# Patient Record
Sex: FEMALE | Race: WHITE | HISPANIC OR LATINO | Employment: OTHER | URBAN - METROPOLITAN AREA
[De-identification: names, ages, dates, MRNs, and addresses within clinical notes are randomized per-mention and may not be internally consistent; named-entity substitution may affect disease eponyms.]

---

## 2017-01-25 ENCOUNTER — TRANSCRIBE ORDERS (OUTPATIENT)
Dept: LAB | Facility: CLINIC | Age: 72
End: 2017-01-25

## 2017-01-25 ENCOUNTER — APPOINTMENT (OUTPATIENT)
Dept: LAB | Facility: CLINIC | Age: 72
End: 2017-01-25
Payer: COMMERCIAL

## 2017-01-25 DIAGNOSIS — I10 ESSENTIAL (PRIMARY) HYPERTENSION: ICD-10-CM

## 2017-01-25 DIAGNOSIS — F32.9 MAJOR DEPRESSIVE DISORDER, SINGLE EPISODE: ICD-10-CM

## 2017-01-25 LAB
ALBUMIN SERPL BCP-MCNC: 3.5 G/DL (ref 3.5–5)
ALP SERPL-CCNC: 72 U/L (ref 46–116)
ALT SERPL W P-5'-P-CCNC: 18 U/L (ref 12–78)
ANION GAP SERPL CALCULATED.3IONS-SCNC: 5 MMOL/L (ref 4–13)
AST SERPL W P-5'-P-CCNC: 24 U/L (ref 5–45)
BILIRUB SERPL-MCNC: 0.34 MG/DL (ref 0.2–1)
BUN SERPL-MCNC: 11 MG/DL (ref 5–25)
CALCIUM SERPL-MCNC: 8.6 MG/DL (ref 8.3–10.1)
CHLORIDE SERPL-SCNC: 106 MMOL/L (ref 100–108)
CHOLEST SERPL-MCNC: 148 MG/DL (ref 50–200)
CO2 SERPL-SCNC: 30 MMOL/L (ref 21–32)
CREAT SERPL-MCNC: 0.69 MG/DL (ref 0.6–1.3)
ERYTHROCYTE [DISTWIDTH] IN BLOOD BY AUTOMATED COUNT: 12.9 % (ref 11.6–15.1)
GFR SERPL CREATININE-BSD FRML MDRD: >60 ML/MIN/1.73SQ M
GLUCOSE SERPL-MCNC: 100 MG/DL (ref 65–140)
HCT VFR BLD AUTO: 41.5 % (ref 34.8–46.1)
HDLC SERPL-MCNC: 46 MG/DL (ref 40–60)
HGB BLD-MCNC: 13.6 G/DL (ref 11.5–15.4)
LDLC SERPL CALC-MCNC: 85 MG/DL (ref 0–100)
MCH RBC QN AUTO: 32.4 PG (ref 26.8–34.3)
MCHC RBC AUTO-ENTMCNC: 32.8 G/DL (ref 31.4–37.4)
MCV RBC AUTO: 99 FL (ref 82–98)
PLATELET # BLD AUTO: 209 THOUSANDS/UL (ref 149–390)
PMV BLD AUTO: 11.3 FL (ref 8.9–12.7)
POTASSIUM SERPL-SCNC: 4.3 MMOL/L (ref 3.5–5.3)
PROT SERPL-MCNC: 7.3 G/DL (ref 6.4–8.2)
RBC # BLD AUTO: 4.2 MILLION/UL (ref 3.81–5.12)
SODIUM SERPL-SCNC: 141 MMOL/L (ref 136–145)
TRIGL SERPL-MCNC: 87 MG/DL
TSH SERPL DL<=0.05 MIU/L-ACNC: 1.66 UIU/ML (ref 0.36–3.74)
WBC # BLD AUTO: 5.62 THOUSAND/UL (ref 4.31–10.16)

## 2017-01-25 PROCEDURE — 84443 ASSAY THYROID STIM HORMONE: CPT

## 2017-01-25 PROCEDURE — 80061 LIPID PANEL: CPT

## 2017-01-25 PROCEDURE — 80053 COMPREHEN METABOLIC PANEL: CPT

## 2017-01-25 PROCEDURE — 36415 COLL VENOUS BLD VENIPUNCTURE: CPT

## 2017-01-25 PROCEDURE — 85027 COMPLETE CBC AUTOMATED: CPT

## 2017-05-24 ENCOUNTER — ALLSCRIPTS OFFICE VISIT (OUTPATIENT)
Dept: OTHER | Facility: OTHER | Age: 72
End: 2017-05-24

## 2017-07-24 DIAGNOSIS — I10 ESSENTIAL (PRIMARY) HYPERTENSION: ICD-10-CM

## 2017-08-18 ENCOUNTER — GENERIC CONVERSION - ENCOUNTER (OUTPATIENT)
Dept: OTHER | Facility: OTHER | Age: 72
End: 2017-08-18

## 2017-11-17 ENCOUNTER — ALLSCRIPTS OFFICE VISIT (OUTPATIENT)
Dept: OTHER | Facility: OTHER | Age: 72
End: 2017-11-17

## 2017-11-17 DIAGNOSIS — I10 ESSENTIAL (PRIMARY) HYPERTENSION: ICD-10-CM

## 2017-11-19 NOTE — PROGRESS NOTES
Assessment    1  Benign essential hypertension (401 1) (I10)   2  Chronic obstructive pulmonary disease (496) (J44 9)   3  Osteoporosis (733 00) (M81 0)   4  Cigarette nicotine dependence without complication (200 3) (T97 054)   5  Urinary incontinence (788 30) (R32)    Plan  Benign essential hypertension    · (1) CBC/PLT/DIFF; Status:Active; Requested for:17May2018;    · (1) CBC/PLT/DIFF; Status:Active; Requested for:17Nov2017;    · (1) COMPREHENSIVE METABOLIC PANEL; Status:Active; Requested for:17May2018;    · (1) COMPREHENSIVE METABOLIC PANEL; Status:Active; Requested for:17Nov2017;    · (1) LIPID PANEL, FASTING; Status:Active; Requested for:17May2018;    · (1) LIPID PANEL, FASTING; Status:Active; Requested for:17Nov2017;    · (1) TSH; Status:Active; Requested for:17May2018;    · (1) TSH; Status:Active; Requested for:17Nov2017;   Flu vaccine need    · Fluzone High-Dose 0 5 ML Intramuscular Suspension Prefilled Syringe  Need for pneumococcal vaccination    · Prevnar 13 Intramuscular Suspension  Screening for genitourinary condition    · The plan of care for urinary incontinence is detailed in the plan and/or discussion section of today'snote ; Status:Complete;   Done: 76SHH5638    Discussion/Summary  Discussion Summary:   COPD- she takes Symbicort inhaler daily   She continues to take the albuterol inhaler as needed  feels better, is off medicationblood pressure stable on present medication, continue the same  incontinenceâ stable, not on any medication  Was told to do daily exercises and bladder training  continue alendronate, calcium and vitamin D useâ I counseled her to quit smoking  Counseling Documentation With Imm: The patient was counseled regarding instructions for management,-- risk factor reductions,-- risks and benefits of treatment options,-- importance of compliance with treatment     Medication SE Review and Pt Understands Tx: Possible side effects of new medications were reviewed with the patient/guardian today  The treatment plan was reviewed with the patient/guardian  The patient/guardian understands and agrees with the treatment plan      Chief Complaint  Chief Complaint Chronic Condition [facility]: Patient is here today for follow up of chronic conditions described in HPI  History of Present Illness  Incontinence, Urinary (Follow-Up): The patient is being seen for follow-up of urinary incontinence  The patient reports no change in the condition  She has had no significant interval events  The patient is currently asymptomatic  The patient is not currently on medication for this problem  COPD (Follow-Up): The patient states she has been stable with her COPD control since the last visit  She has no comorbid illnesses  She has no significant interval events  Symptoms: The patient is currently asymptomatic  Medications: the patient is not adherent with her medication regimen  -- She denies medication side effects  Osteoporosis (Follow-Up): The patient's osteoporosis has been stable since the last visit  She has no complications  She has no significant interval events  Symptoms:  Medications: the patient is adherent with her medication regimen  -- She denies medication side effects  Hypertension (Follow-Up): The patient presents for follow-up of essential hypertension  The patient states she has been stable with her blood pressure control since the last visit  She has no comorbid illnesses  She has no significant interval events  Symptoms: The patient is currently asymptomatic  Medications: the patient is adherent with her medication regimen  -- She denies medication side effects  Review of Systems  Complete-Female:  Constitutional: No fever, no chills, feels well, no tiredness, no recent weight gain or weight loss  Eyes: No complaints of eye pain, no red eyes, no eyesight problems, no discharge, no dry eyes, no itching of eyes    ENT: no complaints of earache, no loss of hearing, no nose bleeds, no nasal discharge, no sore throat, no hoarseness  Cardiovascular: No complaints of slow heart rate, no fast heart rate, no chest pain, no palpitations, no leg claudication, no lower extremity edema  Respiratory: No complaints of shortness of breath, no wheezing, no cough, no SOB on exertion, no orthopnea, no PND  Gastrointestinal: No complaints of abdominal pain, no constipation, no nausea or vomiting, no diarrhea, no bloody stools  Genitourinary: No complaints of dysuria, no incontinence, no pelvic pain, no dysmenorrhea, no vaginal discharge or bleeding  Musculoskeletal: No complaints of arthralgias, no myalgias, no joint swelling or stiffness, no limb pain or swelling  Integumentary: No complaints of skin rash or lesions, no itching, no skin wounds, no breast pain or lump  Neurological: No complaints of headache, no confusion, no convulsions, no numbness, no dizziness or fainting, no tingling, no limb weakness, no difficulty walking  Psychiatric: Not suicidal, no sleep disturbance, no anxiety or depression, no change in personality, no emotional problems  Endocrine: No complaints of proptosis, no hot flashes, no muscle weakness, no deepening of the voice, no feelings of weakness  Hematologic/Lymphatic: No complaints of swollen glands, no swollen glands in the neck, does not bleed easily, does not bruise easily  Active Problems  1  Abnormal mammogram (793 80) (R92 8)   2  Benign essential hypertension (401 1) (I10)   3  Chronic obstructive pulmonary disease (496) (J44 9)   4  Cigarette nicotine dependence without complication (649 9) (T02 198)   5  Depression (311) (F32 9)   6  Emphysema lung (492 8) (J43 9)   7  Flu vaccine need (V04 81) (Z23)   8  Osteoporosis (733 00) (M81 0)   9  Screening for diabetes mellitus (DM) (V77 1) (Z13 1)   10  Screening for genitourinary condition (V81 6) (Z13 89)   11  Tobacco use (305 1) (Z72 0)   12   Urinary incontinence (788 30) (R32)    Past Medical History  1  History of Ankle Sprain (845 00)   2  History of Ankle Strain (845 00)   3  History of Cough (786 2) (R05)   4  History of Encounter for screening for malignant neoplasm of colon (V76 51) (Z12 11)   5  History of Encounter for screening mammogram for breast cancer (V76 12) (Z12 31)   6  History of Fibromyalgia (729 1) (M79 7)   7  History of acute bronchitis (V12 69) (Z87 09)   8  History of chronic bronchitis (V12 69) (Z87 09)   9  History of hypertension (V12 59) (Z86 79)   10  History of shortness of breath (V13 89) (Z87 898)   11  History of Joint pain (719 40) (M25 50)   12  History of Myalgia And Myositis (729 1)   13  History of Need for influenza vaccination (V04 81) (Z23)   14  History of Osteoporosis screening (V82 81) (Z13 820)   15  History of Pain, joint, shoulder (719 41) (M25 519)   16  History of Sciatica of right side (724 3) (M54 31)   17  History of Screening for lipoid disorders (V77 91) (Z13 220)   18  History of Screening for thyroid disorder (V77 0) (Z13 29)  Active Problems And Past Medical History Reviewed: The active problems and past medical history were reviewed and updated today  Surgical History  1  History of Complete Colonoscopy  Surgical History Reviewed: The surgical history was reviewed and updated today  Family History  Mother    1  Family history of Denial Of Any Significant Medical History  Father    2  Family history of Denial Of Any Significant Medical History  Family History Reviewed: The family history was reviewed and updated today  Social History     · Alcohol Use (History)   · Cigar smoker motivated to quit (305 1) (F17 290)   · Denied: History of Drug Use   · Marital History - Currently    · Occupation:   · Tobacco use (305 1) (Z72 0)  Social History Reviewed: The social history was reviewed and updated today  The social history was reviewed and is unchanged  Current Meds   1   Alendronate Sodium 70 MG Oral Tablet; TAKE 1 TABLET BY MOUTH ONCE WEEKLY; Therapy: 85PRH9694 to (Last Rx:17Oct2017)  Requested for: 62Jwg2843 Ordered   2  Amlodipine Besy-Benazepril HCl - 5-20 MG Oral Capsule; take 1 capsule daily; Therapy: 81ONS9958 to (Renew:60Dlb6482)  Requested for: 55ZAH7923; Last Rx:93Hvi0497 Ordered   3  Proventil  (90 Base) MCG/ACT Inhalation Aerosol Solution; INHAL 2 QD PRN; Therapy: (BAFOELPY:57TLK2307) to Recorded   4  Symbicort 160-4 5 MCG/ACT Inhalation Aerosol; INHALE 2 PUFFS TWICE DAILY  RINSE MOUTH AFTER USE; Therapy: 33Uog3334 to (Last YB:66KBU9415)  Requested for: 02KCP2404 Ordered  Medication List Reviewed: The medication list was reviewed and updated today  Allergies  1  No Known Drug Allergies    Vitals  Vital Signs    Recorded: 17Nov2017 01:08PM   Heart Rate 98   Systolic 472   Diastolic 72   Height 4 ft 11 in   Weight 130 lb 6 oz   BMI Calculated 26 33   BSA Calculated 1 54   O2 Saturation 96       Physical Exam   Constitutional  General appearance: No acute distress, well appearing and well nourished  Head and Face  Head and face: Normal    Palpation of the face and sinuses: No sinus tenderness  Eyes  Conjunctiva and lids: No swelling, erythema or discharge  Pupils and irises: Equal, round, reactive to light  Ears, Nose, Mouth, and Throat  External inspection of ears and nose: Normal    Otoscopic examination: Tympanic membranes translucent with normal light reflex  Canals patent without erythema  Oropharynx: Normal with no erythema, edema, exudate or lesions  Neck  Neck: Supple, symmetric, trachea midline, no masses  Pulmonary  Respiratory effort: No increased work of breathing or signs of respiratory distress  Auscultation of lungs: Abnormal   Auscultation of the lungs revealed expiratory wheezing  Cardiovascular  Palpation of heart: Normal PMI, no thrills  Auscultation of heart: Normal rate and rhythm, normal S1 and S2, no murmurs     Examination of extremities for edema and/or varicosities: Normal    Abdomen  Abdomen: Non-tender, no masses  Lymphatic  Palpation of lymph nodes in neck: No lymphadenopathy  Musculoskeletal  Gait and station: Normal    Skin  Skin and subcutaneous tissue: Normal without rashes or lesions  Neurologic  Cranial nerves: Cranial nerves II-XII intact  Psychiatric  Judgment and insight: Normal    Mood and affect: Normal        Health Management  Health Maintenance   COLONOSCOPY; every 3 years; Last 24GNH8987; Next Due: 32BUF3375; Overdue  Influenza (Split); every 1 year; Last 56XVY1487; Next Due: 79ICN8576; Overdue  Medicare Annual Wellness Visit; every 1 year; Next Due: 12Xgy2843; Active  Pneumo; every 5 years; Last 27RFG8906; Next Due: [id number]; Overdue    Future Appointments    Date/Time Provider Specialty Site   05/25/2018 10:45 AM CORRINA Maxwell   Internal Medicine [facility]       Signatures   Electronically signed by : CORRINA Bowen ; Nov 17 2017  6:21PM EST                       (Author)

## 2018-01-13 VITALS
HEIGHT: 59 IN | HEART RATE: 98 BPM | SYSTOLIC BLOOD PRESSURE: 130 MMHG | OXYGEN SATURATION: 96 % | DIASTOLIC BLOOD PRESSURE: 72 MMHG | WEIGHT: 130.38 LBS | BODY MASS INDEX: 26.28 KG/M2

## 2018-01-14 VITALS
SYSTOLIC BLOOD PRESSURE: 142 MMHG | OXYGEN SATURATION: 97 % | DIASTOLIC BLOOD PRESSURE: 70 MMHG | WEIGHT: 128.5 LBS | HEIGHT: 59 IN | HEART RATE: 88 BPM | BODY MASS INDEX: 25.91 KG/M2

## 2018-05-17 DIAGNOSIS — I10 ESSENTIAL (PRIMARY) HYPERTENSION: ICD-10-CM

## 2018-05-18 ENCOUNTER — APPOINTMENT (OUTPATIENT)
Dept: LAB | Facility: CLINIC | Age: 73
End: 2018-05-18
Payer: COMMERCIAL

## 2018-05-18 DIAGNOSIS — I10 ESSENTIAL (PRIMARY) HYPERTENSION: ICD-10-CM

## 2018-05-18 LAB
ALBUMIN SERPL BCP-MCNC: 3.6 G/DL (ref 3.5–5)
ALP SERPL-CCNC: 91 U/L (ref 46–116)
ALT SERPL W P-5'-P-CCNC: 18 U/L (ref 12–78)
ANION GAP SERPL CALCULATED.3IONS-SCNC: 5 MMOL/L (ref 4–13)
AST SERPL W P-5'-P-CCNC: 26 U/L (ref 5–45)
BASOPHILS # BLD AUTO: 0.04 THOUSANDS/ΜL (ref 0–0.1)
BASOPHILS NFR BLD AUTO: 1 % (ref 0–1)
BILIRUB SERPL-MCNC: 0.5 MG/DL (ref 0.2–1)
BUN SERPL-MCNC: 6 MG/DL (ref 5–25)
CALCIUM SERPL-MCNC: 8.6 MG/DL (ref 8.3–10.1)
CHLORIDE SERPL-SCNC: 107 MMOL/L (ref 100–108)
CHOLEST SERPL-MCNC: 141 MG/DL (ref 50–200)
CO2 SERPL-SCNC: 27 MMOL/L (ref 21–32)
CREAT SERPL-MCNC: 0.74 MG/DL (ref 0.6–1.3)
EOSINOPHIL # BLD AUTO: 0.06 THOUSAND/ΜL (ref 0–0.61)
EOSINOPHIL NFR BLD AUTO: 1 % (ref 0–6)
ERYTHROCYTE [DISTWIDTH] IN BLOOD BY AUTOMATED COUNT: 13.1 % (ref 11.6–15.1)
GFR SERPL CREATININE-BSD FRML MDRD: 81 ML/MIN/1.73SQ M
GLUCOSE P FAST SERPL-MCNC: 100 MG/DL (ref 65–99)
HCT VFR BLD AUTO: 44.3 % (ref 34.8–46.1)
HDLC SERPL-MCNC: 42 MG/DL (ref 40–60)
HGB BLD-MCNC: 13.8 G/DL (ref 11.5–15.4)
LDLC SERPL CALC-MCNC: 85 MG/DL (ref 0–100)
LYMPHOCYTES # BLD AUTO: 1.54 THOUSANDS/ΜL (ref 0.6–4.47)
LYMPHOCYTES NFR BLD AUTO: 27 % (ref 14–44)
MCH RBC QN AUTO: 31.9 PG (ref 26.8–34.3)
MCHC RBC AUTO-ENTMCNC: 31.2 G/DL (ref 31.4–37.4)
MCV RBC AUTO: 103 FL (ref 82–98)
MONOCYTES # BLD AUTO: 0.28 THOUSAND/ΜL (ref 0.17–1.22)
MONOCYTES NFR BLD AUTO: 5 % (ref 4–12)
NEUTROPHILS # BLD AUTO: 3.82 THOUSANDS/ΜL (ref 1.85–7.62)
NEUTS SEG NFR BLD AUTO: 67 % (ref 43–75)
NONHDLC SERPL-MCNC: 99 MG/DL
NRBC BLD AUTO-RTO: 0 /100 WBCS
PLATELET # BLD AUTO: 201 THOUSANDS/UL (ref 149–390)
PMV BLD AUTO: 11.6 FL (ref 8.9–12.7)
POTASSIUM SERPL-SCNC: 4 MMOL/L (ref 3.5–5.3)
PROT SERPL-MCNC: 7.5 G/DL (ref 6.4–8.2)
RBC # BLD AUTO: 4.32 MILLION/UL (ref 3.81–5.12)
SODIUM SERPL-SCNC: 139 MMOL/L (ref 136–145)
TRIGL SERPL-MCNC: 69 MG/DL
TSH SERPL DL<=0.05 MIU/L-ACNC: 1.58 UIU/ML (ref 0.36–3.74)
WBC # BLD AUTO: 5.74 THOUSAND/UL (ref 4.31–10.16)

## 2018-05-18 PROCEDURE — 85025 COMPLETE CBC W/AUTO DIFF WBC: CPT

## 2018-05-18 PROCEDURE — 36415 COLL VENOUS BLD VENIPUNCTURE: CPT

## 2018-05-18 PROCEDURE — 84443 ASSAY THYROID STIM HORMONE: CPT

## 2018-05-18 PROCEDURE — 80053 COMPREHEN METABOLIC PANEL: CPT

## 2018-05-18 PROCEDURE — 80061 LIPID PANEL: CPT

## 2018-05-23 RX ORDER — ALBUTEROL SULFATE 90 UG/1
AEROSOL, METERED RESPIRATORY (INHALATION) EVERY 4 HOURS PRN
COMMUNITY
End: 2019-12-11 | Stop reason: SDUPTHER

## 2018-05-23 RX ORDER — ALENDRONATE SODIUM 70 MG/1
1 TABLET ORAL WEEKLY
COMMUNITY
Start: 2016-01-06 | End: 2018-11-18 | Stop reason: SDUPTHER

## 2018-05-23 RX ORDER — BUDESONIDE AND FORMOTEROL FUMARATE DIHYDRATE 160; 4.5 UG/1; UG/1
2 AEROSOL RESPIRATORY (INHALATION) 2 TIMES DAILY
COMMUNITY
Start: 2016-12-20 | End: 2019-01-29 | Stop reason: SDUPTHER

## 2018-05-23 RX ORDER — AMLODIPINE BESYLATE AND BENAZEPRIL HYDROCHLORIDE 5; 20 MG/1; MG/1
1 CAPSULE ORAL DAILY
COMMUNITY
Start: 2014-06-19 | End: 2018-06-09 | Stop reason: SDUPTHER

## 2018-05-25 ENCOUNTER — OFFICE VISIT (OUTPATIENT)
Dept: INTERNAL MEDICINE CLINIC | Facility: CLINIC | Age: 73
End: 2018-05-25
Payer: COMMERCIAL

## 2018-05-25 VITALS
BODY MASS INDEX: 26.21 KG/M2 | HEIGHT: 59 IN | DIASTOLIC BLOOD PRESSURE: 72 MMHG | WEIGHT: 130 LBS | HEART RATE: 99 BPM | OXYGEN SATURATION: 96 % | SYSTOLIC BLOOD PRESSURE: 136 MMHG

## 2018-05-25 DIAGNOSIS — F17.210 CIGARETTE NICOTINE DEPENDENCE WITHOUT COMPLICATION: ICD-10-CM

## 2018-05-25 DIAGNOSIS — N39.3 STRESS INCONTINENCE OF URINE: ICD-10-CM

## 2018-05-25 DIAGNOSIS — Z00.00 MEDICARE ANNUAL WELLNESS VISIT, SUBSEQUENT: Primary | ICD-10-CM

## 2018-05-25 DIAGNOSIS — M81.0 AGE-RELATED OSTEOPOROSIS WITHOUT CURRENT PATHOLOGICAL FRACTURE: ICD-10-CM

## 2018-05-25 DIAGNOSIS — I10 BENIGN ESSENTIAL HYPERTENSION: ICD-10-CM

## 2018-05-25 DIAGNOSIS — J44.9 CHRONIC OBSTRUCTIVE PULMONARY DISEASE, UNSPECIFIED COPD TYPE (HCC): ICD-10-CM

## 2018-05-25 PROCEDURE — 3075F SYST BP GE 130 - 139MM HG: CPT | Performed by: INTERNAL MEDICINE

## 2018-05-25 PROCEDURE — 3008F BODY MASS INDEX DOCD: CPT | Performed by: INTERNAL MEDICINE

## 2018-05-25 PROCEDURE — 3078F DIAST BP <80 MM HG: CPT | Performed by: INTERNAL MEDICINE

## 2018-05-25 PROCEDURE — G0439 PPPS, SUBSEQ VISIT: HCPCS | Performed by: INTERNAL MEDICINE

## 2018-05-25 PROCEDURE — 99214 OFFICE O/P EST MOD 30 MIN: CPT | Performed by: INTERNAL MEDICINE

## 2018-05-25 PROCEDURE — 4004F PT TOBACCO SCREEN RCVD TLK: CPT | Performed by: INTERNAL MEDICINE

## 2018-05-25 PROCEDURE — 3725F SCREEN DEPRESSION PERFORMED: CPT | Performed by: INTERNAL MEDICINE

## 2018-05-25 PROCEDURE — 1101F PT FALLS ASSESS-DOCD LE1/YR: CPT | Performed by: INTERNAL MEDICINE

## 2018-05-25 PROCEDURE — 1160F RVW MEDS BY RX/DR IN RCRD: CPT | Performed by: INTERNAL MEDICINE

## 2018-05-25 NOTE — PATIENT INSTRUCTIONS
Wellness Visit for Adults   WHAT YOU NEED TO KNOW:   What is a wellness visit? A wellness visit is when you see your healthcare provider to get screened for health problems  You can also get advice on how to stay healthy  Write down your questions so you remember to ask them  Ask your healthcare provider how often you should have a wellness visit  What happens at a wellness visit? Your healthcare provider will ask about your health, and your family history of health problems  This includes high blood pressure, heart disease, and cancer  He or she will ask if you have symptoms that concern you, if you smoke, and about your mood  You may also be asked about your intake of medicines, supplements, food, and alcohol  Any of the following may be done:  · Your weight  will be checked  Your height may also be checked so your body mass index (BMI) can be calculated  Your BMI shows if you are at a healthy weight  · Your blood pressure  and heart rate will be checked  Your temperature may also be checked  · Blood and urine tests  may be done  Blood tests may be done to check your cholesterol levels  Abnormal cholesterol levels increase your risk for heart disease and stroke  You may also need a blood or urine test to check for diabetes if you are at increased risk  Urine tests may be done to look for signs of an infection or kidney disease  · A physical exam  includes checking your heartbeat and lungs with a stethoscope  Your healthcare provider may also check your skin to look for sun damage  · Screening tests  may be recommended  A screening test is done to check for diseases that may not cause symptoms  The screening tests you may need depend on your age, gender, family history, and lifestyle habits  For example, colorectal screening may be recommended if you are 48years old or older  What screening tests do I need if I am a woman? · A Pap smear  is used to screen for cervical cancer   Pap smears are usually done every 3 to 5 years depending on your age  You may need them more often if you have had abnormal Pap smear test results in the past  Ask your healthcare provider how often you should have a Pap smear  · A mammogram  is an x-ray of your breasts to screen for breast cancer  Experts recommend mammograms every 2 years starting at age 48 years  You may need a mammogram at age 52 years or younger if you have an increased risk for breast cancer  Talk to your healthcare provider about when you should start having mammograms and how often you need them  What vaccines might I need? · Get an influenza vaccine  every year  The influenza vaccine protects you from the flu  Several types of viruses cause the flu  The viruses change over time, so new vaccines are made each year  · Get a tetanus-diphtheria (Td) booster vaccine  every 10 years  This vaccine protects you against tetanus and diphtheria  Tetanus is a severe infection that may cause painful muscle spasms and lockjaw  Diphtheria is a severe bacterial infection that causes a thick covering in the back of your mouth and throat  · Get a human papillomavirus (HPV) vaccine  if you are female and aged 23 to 32 or male 23 to 24 and never received it  This vaccine protects you from HPV infection  HPV is the most common infection spread by sexual contact  HPV may also cause vaginal, penile, and anal cancers  · Get a pneumococcal vaccine  if you are aged 72 years or older  The pneumococcal vaccine is an injection given to protect you from pneumococcal disease  Pneumococcal disease is an infection caused by pneumococcal bacteria  The infection may cause pneumonia, meningitis, or an ear infection  · Get a shingles vaccine  if you are aged 61 or older, even if you have had shingles before  The shingles vaccine is an injection to protect you from the varicella-zoster virus  This is the same virus that causes chickenpox   Shingles is a painful rash that develops in people who had chickenpox or have been exposed to the virus  How can I eat healthy? My Plate is a model for planning healthy meals  It shows the types and amounts of foods that should go on your plate  Fruits and vegetables make up about half of your plate, and grains and protein make up the other half  A serving of dairy is included on the side of your plate  The amount of calories and serving sizes you need depends on your age, gender, weight, and height  Examples of healthy foods are listed below:  · Eat a variety of vegetables  such as dark green, red, and orange vegetables  You can also include canned vegetables low in sodium (salt) and frozen vegetables without added butter or sauces  · Eat a variety of fresh fruits , canned fruit in 100% juice, frozen fruit, and dried fruit  · Include whole grains  At least half of the grains you eat should be whole grains  Examples include whole-wheat bread, wheat pasta, brown rice, and whole-grain cereals such as oatmeal     · Eat a variety of protein foods such as seafood (fish and shellfish), lean meat, and poultry without skin (turkey and chicken)  Examples of lean meats include pork leg, shoulder, or tenderloin, and beef round, sirloin, tenderloin, and extra lean ground beef  Other protein foods include eggs and egg substitutes, beans, peas, soy products, nuts, and seeds  · Choose low-fat dairy products such as skim or 1% milk or low-fat yogurt, cheese, and cottage cheese  · Limit unhealthy fats  such as butter, hard margarine, and shortening  How much exercise do I need? Exercise at least 30 minutes per day on most days of the week  Some examples of exercise include walking, biking, dancing, and swimming  You can also fit in more physical activity by taking the stairs instead of the elevator or parking farther away from stores  Include muscle strengthening activities 2 days each week  Regular exercise provides many health benefits  It helps you manage your weight, and decreases your risk for type 2 diabetes, heart disease, stroke, and high blood pressure  Exercise can also help improve your mood  Ask your healthcare provider about the best exercise plan for you  What are some general health and safety guidelines I should follow? · Do not smoke  Nicotine and other chemicals in cigarettes and cigars can cause lung damage  Ask your healthcare provider for information if you currently smoke and need help to quit  E-cigarettes or smokeless tobacco still contain nicotine  Talk to your healthcare provider before you use these products  · Limit alcohol  A drink of alcohol is 12 ounces of beer, 5 ounces of wine, or 1½ ounces of liquor  · Lose weight, if needed  Being overweight increases your risk of certain health conditions  These include heart disease, high blood pressure, type 2 diabetes, and certain types of cancer  · Protect your skin  Do not sunbathe or use tanning beds  Use sunscreen with a SPF 15 or higher  Apply sunscreen at least 15 minutes before you go outside  Reapply sunscreen every 2 hours  Wear protective clothing, hats, and sunglasses when you are outside  · Drive safely  Always wear your seatbelt  Make sure everyone in your car wears a seatbelt  A seatbelt can save your life if you are in an accident  Do not use your cell phone when you are driving  This could distract you and cause an accident  Pull over if you need to make a call or send a text message  · Practice safe sex  Use latex condoms if are sexually active and have more than one partner  Your healthcare provider may recommend screening tests for sexually transmitted infections (STIs)  · Wear helmets, lifejackets, and protective gear  Always wear a helmet when you ride a bike or motorcycle, go skiing, or play sports that could cause a head injury  Wear protective equipment when you play sports   Wear a lifejacket when you are on a boat or doing water sports  CARE AGREEMENT:   You have the right to help plan your care  Learn about your health condition and how it may be treated  Discuss treatment options with your caregivers to decide what care you want to receive  You always have the right to refuse treatment  The above information is an  only  It is not intended as medical advice for individual conditions or treatments  Talk to your doctor, nurse or pharmacist before following any medical regimen to see if it is safe and effective for you  © 2017 [employer] Information is for End User's use only and may not be sold, redistributed or otherwise used for commercial purposes  All illustrations and images included in CareNotes® are the copyrighted property of A [employer]  or [employer] Morales

## 2018-05-25 NOTE — PROGRESS NOTES
HPI:  Taz Carrasco is a 68 y o  female here for her Subsequent Wellness Visit  Patient Active Problem List   Diagnosis    Benign essential hypertension    Chronic obstructive pulmonary disease ([facility] )    Cigarette nicotine dependence without complication    Osteoporosis    Urinary incontinence     Past Medical History:   Diagnosis Date    Abnormal mammogram 1/7/2016    Chronic bronchitis ([facility] )     Last Assessed:  3/23/16    Fibromyalgia     Hypertension     Recurrent major depressive disorder, in partial remission ([facility] [address] ) 12/20/2016     Past Surgical History:   Procedure Laterality Date    COLONOSCOPY       History reviewed  No pertinent family history  History   Smoking Status    Current Every Day Smoker    Packs/day: 1 00    Years: 50 00    Types: Cigarettes   Smokeless Tobacco    Never Used     Comment: Motivated to quit     History   Alcohol Use    Yes      History   Drug Use No     /72   Pulse 99   Ht 4' 10 5" (1 486 m)   Wt 59 kg (130 lb)   SpO2 96%   BMI 26 71 kg/m²       Current Outpatient Prescriptions   Medication Sig Dispense Refill    albuterol (PROVENTIL HFA) 90 mcg/act inhaler Inhale      alendronate (FOSAMAX) 70 mg tablet Take 1 tablet by mouth once a week      amLODIPine-benazepril (LOTREL 5-20) 5-20 MG per capsule Take 1 capsule by mouth daily      budesonide-formoterol (SYMBICORT) 160-4 5 mcg/act inhaler Inhale 2 puffs 2 (two) times a day       No current facility-administered medications for this visit        No Known Allergies  Immunization History   Administered Date(s) Administered    Influenza 11/18/2013    Influenza Split High Dose Preservative Free IM 11/23/2015, 12/20/2016, 11/17/2017    Influenza TIV (IM) 10/06/2010, 11/09/2011, 11/29/2012    Pneumococcal Conjugate 13-Valent 11/17/2017    Pneumococcal Polysaccharide PPV23 11/04/2006       Patient Care Team:  Fei Sorensen MD as PCP - General      Medicare Screening Tests and Risk Assessments:  AWV Clinical     ISAR:   Previous hospitalizations?:  No       Once in a Lifetime Medicare Screening:   EKG performed?:  No    AAA screening performed? (if performed, please add date to Health Maintenance):  No       Medicare Screening Tests and Risk Assessment:   AAA Risk Assessment    Osteoporosis Risk Assessment     Female:   Yes   Age over 48:  Yes    HIV Risk Assessment    None indicated:  Yes        Drug and Alcohol Use:   Tobacco use    Cigarettes:  current smoker    Tobacco use duration    Packs per day:  1    Tobacco Cessation Readiness    Alcohol use    Alcohol use:  rare use    Alcohol Treatment Readiness   Illicit Drug Use    Drug use:  never        Diet & Exercise:   Diet   What is your diet?:  Regular   How many servings a day of the following:   Fruits and Vegetables:  1-2 Meat:  1-2   Whole Grains:  1 Simple Carbs:  1   Dairy:  1 Soda:  0   Coffee:  4 Tea:  4   Exercise    Do you currently exercise?:  yes   Times per week:  3     Type of exercise:  walking       Cognitive Impairment Screening:   Cognitive Impairment Screening    Do you have difficulty learning or retaining new information?:  No Do you have difficulty handling new tasks?:  No   Do you have difficulty with reasoning?:  No Do you have difficulty with spatial ability and orientation?:  No   Do you have difficulty with language?:  No Do you have difficulty with behavior?:  No       Functional Ability/Level of Safety:   Hearing    Hearing difficulties:  No Bilateral:  normal   Left:  normal Right:  normal   Hearing aid:  No    Hearing Impairment Assessment    Hearing status:  No impairment   Current Activities    Status:  unlimited ADL's, limited social activities, limited driving   Help needed with the folllowing:    Using the phone:  No Transportation:  No   Shopping:  No Preparing Meals:  No   Doing Housework:  No Doing Laundry:  No   Managing Medications:  No Managing Money:  No   ADL    Fall Risk   Have you fallen in the last 12 months?: No    Injury History       Home Safety:   Home Safety Risk Factors       Advanced Directives:   Advanced Directives    Living Will:  No Durable POA for healthcare:  No   Advanced directive:  No    Patient's End of Life Decisions        Urinary Incontinence:   Do you have urinary incontinence?:  No        Glaucoma:            Provider Screening     Preventative Screening/Counseling:   Cardiovascular Screening/Counseling:   (Labs Q5 years, EKG optional one-time)   General:  Risks and Benefits Discussed           Diabetes Screening/Counseling:   (2 tests/year if Pre-Diabetes or 1 test/year if no Diabetes)   General:  Risks and Benefits Discussed           Colorectal Cancer Screening/Counseling:   (FOBT Q1 yr; Flex Sig Q4 yrs or Q10 yrs after Screening Colonoscopy; Screening Colonoscpy Q2 yrs High Risk or Q10 yrs Low Risk; Barium Enema Q2 yrs High Risk or Q4 yrs Low Risk)   General:  Screening Current           Prostate Cancer Screening/Counseling:   (Annual)          Breast Cancer Screening/Counseling:   (Baseline Age 28 - 43; Annual Age 36+)   General:  Screening Current          Cervical Cancer Screening/Counseling:   (Annual for High Risk or Childbearing Age with Abnormal Pap in Last 3 yrs; Every 2 all others)         Osteoporosis Screening/Counseling:   (Every 2 Yrs if at risk or more if medically necessary)   General:  Screening Current           AAA Screening/Counseling:   (Once per Lifetime with risk factors)          Glaucoma Screening/Counseling:   (Annual)         HIV Screening/Counseling:   (Voluntary; Once annually for high risk OR 3 times for Pregnancy at diagnosis of IUP; 3rd trimester; and at Labor         Hepatitis C Screening:             Immunizations:        Other Preventative Couseling (Non-Medicare Wellness Visit Required):       Referrals (Non-Medicare Wellness Visit Required):       Medical Equipment/Suppliers:           No exam data present  Reviewed Updated [facility] Prior Wellness Visits: Last Medicare wellness visit information was reviewed, patient interviewed , no change since last AWVyes  Last Medicare wellness visit information was reviewed, patient interviewed and updates made to the record today yes    Assessment and Plan:  1  Medicare annual wellness visit, subsequent     2  Benign essential hypertension  CBC and differential    Comprehensive metabolic panel    Lipid panel    TSH, 3rd generation   3  Chronic obstructive pulmonary disease, unspecified COPD type ([facility] [address] )     4  Age-related osteoporosis without current pathological fracture  DXA bone density spine hip and pelvis   5  Stress incontinence of urine     6   Cigarette nicotine dependence without complication         Health Maintenance Due   Topic Date Due    Hepatitis C Screening  1945    [facility] PLAN OF CARE  1945    DTaP,Tdap,and Td Vaccines (1 - Tdap) 02/02/1966    GLAUCOMA SCREENING 67+ YR  02/02/2012    COLONOSCOPY  01/14/2017

## 2018-05-25 NOTE — PROGRESS NOTES
INTERNAL MEDICINE OFFICE VISIT  [facility] [address] [place] [address]  Tel: [phone]      NAME: Earnestine Hatfield  AGE: 68 y o  SEX: female  : 1945   MRN: [id number]    DATE: 2018  TIME: 11:17 AM      Assessment and Plan:  1  Medicare annual wellness visit, subsequent      2  Benign essential hypertension  Blood pressure stable, continue same medication  - CBC and differential; Future  - Comprehensive metabolic panel; Future  - Lipid panel; Future  - TSH, 3rd generation; Future    3  Chronic obstructive pulmonary disease, unspecified COPD type ([address] )  She takes her inhalers as needed    4  Age-related osteoporosis without current pathological fracture  Continue Fosamax  Will repeat a DEXA scan as it is about 2 and years since the last DEXA scan  - DXA bone density spine hip and pelvis; Future    5  Stress incontinence of urine  She does not want take any medication    6  Cigarette nicotine dependence without complication  She was counseled to quit smoking      - Counseling Documentation: patient was counseled regarding: diagnostic results, instructions for management, risk factor reductions, prognosis, patient and family education, impressions, risks and benefits of treatment options and importance of compliance with treatment  - Medication Side Effects: Adverse side effects of medications were reviewed with the patient/guardian today  Return for follow up visit in 6 months or earlier, if needed  Chief Complaint:  Chief Complaint   Patient presents with    Medicare Wellness Visit         History of Present Illness:   Hypertension-blood pressure stable on amlodipine and benazepril, she remains asymptomatic  COPD-she takes her inhalers as needed  Was told to take Symbicort regularly  Osteoporosis-she had the last DEXA scan about 2-1/2 years ago when she was started on the Fosamax    Stress incontinence-does not want take any medication  Current smoker-she does not want to quit even though she was told multiple times  Active Problem List:  Patient Active Problem List   Diagnosis    Benign essential hypertension    Chronic obstructive pulmonary disease ([facility] )    Cigarette nicotine dependence without complication    Osteoporosis    Urinary incontinence         Past Medical History:  Past Medical History:   Diagnosis Date    Abnormal mammogram 1/7/2016    Chronic bronchitis ([facility] )     Last Assessed:  3/23/16    Fibromyalgia     Hypertension     Recurrent major depressive disorder, in partial remission ([facility] ) 12/20/2016         Past Surgical History:  Past Surgical History:   Procedure Laterality Date    COLONOSCOPY           Family History:  History reviewed  No pertinent family history        Social History:  Social History     Social History    Marital status: /Civil Union     Spouse name: N/A    Number of children: N/A    Years of education: N/A     Occupational History    Maintenance      Social History Main Topics    Smoking status: Current Every Day Smoker     Packs/day: 1 00     Years: 50 00     Types: Cigarettes    Smokeless tobacco: Never Used      Comment: Motivated to quit    Alcohol use Yes    Drug use: No    Sexual activity: No     Other Topics Concern    None     Social History Narrative    None         Allergies:  No Known Allergies      Medications:    Current Outpatient Prescriptions:     albuterol (PROVENTIL HFA) 90 mcg/act inhaler, Inhale, Disp: , Rfl:     alendronate (FOSAMAX) 70 mg tablet, Take 1 tablet by mouth once a week, Disp: , Rfl:     amLODIPine-benazepril (LOTREL 5-20) 5-20 MG per capsule, Take 1 capsule by mouth daily, Disp: , Rfl:     budesonide-formoterol (SYMBICORT) 160-4 5 mcg/act inhaler, Inhale 2 puffs 2 (two) times a day, Disp: , Rfl:       The following portions of the patient's history were reviewed and updated as appropriate: past medical history, past surgical history, family history, social history, allergies, current medications and active problem list       Review of Systems:  Constitutional: Denies fever, chills, weight gain, weight loss, fatigue  Eyes: Denies eye redness, eye discharge, double vision, change in visual acuity  ENT: Denies hearing loss, tinnitus, sneezing, nasal congestion, nasal discharge, sore throat   Respiratory: Denies cough, expectoration, hemoptysis, shortness of breath, wheezing  Cardiovascular: Denies chest pain, palpitations, lower extremity swelling, orthopnea, PND  Gastrointestinal: Denies abdominal pain, heartburn, nausea, vomiting, hematemesis, diarrhea, bloody stools  Genito-Urinary: Denies dysuria, frequency, difficulty in micturition, nocturia, incontinence  Musculoskeletal: Denies back pain, joint pain, muscle pain  Neurologic: Denies confusion, lightheadedness, syncope, headache, focal weakness, sensory changes, seizures  Endocrine: Denies polyuria, polydipsia, temperature intolerance  Allergy and Immunology: Denies hives, insect bite sensitivity  Hematological and Lymphatic: Denies bleeding problems, swollen glands   Psychological: Denies depression, suicidal ideation, anxiety, panic, mood swings  Dermatological: Denies pruritus, rash, skin lesion changes      Vitals:  Vitals:    05/25/18 1039   BP: 136/72   Pulse: 99   SpO2: 96%       Body mass index is 26 71 kg/m²  Weight (last 2 days)     Date/Time   Weight    05/25/18 1039  59 (130)                Physical Examination:  General: Patient is not in acute distress  Awake, alert, responding to commands  No weight gain or loss  Head: Normocephalic  Atraumatic  Eyes: Conjunctiva and lids with no swelling, erythema or discharge  Both pupils normal sized, round and reactive to light  Sclera nonicteric  ENT: External examination of nose and ear normal  Otoscopic examination shows translucent tympanic membranes with patent canals without erythema   Oropharynx moist with no erythema, edema, exudate or lesions  Neck: Supple  JVP not raised  Trachea midline  No masses  No thyromegaly  Lungs: No signs of increased work of breathing or respiratory distress  Bilateral bronchovascular breath sounds with no crackles or rhonchi  Chest wall: No tenderness  Cardiovascular: Normal PMI  No thrills  Regular rate and rhythm  S1 and S2 normal  No murmur, rub or gallop  Gastrointestinal: Abdomen soft, nontender  No guarding or rigidity  Liver and spleen not palpable  Bowel sounds present  Neurologic: Cranial nerves II-XII intact   Cortical functions normal  Motor system - Reflexes 2+ and symmetrical  Sensations normal  Musculoskeletal: Gait normal  No joint tenderness  Integumentary: Skin normal with no rash or lesions  Lymphatic: No palpable lymph nodes in neck, axilla or groin  Extremities: No clubbing, cyanosis, edema or varicosities  Psychological: Judgement and insight normal  Mood and affect normal      Laboratory Results:  CBC with diff:   Lab Results   Component Value Date    WBC 5 74 05/18/2018    WBC 6 50 11/23/2015    RBC 4 32 05/18/2018    RBC 4 37 11/23/2015    HGB 13 8 05/18/2018    HGB 14 0 11/23/2015    HCT 44 3 05/18/2018    HCT 42 9 11/23/2015     (H) 05/18/2018    MCV 98 11/23/2015    MCH 31 9 05/18/2018    MCH 32 0 11/23/2015    RDW 13 1 05/18/2018    RDW 12 9 11/23/2015     05/18/2018     11/23/2015       CMP:  Lab Results   Component Value Date    CREATININE 0 74 05/18/2018    CREATININE 0 85 11/23/2015    BUN 6 05/18/2018    BUN 9 11/23/2015     05/18/2018     11/23/2015    K 4 0 05/18/2018    K 3 9 11/23/2015     05/18/2018     11/23/2015    CO2 27 05/18/2018    CO2 29 11/23/2015    GLUCOSE 100 01/25/2017    GLUCOSE 93 11/23/2015    PROT 7 5 05/18/2018    PROT 7 1 11/23/2015    ALKPHOS 91 05/18/2018    ALKPHOS 110 11/23/2015    ALT 18 05/18/2018    ALT 15 11/23/2015    AST 26 05/18/2018    AST 24 11/23/2015       Lab Results   Component Value Date    HGBA1C 5 9 08/18/2014       No results found for: TROPONINI, CKMB, CKTOTAL    Lipid Profile:   Lab Results   Component Value Date    CHOL 141 05/18/2018    CHOL 148 01/25/2017     Lab Results   Component Value Date    HDL 42 05/18/2018    HDL 46 01/25/2017     Lab Results   Component Value Date    LDLCALC 85 05/18/2018    [facility] 85 01/25/2017     Lab Results   Component Value Date    TRIG 69 05/18/2018    TRIG 87 01/25/2017         Health Maintenance:  Health Maintenance   Topic Date Due    Hepatitis C Screening  1945    [facility] PLAN OF CARE  1945    DTaP,Tdap,and Td Vaccines (1 - Tdap) 02/02/1966    GLAUCOMA SCREENING 67+ YR  02/02/2012    COLONOSCOPY  01/14/2017    INFLUENZA VACCINE  09/01/2018    Fall Risk  05/25/2019    Urinary Incontinence Screening  05/25/2019    Annual Wellnes Visit (AWV)  05/25/2019    PNEUMOCOCCAL POLYSACCHARIDE VACCINE AGE 65 AND OVER  Completed     Immunization History   Administered Date(s) Administered    Influenza 11/18/2013    Influenza Split High Dose Preservative Free IM 11/23/2015, 12/20/2016, 11/17/2017    Influenza TIV (IM) 10/06/2010, 11/09/2011, 11/29/2012    Pneumococcal Conjugate 13-Valent 11/17/2017    Pneumococcal Polysaccharide PPV23 11/04/2006         Kev Rausch MD  5/25/2018,11:17 AM

## 2018-06-09 DIAGNOSIS — I10 ESSENTIAL HYPERTENSION: Primary | ICD-10-CM

## 2018-06-11 RX ORDER — AMLODIPINE BESYLATE AND BENAZEPRIL HYDROCHLORIDE 5; 20 MG/1; MG/1
CAPSULE ORAL
Qty: 90 CAPSULE | Refills: 1 | Status: SHIPPED | OUTPATIENT
Start: 2018-06-11 | End: 2019-04-26 | Stop reason: SDUPTHER

## 2018-11-18 DIAGNOSIS — M81.0 AGE-RELATED OSTEOPOROSIS WITHOUT CURRENT PATHOLOGICAL FRACTURE: Primary | ICD-10-CM

## 2018-11-19 RX ORDER — ALENDRONATE SODIUM 70 MG/1
TABLET ORAL
Qty: 12 TABLET | Refills: 3 | Status: SHIPPED | OUTPATIENT
Start: 2018-11-19 | End: 2019-10-25 | Stop reason: SDUPTHER

## 2018-11-30 ENCOUNTER — LAB (OUTPATIENT)
Dept: LAB | Facility: CLINIC | Age: 73
End: 2018-11-30
Payer: COMMERCIAL

## 2018-11-30 DIAGNOSIS — I10 BENIGN ESSENTIAL HYPERTENSION: ICD-10-CM

## 2018-11-30 LAB
ALBUMIN SERPL BCP-MCNC: 3.5 G/DL (ref 3.5–5)
ALP SERPL-CCNC: 82 U/L (ref 46–116)
ALT SERPL W P-5'-P-CCNC: 13 U/L (ref 12–78)
ANION GAP SERPL CALCULATED.3IONS-SCNC: 4 MMOL/L (ref 4–13)
AST SERPL W P-5'-P-CCNC: 20 U/L (ref 5–45)
BASOPHILS # BLD AUTO: 0.06 THOUSANDS/ΜL (ref 0–0.1)
BASOPHILS NFR BLD AUTO: 1 % (ref 0–1)
BILIRUB SERPL-MCNC: 0.33 MG/DL (ref 0.2–1)
BUN SERPL-MCNC: 10 MG/DL (ref 5–25)
CALCIUM SERPL-MCNC: 8.2 MG/DL (ref 8.3–10.1)
CHLORIDE SERPL-SCNC: 107 MMOL/L (ref 100–108)
CHOLEST SERPL-MCNC: 151 MG/DL (ref 50–200)
CO2 SERPL-SCNC: 29 MMOL/L (ref 21–32)
CREAT SERPL-MCNC: 0.6 MG/DL (ref 0.6–1.3)
EOSINOPHIL # BLD AUTO: 0.08 THOUSAND/ΜL (ref 0–0.61)
EOSINOPHIL NFR BLD AUTO: 1 % (ref 0–6)
ERYTHROCYTE [DISTWIDTH] IN BLOOD BY AUTOMATED COUNT: 12.5 % (ref 11.6–15.1)
GFR SERPL CREATININE-BSD FRML MDRD: 91 ML/MIN/1.73SQ M
GLUCOSE P FAST SERPL-MCNC: 84 MG/DL (ref 65–99)
HCT VFR BLD AUTO: 43.8 % (ref 34.8–46.1)
HDLC SERPL-MCNC: 39 MG/DL (ref 40–60)
HGB BLD-MCNC: 13.6 G/DL (ref 11.5–15.4)
IMM GRANULOCYTES # BLD AUTO: 0.01 THOUSAND/UL (ref 0–0.2)
IMM GRANULOCYTES NFR BLD AUTO: 0 % (ref 0–2)
LDLC SERPL CALC-MCNC: 90 MG/DL (ref 0–100)
LYMPHOCYTES # BLD AUTO: 2.07 THOUSANDS/ΜL (ref 0.6–4.47)
LYMPHOCYTES NFR BLD AUTO: 32 % (ref 14–44)
MCH RBC QN AUTO: 31.8 PG (ref 26.8–34.3)
MCHC RBC AUTO-ENTMCNC: 31.1 G/DL (ref 31.4–37.4)
MCV RBC AUTO: 102 FL (ref 82–98)
MONOCYTES # BLD AUTO: 0.39 THOUSAND/ΜL (ref 0.17–1.22)
MONOCYTES NFR BLD AUTO: 6 % (ref 4–12)
NEUTROPHILS # BLD AUTO: 3.77 THOUSANDS/ΜL (ref 1.85–7.62)
NEUTS SEG NFR BLD AUTO: 60 % (ref 43–75)
NONHDLC SERPL-MCNC: 112 MG/DL
NRBC BLD AUTO-RTO: 0 /100 WBCS
PLATELET # BLD AUTO: 191 THOUSANDS/UL (ref 149–390)
PMV BLD AUTO: 11.9 FL (ref 8.9–12.7)
POTASSIUM SERPL-SCNC: 4 MMOL/L (ref 3.5–5.3)
PROT SERPL-MCNC: 7.2 G/DL (ref 6.4–8.2)
RBC # BLD AUTO: 4.28 MILLION/UL (ref 3.81–5.12)
SODIUM SERPL-SCNC: 140 MMOL/L (ref 136–145)
TRIGL SERPL-MCNC: 109 MG/DL
TSH SERPL DL<=0.05 MIU/L-ACNC: 2.92 UIU/ML (ref 0.36–3.74)
WBC # BLD AUTO: 6.38 THOUSAND/UL (ref 4.31–10.16)

## 2018-11-30 PROCEDURE — 85025 COMPLETE CBC W/AUTO DIFF WBC: CPT

## 2018-11-30 PROCEDURE — 80053 COMPREHEN METABOLIC PANEL: CPT

## 2018-11-30 PROCEDURE — 36415 COLL VENOUS BLD VENIPUNCTURE: CPT

## 2018-11-30 PROCEDURE — 80061 LIPID PANEL: CPT

## 2018-11-30 PROCEDURE — 84443 ASSAY THYROID STIM HORMONE: CPT

## 2018-12-03 ENCOUNTER — OFFICE VISIT (OUTPATIENT)
Dept: INTERNAL MEDICINE CLINIC | Facility: CLINIC | Age: 73
End: 2018-12-03
Payer: COMMERCIAL

## 2018-12-03 VITALS
HEIGHT: 59 IN | BODY MASS INDEX: 25.52 KG/M2 | SYSTOLIC BLOOD PRESSURE: 122 MMHG | WEIGHT: 126.6 LBS | OXYGEN SATURATION: 95 % | DIASTOLIC BLOOD PRESSURE: 70 MMHG | HEART RATE: 80 BPM

## 2018-12-03 DIAGNOSIS — Z23 NEED FOR VACCINATION: ICD-10-CM

## 2018-12-03 DIAGNOSIS — F17.210 CIGARETTE NICOTINE DEPENDENCE WITHOUT COMPLICATION: ICD-10-CM

## 2018-12-03 DIAGNOSIS — M25.561 CHRONIC PAIN OF BOTH KNEES: ICD-10-CM

## 2018-12-03 DIAGNOSIS — I10 BENIGN ESSENTIAL HYPERTENSION: Primary | ICD-10-CM

## 2018-12-03 DIAGNOSIS — G89.29 CHRONIC PAIN OF BOTH KNEES: ICD-10-CM

## 2018-12-03 DIAGNOSIS — M81.0 AGE-RELATED OSTEOPOROSIS WITHOUT CURRENT PATHOLOGICAL FRACTURE: ICD-10-CM

## 2018-12-03 DIAGNOSIS — J44.9 CHRONIC OBSTRUCTIVE PULMONARY DISEASE, UNSPECIFIED COPD TYPE (HCC): ICD-10-CM

## 2018-12-03 DIAGNOSIS — M25.562 CHRONIC PAIN OF BOTH KNEES: ICD-10-CM

## 2018-12-03 PROCEDURE — 3008F BODY MASS INDEX DOCD: CPT | Performed by: INTERNAL MEDICINE

## 2018-12-03 PROCEDURE — 4040F PNEUMOC VAC/ADMIN/RCVD: CPT

## 2018-12-03 PROCEDURE — G0008 ADMIN INFLUENZA VIRUS VAC: HCPCS

## 2018-12-03 PROCEDURE — 3078F DIAST BP <80 MM HG: CPT | Performed by: INTERNAL MEDICINE

## 2018-12-03 PROCEDURE — 99214 OFFICE O/P EST MOD 30 MIN: CPT | Performed by: INTERNAL MEDICINE

## 2018-12-03 PROCEDURE — 90662 IIV NO PRSV INCREASED AG IM: CPT

## 2018-12-03 PROCEDURE — 1160F RVW MEDS BY RX/DR IN RCRD: CPT

## 2018-12-03 PROCEDURE — 3074F SYST BP LT 130 MM HG: CPT | Performed by: INTERNAL MEDICINE

## 2018-12-03 PROCEDURE — 1160F RVW MEDS BY RX/DR IN RCRD: CPT | Performed by: INTERNAL MEDICINE

## 2018-12-03 NOTE — PROGRESS NOTES
INTERNAL MEDICINE OFFICE VISIT  [facility]  Mina Loza, [place], [address] [facility]  Tel: [phone]      NAME: Mickie Jones  AGE: 68 y o  SEX: female  : 1945   MRN: [id number]    DATE: 12/3/2018  TIME: 11:00 AM      Assessment and Plan:  1  Benign essential hypertension  Continue present medications  - CBC and differential; Future  - Comprehensive metabolic panel; Future  - Lipid panel; Future  - TSH, 3rd generation; Future    2  Chronic obstructive pulmonary disease, unspecified COPD type ([address] )  Continue inhalers    3  Age-related osteoporosis without current pathological fracture  Continue Fosamax    4  Cigarette nicotine dependence without complication  She was counseled to quit smoking    5  Need for vaccination    - PREFERRED: influenza vaccine, 0414-1904, high-dose, PF 0 5 mL, for patients 65 yr+ (FLUZONE HIGH-DOSE)    6  Chronic pain of both knees  She was told to take Tylenol as needed and use a heating pad on the knees  - Counseling Documentation: patient was counseled regarding: diagnostic results, instructions for management, risk factor reductions, prognosis, patient and family education, impressions, risks and benefits of treatment options and importance of compliance with treatment  - Medication Side Effects: Adverse side effects of medications were reviewed with the patient/guardian today  Return for follow up visit in 6 months or earlier, if needed  Chief Complaint:  Chief Complaint   Patient presents with    Well Check     6 month         History of Present Illness:   Hypertension-blood pressure stable on present medication  COPD-she takes her inhalers but does not seem to take the Symbicort twice a day  She does have coughing and wheezing off and on  Osteoporosis-takes Fosamax regularly and is tolerating it well  Knee pain-she has been complaining of some pain in both her knees while going up the steps    Current smoker-she continues to smoke  Active Problem List:  Patient Active Problem List   Diagnosis    Benign essential hypertension    Chronic obstructive pulmonary disease ([facility] )    Cigarette nicotine dependence without complication    Osteoporosis    Urinary incontinence    Chronic pain of both knees         Past Medical History:  Past Medical History:   Diagnosis Date    Abnormal mammogram 1/7/2016    Chronic bronchitis ([facility] )     Last Assessed:  3/23/16    Fibromyalgia     Hypertension     Recurrent major depressive disorder, in partial remission ([facility] ) 12/20/2016         Past Surgical History:  Past Surgical History:   Procedure Laterality Date    COLONOSCOPY           Family History:  History reviewed  No pertinent family history  Social History:  Social History     Social History    Marital status: /Civil Union     Spouse name: N/A    Number of children: N/A    Years of education: N/A     Occupational History    Maintenance      Social History Main Topics    Smoking status: Current Every Day Smoker     Packs/day: 1 00     Years: 50 00     Types: Cigarettes    Smokeless tobacco: Never Used      Comment: Motivated to quit    Alcohol use Yes    Drug use: No    Sexual activity: No     Other Topics Concern    None     Social History Narrative    None         Allergies:  No Known Allergies      Medications:    Current Outpatient Prescriptions:     albuterol (PROVENTIL HFA) 90 mcg/act inhaler, Inhale, Disp: , Rfl:     alendronate (FOSAMAX) 70 mg tablet, TAKE 1 TABLET BY MOUTH ONCE WEEKLY, Disp: 12 tablet, Rfl: 3    amLODIPine-benazepril (LOTREL 5-20) 5-20 MG per capsule, TAKE 1 CAPSULE DAILY  , Disp: 90 capsule, Rfl: 1    budesonide-formoterol (SYMBICORT) 160-4 5 mcg/act inhaler, Inhale 2 puffs 2 (two) times a day, Disp: , Rfl:       The following portions of the patient's history were reviewed and updated as appropriate: past medical history, past surgical history, family history, social history, allergies, current medications and active problem list       Review of Systems:  Constitutional: Denies fever, chills, weight gain, weight loss, fatigue  Eyes: Denies eye redness, eye discharge, double vision, change in visual acuity  ENT: Denies hearing loss, tinnitus, sneezing, nasal congestion, nasal discharge, sore throat   Respiratory: Denies cough, expectoration, hemoptysis, shortness of breath, wheezing  Cardiovascular: Denies chest pain, palpitations, lower extremity swelling, orthopnea, PND  Gastrointestinal: Denies abdominal pain, heartburn, nausea, vomiting, hematemesis, diarrhea, bloody stools  Genito-Urinary: Denies dysuria, frequency, difficulty in micturition, nocturia, incontinence  Musculoskeletal: Has knee pain  Neurologic: Denies confusion, lightheadedness, syncope, headache, focal weakness, sensory changes, seizures  Endocrine: Denies polyuria, polydipsia, temperature intolerance  Allergy and Immunology: Denies hives, insect bite sensitivity  Hematological and Lymphatic: Denies bleeding problems, swollen glands   Psychological: Denies depression, suicidal ideation, anxiety, panic, mood swings  Dermatological: Denies pruritus, rash, skin lesion changes      Vitals:  Vitals:    12/03/18 1045   BP: 122/70   Pulse: 80   SpO2: 95%       Body mass index is 26 01 kg/m²  Weight (last 2 days)     Date/Time   Weight    12/03/18 1045  57 4 (126 6)                Physical Examination:  General: Patient is not in acute distress  Awake, alert, responding to commands  No weight gain or loss  Head: Normocephalic  Atraumatic  Eyes: Conjunctiva and lids with no swelling, erythema or discharge  Both pupils normal sized, round and reactive to light  Sclera nonicteric  ENT: External examination of nose and ear normal  Otoscopic examination shows translucent tympanic membranes with patent canals without erythema  Oropharynx moist with no erythema, edema, exudate or lesions  Neck: Supple  JVP not raised  Trachea midline  No masses  No thyromegaly  Lungs: No signs of increased work of breathing or respiratory distress  Bilateral bronchovascular breath sounds with no crackles or rhonchi  Chest wall: No tenderness  Cardiovascular: Normal PMI  No thrills  Regular rate and rhythm  S1 and S2 normal  No murmur, rub or gallop  Gastrointestinal: Abdomen soft, nontender  No guarding or rigidity  Liver and spleen not palpable  Bowel sounds present  Neurologic: Cranial nerves II-XII intact   Cortical functions normal  Motor system - Reflexes 2+ and symmetrical  Sensations normal  Musculoskeletal: Gait normal  Knee joint tenderness  Integumentary: Skin normal with no rash or lesions  Lymphatic: No palpable lymph nodes in neck, axilla or groin  Extremities: No clubbing, cyanosis, edema or varicosities  Psychological: Judgement and insight normal  Mood and affect normal      Laboratory Results:  CBC with diff:   Lab Results   Component Value Date    WBC 6 38 11/30/2018    WBC 6 50 11/23/2015    RBC 4 28 11/30/2018    RBC 4 37 11/23/2015    HGB 13 6 11/30/2018    HGB 14 0 11/23/2015    HCT 43 8 11/30/2018    HCT 42 9 11/23/2015     (H) 11/30/2018    MCV 98 11/23/2015    MCH 31 8 11/30/2018    MCH 32 0 11/23/2015    RDW 12 5 11/30/2018    RDW 12 9 11/23/2015     11/30/2018     11/23/2015       CMP:  Lab Results   Component Value Date    CREATININE 0 60 11/30/2018    CREATININE 0 85 11/23/2015    BUN 10 11/30/2018    BUN 9 11/23/2015     11/23/2015    K 4 0 11/30/2018    K 3 9 11/23/2015     11/30/2018     11/23/2015    CO2 29 11/30/2018    CO2 29 11/23/2015    GLUCOSE 93 11/23/2015    PROT 7 1 11/23/2015    ALKPHOS 82 11/30/2018    ALKPHOS 110 11/23/2015    ALT 13 11/30/2018    ALT 15 11/23/2015    AST 20 11/30/2018    AST 24 11/23/2015       Lab Results   Component Value Date    HGBA1C 5 9 08/18/2014       No results found for: TROPONINI, CKMB, CKTOTAL    Lipid Profile:   Lab Results Component Value Date    CHOL 157 11/23/2015    CHOL 151 08/18/2014     Lab Results   Component Value Date    HDL 39 (L) 11/30/2018    HDL 42 05/18/2018     Lab Results   Component Value Date    LDLCALC 90 11/30/2018    LDLCALC 85 05/18/2018     Lab Results   Component Value Date    TRIG 109 11/30/2018    TRIG 69 05/18/2018         Health Maintenance:  Health Maintenance   Topic Date Due    Hepatitis C Screening  1945    [facility] PLAN OF CARE  1945    DTaP,Tdap,and Td Vaccines (1 - Tdap) 02/02/1966    Lung Cancer Screening  08/26/2015    CRC Screening: Colonoscopy  01/14/2017    MAMMOGRAM  02/15/2017    INFLUENZA VACCINE  07/01/2018    Pneumococcal PPSV23/PCV13 65+ Years / Low and Medium Risk (2 of 2 - PPSV23) 11/17/2018    Fall Risk  05/25/2019    Depression Screening PHQ  05/25/2019    Urinary Incontinence Screening  05/25/2019    Medicare Annual Wellness Visit (AWV)  05/25/2019     Immunization History   Administered Date(s) Administered    Influenza 11/18/2013    Influenza Split High Dose Preservative Free IM 11/23/2015, 12/20/2016, 11/17/2017    Influenza TIV (IM) 10/06/2010, 11/09/2011, 11/29/2012    Pneumococcal Conjugate 13-Valent 11/17/2017    Pneumococcal Polysaccharide PPV23 11/04/2006         Quin Reardon MD  12/3/2018,11:00 AM

## 2018-12-04 ENCOUNTER — TELEPHONE (OUTPATIENT)
Dept: INTERNAL MEDICINE CLINIC | Facility: CLINIC | Age: 73
End: 2018-12-04

## 2018-12-04 NOTE — TELEPHONE ENCOUNTER
----- Message from Charisma Bhakta MD sent at 12/4/2018  5:27 PM EST -----  Labs ok, please call and inform patient

## 2019-01-29 DIAGNOSIS — J44.9 COPD MIXED TYPE (HCC): Primary | ICD-10-CM

## 2019-01-29 RX ORDER — BUDESONIDE AND FORMOTEROL FUMARATE DIHYDRATE 160; 4.5 UG/1; UG/1
AEROSOL RESPIRATORY (INHALATION)
Qty: 10.2 INHALER | Refills: 5 | Status: SHIPPED | OUTPATIENT
Start: 2019-01-29 | End: 2019-10-18 | Stop reason: SDUPTHER

## 2019-04-26 DIAGNOSIS — I10 ESSENTIAL HYPERTENSION: ICD-10-CM

## 2019-04-26 RX ORDER — AMLODIPINE BESYLATE AND BENAZEPRIL HYDROCHLORIDE 5; 20 MG/1; MG/1
CAPSULE ORAL
Qty: 90 CAPSULE | Refills: 1 | Status: SHIPPED | OUTPATIENT
Start: 2019-04-26 | End: 2019-10-28 | Stop reason: SDUPTHER

## 2019-05-31 ENCOUNTER — APPOINTMENT (OUTPATIENT)
Dept: LAB | Facility: CLINIC | Age: 74
End: 2019-05-31
Payer: COMMERCIAL

## 2019-05-31 DIAGNOSIS — I10 BENIGN ESSENTIAL HYPERTENSION: ICD-10-CM

## 2019-05-31 LAB
ALBUMIN SERPL BCP-MCNC: 3.4 G/DL (ref 3.5–5)
ALP SERPL-CCNC: 80 U/L (ref 46–116)
ALT SERPL W P-5'-P-CCNC: 15 U/L (ref 12–78)
ANION GAP SERPL CALCULATED.3IONS-SCNC: 5 MMOL/L (ref 4–13)
AST SERPL W P-5'-P-CCNC: 20 U/L (ref 5–45)
BASOPHILS # BLD AUTO: 0.06 THOUSANDS/ΜL (ref 0–0.1)
BASOPHILS NFR BLD AUTO: 1 % (ref 0–1)
BILIRUB SERPL-MCNC: 0.34 MG/DL (ref 0.2–1)
BUN SERPL-MCNC: 9 MG/DL (ref 5–25)
CALCIUM SERPL-MCNC: 8 MG/DL (ref 8.3–10.1)
CHLORIDE SERPL-SCNC: 105 MMOL/L (ref 100–108)
CHOLEST SERPL-MCNC: 127 MG/DL (ref 50–200)
CO2 SERPL-SCNC: 27 MMOL/L (ref 21–32)
CREAT SERPL-MCNC: 0.87 MG/DL (ref 0.6–1.3)
EOSINOPHIL # BLD AUTO: 0.06 THOUSAND/ΜL (ref 0–0.61)
EOSINOPHIL NFR BLD AUTO: 1 % (ref 0–6)
ERYTHROCYTE [DISTWIDTH] IN BLOOD BY AUTOMATED COUNT: 12.7 % (ref 11.6–15.1)
GFR SERPL CREATININE-BSD FRML MDRD: 66 ML/MIN/1.73SQ M
GLUCOSE P FAST SERPL-MCNC: 85 MG/DL (ref 65–99)
HCT VFR BLD AUTO: 41.9 % (ref 34.8–46.1)
HDLC SERPL-MCNC: 39 MG/DL (ref 40–60)
HGB BLD-MCNC: 13.5 G/DL (ref 11.5–15.4)
IMM GRANULOCYTES # BLD AUTO: 0.01 THOUSAND/UL (ref 0–0.2)
IMM GRANULOCYTES NFR BLD AUTO: 0 % (ref 0–2)
LDLC SERPL CALC-MCNC: 72 MG/DL (ref 0–100)
LYMPHOCYTES # BLD AUTO: 2.17 THOUSANDS/ΜL (ref 0.6–4.47)
LYMPHOCYTES NFR BLD AUTO: 36 % (ref 14–44)
MCH RBC QN AUTO: 33 PG (ref 26.8–34.3)
MCHC RBC AUTO-ENTMCNC: 32.2 G/DL (ref 31.4–37.4)
MCV RBC AUTO: 102 FL (ref 82–98)
MONOCYTES # BLD AUTO: 0.41 THOUSAND/ΜL (ref 0.17–1.22)
MONOCYTES NFR BLD AUTO: 7 % (ref 4–12)
NEUTROPHILS # BLD AUTO: 3.34 THOUSANDS/ΜL (ref 1.85–7.62)
NEUTS SEG NFR BLD AUTO: 55 % (ref 43–75)
NONHDLC SERPL-MCNC: 88 MG/DL
NRBC BLD AUTO-RTO: 0 /100 WBCS
PLATELET # BLD AUTO: 173 THOUSANDS/UL (ref 149–390)
PMV BLD AUTO: 11.9 FL (ref 8.9–12.7)
POTASSIUM SERPL-SCNC: 3.8 MMOL/L (ref 3.5–5.3)
PROT SERPL-MCNC: 7 G/DL (ref 6.4–8.2)
RBC # BLD AUTO: 4.09 MILLION/UL (ref 3.81–5.12)
SODIUM SERPL-SCNC: 137 MMOL/L (ref 136–145)
TRIGL SERPL-MCNC: 78 MG/DL
TSH SERPL DL<=0.05 MIU/L-ACNC: 2.42 UIU/ML (ref 0.36–3.74)
WBC # BLD AUTO: 6.05 THOUSAND/UL (ref 4.31–10.16)

## 2019-05-31 PROCEDURE — 80053 COMPREHEN METABOLIC PANEL: CPT

## 2019-05-31 PROCEDURE — 85025 COMPLETE CBC W/AUTO DIFF WBC: CPT

## 2019-05-31 PROCEDURE — 36415 COLL VENOUS BLD VENIPUNCTURE: CPT

## 2019-05-31 PROCEDURE — 80061 LIPID PANEL: CPT

## 2019-05-31 PROCEDURE — 84443 ASSAY THYROID STIM HORMONE: CPT

## 2019-06-03 ENCOUNTER — OFFICE VISIT (OUTPATIENT)
Dept: INTERNAL MEDICINE CLINIC | Facility: CLINIC | Age: 74
End: 2019-06-03
Payer: COMMERCIAL

## 2019-06-03 VITALS
OXYGEN SATURATION: 89 % | SYSTOLIC BLOOD PRESSURE: 124 MMHG | BODY MASS INDEX: 24.96 KG/M2 | WEIGHT: 123.8 LBS | HEART RATE: 81 BPM | HEIGHT: 59 IN | DIASTOLIC BLOOD PRESSURE: 62 MMHG

## 2019-06-03 DIAGNOSIS — F17.210 CIGARETTE NICOTINE DEPENDENCE WITHOUT COMPLICATION: ICD-10-CM

## 2019-06-03 DIAGNOSIS — M81.0 AGE-RELATED OSTEOPOROSIS WITHOUT CURRENT PATHOLOGICAL FRACTURE: ICD-10-CM

## 2019-06-03 DIAGNOSIS — I10 BENIGN ESSENTIAL HYPERTENSION: Primary | ICD-10-CM

## 2019-06-03 DIAGNOSIS — Z12.39 SCREENING FOR MALIGNANT NEOPLASM OF BREAST: ICD-10-CM

## 2019-06-03 DIAGNOSIS — J44.9 CHRONIC OBSTRUCTIVE PULMONARY DISEASE, UNSPECIFIED COPD TYPE (HCC): ICD-10-CM

## 2019-06-03 DIAGNOSIS — E66.3 OVERWEIGHT: ICD-10-CM

## 2019-06-03 DIAGNOSIS — N39.46 MIXED STRESS AND URGE URINARY INCONTINENCE: ICD-10-CM

## 2019-06-03 PROBLEM — M25.561 CHRONIC PAIN OF BOTH KNEES: Status: RESOLVED | Noted: 2018-12-03 | Resolved: 2019-06-03

## 2019-06-03 PROBLEM — G89.29 CHRONIC PAIN OF BOTH KNEES: Status: RESOLVED | Noted: 2018-12-03 | Resolved: 2019-06-03

## 2019-06-03 PROBLEM — M25.562 CHRONIC PAIN OF BOTH KNEES: Status: RESOLVED | Noted: 2018-12-03 | Resolved: 2019-06-03

## 2019-06-03 PROCEDURE — 1160F RVW MEDS BY RX/DR IN RCRD: CPT | Performed by: INTERNAL MEDICINE

## 2019-06-03 PROCEDURE — 3078F DIAST BP <80 MM HG: CPT | Performed by: INTERNAL MEDICINE

## 2019-06-03 PROCEDURE — 99214 OFFICE O/P EST MOD 30 MIN: CPT | Performed by: INTERNAL MEDICINE

## 2019-06-03 PROCEDURE — 3008F BODY MASS INDEX DOCD: CPT | Performed by: INTERNAL MEDICINE

## 2019-06-03 PROCEDURE — 4004F PT TOBACCO SCREEN RCVD TLK: CPT | Performed by: INTERNAL MEDICINE

## 2019-06-03 PROCEDURE — 3725F SCREEN DEPRESSION PERFORMED: CPT | Performed by: INTERNAL MEDICINE

## 2019-06-03 PROCEDURE — 3074F SYST BP LT 130 MM HG: CPT | Performed by: INTERNAL MEDICINE

## 2019-06-03 PROCEDURE — 1101F PT FALLS ASSESS-DOCD LE1/YR: CPT | Performed by: INTERNAL MEDICINE

## 2019-09-11 ENCOUNTER — TELEPHONE (OUTPATIENT)
Dept: INTERNAL MEDICINE CLINIC | Facility: CLINIC | Age: 74
End: 2019-09-11

## 2019-09-11 ENCOUNTER — HOSPITAL ENCOUNTER (OUTPATIENT)
Dept: MAMMOGRAPHY | Facility: CLINIC | Age: 74
Discharge: HOME | End: 2019-09-11
Payer: COMMERCIAL

## 2019-09-11 VITALS — BODY MASS INDEX: 25.82 KG/M2 | WEIGHT: 123 LBS | HEIGHT: 58 IN

## 2019-09-11 DIAGNOSIS — M81.0 AGE-RELATED OSTEOPOROSIS WITHOUT CURRENT PATHOLOGICAL FRACTURE: ICD-10-CM

## 2019-09-11 DIAGNOSIS — Z12.39 SCREENING FOR MALIGNANT NEOPLASM OF BREAST: ICD-10-CM

## 2019-09-11 PROCEDURE — 77063 BREAST TOMOSYNTHESIS BI: CPT

## 2019-09-11 PROCEDURE — 77067 SCR MAMMO BI INCL CAD: CPT

## 2019-09-11 PROCEDURE — 77080 DXA BONE DENSITY AXIAL: CPT

## 2019-09-11 NOTE — TELEPHONE ENCOUNTER
----- Message from Ariane Haji DO sent at 9/11/2019 12:01 PM EDT -----  Call patient and let her know her bone density has improved in her lumbar spine  Should continue osteoporosis medication as currently prescribed

## 2019-09-11 NOTE — TELEPHONE ENCOUNTER
----- Message from Beth Reyna DO sent at 9/11/2019 12:57 PM EDT -----  Let patient know mammogram was normal

## 2019-10-03 ENCOUNTER — HOSPITAL ENCOUNTER (EMERGENCY)
Facility: HOSPITAL | Age: 74
Discharge: HOME | End: 2019-10-03
Attending: EMERGENCY MEDICINE | Admitting: EMERGENCY MEDICINE
Payer: COMMERCIAL

## 2019-10-03 VITALS
BODY MASS INDEX: 25.71 KG/M2 | RESPIRATION RATE: 18 BRPM | WEIGHT: 123.02 LBS | SYSTOLIC BLOOD PRESSURE: 130 MMHG | TEMPERATURE: 98.1 F | OXYGEN SATURATION: 98 % | HEART RATE: 98 BPM | DIASTOLIC BLOOD PRESSURE: 71 MMHG

## 2019-10-03 DIAGNOSIS — M79.642 HAND PAIN, LEFT: Primary | ICD-10-CM

## 2019-10-03 DIAGNOSIS — M79.641 HAND PAIN, RIGHT: ICD-10-CM

## 2019-10-03 PROCEDURE — 99283 EMERGENCY DEPT VISIT LOW MDM: CPT

## 2019-10-03 PROCEDURE — 99283 EMERGENCY DEPT VISIT LOW MDM: CPT | Performed by: EMERGENCY MEDICINE

## 2019-10-03 RX ORDER — ACETAMINOPHEN 325 MG/1
650 TABLET ORAL EVERY 6 HOURS PRN
Qty: 30 TABLET | Refills: 0 | Status: SHIPPED | OUTPATIENT
Start: 2019-10-03 | End: 2019-11-15 | Stop reason: ALTCHOICE

## 2019-10-03 RX ORDER — ACETAMINOPHEN 325 MG/1
650 TABLET ORAL ONCE
Status: COMPLETED | OUTPATIENT
Start: 2019-10-03 | End: 2019-10-03

## 2019-10-03 RX ADMIN — ACETAMINOPHEN 650 MG: 325 TABLET ORAL at 10:04

## 2019-10-03 NOTE — ED PROVIDER NOTES
History  Chief Complaint   Patient presents with    Hand Problem     c/o bilateral hand numbness for several weeks  Theresa Gonzales denies CP  Concerned that it might be a side effect of fosamax so kareem rogers it     HPI  77 yo F presents with bilateral hand pain for the last month  She does also get numbness in the tips of her fingers  States pain is worse by the end of the day and better in the morning  Has been prescribed fosamax but is concerned that her symptoms could be a side effect of this so she has not been taking it  Denies confusion, weakness, speech changes  No trauma  Prior to Admission Medications   Prescriptions Last Dose Informant Patient Reported? Taking? SYMBICORT 160-4 5 MCG/ACT inhaler  Self No No   Sig: INHALE 2 PUFFS TWICE DAILY  RINSE MOUTH AFTER USE    albuterol (PROVENTIL HFA) 90 mcg/act inhaler  Self Yes No   Sig: Inhale   alendronate (FOSAMAX) 70 mg tablet  Self No No   Sig: TAKE 1 TABLET BY MOUTH ONCE WEEKLY   amLODIPine-benazepril (LOTREL 5-20) 5-20 MG per capsule  Self No No   Sig: TAKE 1 CAPSULE DAILY  Facility-Administered Medications: None       Past Medical History:   Diagnosis Date    Abnormal mammogram 1/7/2016    Chronic bronchitis (HCC)     Last Assessed:  3/23/16    Chronic pain of both knees 12/3/2018    Fibromyalgia     Hypertension     [facility] 12/20/2016       Past Surgical History:   Procedure Laterality Date    COLONOSCOPY         Family History   Problem Relation Age of Onset    No Known Problems Sister     No Known Problems Sister     No Known Problems Sister      I have reviewed and agree with the history as documented  Social History     Tobacco Use    Smoking status: Current Every Day Smoker     Packs/day: 1 00     Years: 50 00     Pack years: 50 00     Types: Cigarettes    Smokeless tobacco: Never Used    Tobacco comment: Motivated to quit   Substance Use Topics    Alcohol use: Yes    Drug use:  No Review of Systems   Constitutional: Negative for chills and fever  HENT: Negative for dental problem and ear pain  Eyes: Negative for pain and redness  Respiratory: Negative for cough and shortness of breath  Cardiovascular: Negative for chest pain and palpitations  Gastrointestinal: Negative for abdominal pain and nausea  Endocrine: Negative for polydipsia and polyphagia  Genitourinary: Negative for dysuria and frequency  Musculoskeletal: Positive for arthralgias  Negative for joint swelling  Skin: Negative for color change and rash  Neurological: Positive for numbness  Negative for dizziness and headaches  Psychiatric/Behavioral: Negative for behavioral problems and confusion  All other systems reviewed and are negative  Physical Exam  Physical Exam   Constitutional: She is oriented to person, place, and time  She appears well-developed and well-nourished  No distress  HENT:   Head: Atraumatic  Right Ear: External ear normal    Left Ear: External ear normal    Nose: Nose normal    Eyes: Pupils are equal, round, and reactive to light  Conjunctivae and EOM are normal    Neck: Normal range of motion  Neck supple  No JVD present  Cardiovascular: Normal rate, regular rhythm and normal heart sounds  No murmur heard  Pulmonary/Chest: Effort normal and breath sounds normal  No respiratory distress  She has no wheezes  Abdominal: Soft  Bowel sounds are normal  She exhibits no distension  There is no tenderness  Musculoskeletal: Normal range of motion  She exhibits no edema  Mild swelling of fingers at joints, normal strength and sensation both upper extremities, radial pulse 2+ bilaterally   Neurological: She is alert and oriented to person, place, and time  No cranial nerve deficit  Skin: Skin is warm and dry  Capillary refill takes less than 2 seconds  She is not diaphoretic  Psychiatric: She has a normal mood and affect   Her behavior is normal    Nursing note and vitals reviewed  Vital Signs  ED Triage Vitals [10/03/19 0922]   Temperature Pulse Respirations Blood Pressure SpO2   98 1 °F (36 7 °C) 100 18 139/63 97 %      Temp Source Heart Rate Source Patient Position - Orthostatic VS BP Location FiO2 (%)   Oral Monitor Sitting Right arm --      Pain Score       No Pain           Vitals:    10/03/19 0922 10/03/19 1006   BP: 139/63 130/71   Pulse: 100 98   Patient Position - Orthostatic VS: Sitting Sitting         Visual Acuity  Visual Acuity      Most Recent Value   L Pupil Size (mm)  2   R Pupil Size (mm)  2          ED Medications  Medications   acetaminophen (TYLENOL) tablet 650 mg (650 mg Oral Given 10/3/19 1004)       Diagnostic Studies  Results Reviewed     None                 No orders to display              Procedures  Procedures       ED Course                               MDM  Patient presents with hand stiffness, clinically consistent with arthritis, no fevers, no wrist  swelling, doubt  Septic arthritis  No trauma to suggest fracture  Also possible peripheral neuropathy however no sensory changes, negative carpal tunnel physical exam testing  Will have her follow up with her doctor for recheck take Tylenol for pain  Disposition  Final diagnoses:   Hand pain, left   Hand pain, right     Time reflects when diagnosis was documented in both MDM as applicable and the Disposition within this note     Time User Action Codes Description Comment    10/3/2019  9:47 AM Alanna Duverney Add [T86 [id number]] Hand pain, left     10/3/2019  9:48 AM Alanna Duverney Add [S59 [id number]] Hand pain, right       ED Disposition     ED Disposition Condition Date/Time Comment    Discharge Stable Thu Oct 3, 2019  9:47 AM Arti Castellanos discharge to home/self care              Follow-up Information     Follow up With Specialties Details Why Ana María Jolly MD Internal Medicine Schedule an appointment as soon as possible for a visit   [address] [place] [id number]  [phone]            Discharge Medication List as of 10/3/2019  9:50 AM      START taking these medications    Details   acetaminophen (TYLENOL) 325 mg tablet Take 2 tablets (650 mg total) by mouth every 6 (six) hours as needed for mild pain, Starting Thu 10/3/2019, Print         CONTINUE these medications which have NOT CHANGED    Details   albuterol (PROVENTIL HFA) 90 mcg/act inhaler Inhale, Historical Med      alendronate (FOSAMAX) 70 mg tablet TAKE 1 TABLET BY MOUTH ONCE WEEKLY, Normal      amLODIPine-benazepril (LOTREL 5-20) 5-20 MG per capsule TAKE 1 CAPSULE DAILY , Normal      SYMBICORT 160-4 5 MCG/ACT inhaler INHALE 2 PUFFS TWICE DAILY  RINSE MOUTH AFTER USE , Normal           No discharge procedures on file      ED Provider  Electronically Signed by           Bruna Rueda MD  10/03/19 6806

## 2019-10-07 ENCOUNTER — VBI (OUTPATIENT)
Dept: INTERNAL MEDICINE CLINIC | Facility: CLINIC | Age: 74
End: 2019-10-07

## 2019-10-07 NOTE — TELEPHONE ENCOUNTER
Mahi     ED Visit Information     Ed visit date: 10/3/19  Diagnosis Description:Hand pain, left; Hand pain, right   In Network? Yes [place]  Discharge status: Home  Discharged with meds ? Yes  Number of ED visits to date: 1  ED Severity:3     Outreach Information    Outreach successful: No 1  Date letter mailed:yes  Date Finalized:10/9/19    Care Coordination    Follow up appointment with pcp: no 0  Transportation issues ?  NA    [facility] [employer] Outreach    Outreach type:  3 Day Outreach  Emergent necessity warranted by diagnosis:  No  [facility] PCP:  Yes  Practice Contacted Patient ?:  No  Pt had ED follow up with pcp/staff ?:  No    10/07/2019 11:56 AM Phone (Aimee Kim) Evita German (Self) [phone] (R) Remove  No Answer/Busy - att x1    By Bladimir Jurado    letter

## 2019-10-07 NOTE — LETTER
[facility] [address] [id number]    Date: 10/09/19  [place] [address]    Dear Jose Ramon Dale:                                                                                                                              Thank you for choosing [facility] emergency department for care  As your primary care provider we want to make sure that your ongoing medical care is being addressed  If you require follow up care as a result of your emergency department visit, there are a few things we would like you to know  As part of our continuing commitment to caring for our patient, we have added more same day appointments and have extended our office hours to meet your medical needs  After hours, on-call physicians are available via our main office line  We encourage you to contact our office prior to seeking treatment to discuss your symptoms with our medical staff  Together, we can determine the correct course of action  A majority of non-emergent conditions such as: common cold, flu-like symptoms, fevers, strains/sprains, dislocations, minor burns, cuts and animal bites can be treated at [address] [place] [address] facilities  Diagnostic testing is available at some sites  Of course, if you are experiencing a life threatening medical emergency call 911 or proceed directly to the nearest emergency room      Your nearest [address] [place] [address] facility is conveniently located at:    [address] [place] [address]  [address] [phone]    SKIP THE WAIT  Conveniently offered at most [facility] locations  [place] your spot online at www [email or web address] or on the [facility]    Sincerely,    Bernarda Jimenez  Dept: [phone]

## 2019-10-18 ENCOUNTER — OFFICE VISIT (OUTPATIENT)
Dept: INTERNAL MEDICINE CLINIC | Facility: CLINIC | Age: 74
End: 2019-10-18
Payer: COMMERCIAL

## 2019-10-18 VITALS
HEIGHT: 58 IN | OXYGEN SATURATION: 90 % | HEART RATE: 123 BPM | BODY MASS INDEX: 25.31 KG/M2 | DIASTOLIC BLOOD PRESSURE: 78 MMHG | WEIGHT: 120.6 LBS | SYSTOLIC BLOOD PRESSURE: 128 MMHG

## 2019-10-18 DIAGNOSIS — M81.0 AGE-RELATED OSTEOPOROSIS WITHOUT CURRENT PATHOLOGICAL FRACTURE: ICD-10-CM

## 2019-10-18 DIAGNOSIS — R20.0 NUMBNESS OF FINGERS OF BOTH HANDS: Primary | ICD-10-CM

## 2019-10-18 DIAGNOSIS — J44.9 COPD MIXED TYPE (HCC): ICD-10-CM

## 2019-10-18 PROCEDURE — 3008F BODY MASS INDEX DOCD: CPT | Performed by: INTERNAL MEDICINE

## 2019-10-18 PROCEDURE — 99213 OFFICE O/P EST LOW 20 MIN: CPT | Performed by: INTERNAL MEDICINE

## 2019-10-18 RX ORDER — BUDESONIDE AND FORMOTEROL FUMARATE DIHYDRATE 160; 4.5 UG/1; UG/1
AEROSOL RESPIRATORY (INHALATION)
Qty: 10.2 INHALER | Refills: 5 | Status: SHIPPED | OUTPATIENT
Start: 2019-10-18 | End: 2019-11-20 | Stop reason: SDUPTHER

## 2019-10-18 NOTE — PROGRESS NOTES
INTERNAL MEDICINE FOLLOW-UP OFFICE VISIT  [facility] of BEHAVIORAL MEDICINE AT [facility], [place]    NAME: Kong Mello  AGE: 76 y o  SEX: female  : 1945   MRN: [id number]    DATE: 10/18/2019  TIME: 9:04 AM    Assessment and Plan     Diagnoses and all orders for this visit:    Numbness of fingers of both hands  -     EMG 2 Limb Upper Extremity; Future  -     Ambulatory referral to Neurology; Future    Patient was told to get an EMG done and follow up with Neurology  She does not want me to start any medication at this point  Age-related osteoporosis without current pathological fracture  Patient has stopped taking the Fosamax as she thought that it is causing the symptoms of numbness in her hands  She is however taking the medication for over 3 years and I told her to restart it as the recent DEXA scan shows that she still has osteoporosis  - Counseling Documentation: patient was counseled regarding: diagnostic results, instructions for management, risk factor reductions, prognosis, patient and family education, impressions, risks and benefits of treatment options and importance of compliance with treatment  - Medication Side Effects: Adverse side effects of medications were reviewed with the patient/guardian today  Return to office in: as needed    Chief Complaint     Chief Complaint   Patient presents with    Follow-up     Patient's both hand are numb       History of Present Illness     HPI    Patient complains of numbness of both her hands for the last 2 months  She was recently seen in the ER and was told to take Tylenol for the pain in her hands  She is very upset        The following portions of the patient's history were reviewed and updated as appropriate: allergies, current medications, past family history, past medical history, past social history, past surgical history and problem list     Review of Systems     Review of Systems   Constitutional: Negative for chills, diaphoresis, fatigue and fever    HENT: Negative for congestion, ear discharge, ear pain, hearing loss, postnasal drip, rhinorrhea, sinus pressure, sinus pain, sneezing, sore throat and voice change  Eyes: Negative for pain, discharge, redness and visual disturbance  Respiratory: Negative for cough, chest tightness, shortness of breath and wheezing  Cardiovascular: Negative for chest pain, palpitations and leg swelling  Gastrointestinal: Negative for abdominal distention, abdominal pain, blood in stool, constipation, diarrhea, nausea and vomiting  Endocrine: Negative for cold intolerance, heat intolerance, polydipsia, polyphagia and polyuria  Genitourinary: Negative for dysuria, flank pain, frequency, hematuria and urgency  Musculoskeletal: Negative for arthralgias, back pain, gait problem, joint swelling, myalgias, neck pain and neck stiffness  Skin: Negative for rash  Neurological: Positive for numbness  Negative for dizziness, tremors, syncope, facial asymmetry, speech difficulty, weakness, light-headedness and headaches  Complains of numbness in both the hands  She says she has been dropping things and sometimes the hands hurt as well   Hematological: Does not bruise/bleed easily  Psychiatric/Behavioral: Negative for behavioral problems, confusion and sleep disturbance  The patient is not nervous/anxious  Active Problem List     Patient Active Problem List   Diagnosis    Benign essential hypertension    Chronic obstructive pulmonary disease (HCC)    Cigarette nicotine dependence without complication    Osteoporosis    Urinary incontinence    Overweight    Numbness of fingers of both hands       Objective     /78 (BP Location: Left arm, Patient Position: Sitting, Cuff Size: Standard)   Pulse (!) 123   Ht 4' 10" (1 473 m)   Wt 54 7 kg (120 lb 9 6 oz)   SpO2 90%   BMI 25 21 kg/m²     Physical Exam   Constitutional: She is oriented to person, place, and time   She appears well-developed and well-nourished  No distress  HENT:   Head: Normocephalic and atraumatic  Right Ear: External ear normal    Left Ear: External ear normal    Nose: Nose normal    Mouth/Throat: Oropharynx is clear and moist    Eyes: Conjunctivae and EOM are normal  Right eye exhibits no discharge  Left eye exhibits no discharge  No scleral icterus  Neck: Normal range of motion  Neck supple  No JVD present  No tracheal deviation present  No thyromegaly present  Cardiovascular: Normal rate, regular rhythm, normal heart sounds and intact distal pulses  Exam reveals no gallop and no friction rub  No murmur heard  Pulmonary/Chest: Effort normal and breath sounds normal  No respiratory distress  She has no wheezes  She has no rales  She exhibits no tenderness  Abdominal: Soft  Bowel sounds are normal  She exhibits no distension  There is no tenderness  There is no rebound and no guarding  Musculoskeletal: Normal range of motion  She exhibits no edema or tenderness  Lymphadenopathy:     She has no cervical adenopathy  Neurological: She is alert and oriented to person, place, and time  No cranial nerve deficit  She exhibits normal muscle tone  Coordination normal    Skin: Skin is warm and dry  No rash noted  She is not diaphoretic  No erythema  Psychiatric: She has a normal mood and affect  Judgment normal          Current Medications       Current Outpatient Medications:     acetaminophen (TYLENOL) 325 mg tablet, Take 2 tablets (650 mg total) by mouth every 6 (six) hours as needed for mild pain, Disp: 30 tablet, Rfl: 0    albuterol (PROVENTIL HFA) 90 mcg/act inhaler, Inhale, Disp: , Rfl:     alendronate (FOSAMAX) 70 mg tablet, TAKE 1 TABLET BY MOUTH ONCE WEEKLY, Disp: 12 tablet, Rfl: 3    amLODIPine-benazepril (LOTREL 5-20) 5-20 MG per capsule, TAKE 1 CAPSULE DAILY  , Disp: 90 capsule, Rfl: 1    SYMBICORT 160-4 5 MCG/ACT inhaler, INHALE 2 PUFFS TWICE DAILY   RINSE MOUTH AFTER USE , Disp: 10 2 Inhaler, Rfl: 5    Health Maintenance     Health Maintenance   Topic Date Due    Hepatitis C Screening  1945    [facility] PLAN OF CARE  1945    DTaP,Tdap,and Td Vaccines (1 - Tdap) 02/02/1966    CRC Screening: Colonoscopy  01/14/2017    Pneumococcal Vaccine: 65+ Years (2 of 2 - PPSV23) 11/17/2018    Medicare Annual Wellness Visit (AWV)  05/25/2019    INFLUENZA VACCINE  07/01/2019    Fall Risk  06/03/2020    Depression Screening PHQ  06/03/2020    Urinary Incontinence Screening  06/03/2020    BMI: Followup Plan  06/03/2020    MAMMOGRAM  09/11/2020    BMI: Adult  10/03/2020    DXA SCAN  09/11/2021    Pneumococcal Vaccine: Pediatrics (0 to 5 Years) and At-Risk Patients (6 to 59 Years)  Aged Out    HEPATITIS B VACCINES  Aged [employer] History   Administered Date(s) Administered    INFLUENZA 11/18/2013    Influenza Split High Dose Preservative Free IM 11/23/2015, 12/20/2016, 11/17/2017    Influenza TIV (IM) 10/06/2010, 11/09/2011, 11/29/2012    Influenza, high dose seasonal 0 5 mL 12/03/2018    Pneumococcal Conjugate 13-Valent 11/17/2017    Pneumococcal Polysaccharide PPV23 11/04/2006         MD Pita Sigala E Katelyn Khalil of BEHAVIORAL MEDICINE AT [facility], [place]

## 2019-10-24 ENCOUNTER — TELEPHONE (OUTPATIENT)
Dept: NEUROLOGY | Facility: CLINIC | Age: 74
End: 2019-10-24

## 2019-10-25 DIAGNOSIS — M81.0 AGE-RELATED OSTEOPOROSIS WITHOUT CURRENT PATHOLOGICAL FRACTURE: ICD-10-CM

## 2019-10-25 RX ORDER — ALENDRONATE SODIUM 70 MG/1
TABLET ORAL
Qty: 12 TABLET | Refills: 3 | Status: SHIPPED | OUTPATIENT
Start: 2019-10-25

## 2019-10-28 DIAGNOSIS — I10 ESSENTIAL HYPERTENSION: ICD-10-CM

## 2019-10-28 RX ORDER — AMLODIPINE BESYLATE AND BENAZEPRIL HYDROCHLORIDE 5; 20 MG/1; MG/1
CAPSULE ORAL
Qty: 90 CAPSULE | Refills: 1 | Status: SHIPPED | OUTPATIENT
Start: 2019-10-28 | End: 2019-11-11

## 2019-10-30 ENCOUNTER — TELEPHONE (OUTPATIENT)
Dept: INTERNAL MEDICINE CLINIC | Facility: CLINIC | Age: 74
End: 2019-10-30

## 2019-10-30 ENCOUNTER — PROCEDURE VISIT (OUTPATIENT)
Dept: NEUROLOGY | Facility: CLINIC | Age: 74
End: 2019-10-30
Payer: COMMERCIAL

## 2019-10-30 DIAGNOSIS — R20.0 NUMBNESS OF FINGERS OF BOTH HANDS: ICD-10-CM

## 2019-10-30 PROCEDURE — 95911 NRV CNDJ TEST 9-10 STUDIES: CPT | Performed by: PHYSICAL MEDICINE & REHABILITATION

## 2019-10-30 PROCEDURE — 95886 MUSC TEST DONE W/N TEST COMP: CPT | Performed by: PHYSICAL MEDICINE & REHABILITATION

## 2019-10-30 NOTE — TELEPHONE ENCOUNTER
----- Message from Papi Chua MD sent at 10/30/2019  6:08 PM EDT -----  Right carpal tunnel syndrome and left ulnar nerve disease seen on the EMG  Please see the neurologist as advised    Order is in

## 2019-10-30 NOTE — PROGRESS NOTES
EMG 2 Limb Upper Extremity     Date/Time 10/30/2019 11:17 AM     Performed by  Chuy Franklin MD     Authorized by Randell Torres MD      Universal Protocol Consent: Verbal consent obtained  Risks and benefits: risks, benefits and alternatives were discussed  Consent given by: patient  Patient understanding: patient states understanding of the procedure being performed  Patient consent: the patient's understanding of the procedure matches consent given  Patient identity confirmed: verbally with patient               EMG REPORT   80-year-old female presents with numbness and tingling of both  hands for the last 2 months  She denies any neck pain  Symptoms  wake her up from sleep  Patient is being evaluated for a focal neuropathy  Exam: 5/5 BL UE,reflexes 2+    The left  and right median and ulnar motor conduction velocities and compound muscle action potentials were normal with normal distal latencies across the wrists  The left median, bilateral radial and  right ulnar sensory conduction velocity and sensory action potentials were also normal with normal distal latencies across the wrists  The right median sensory peak latency was prolonged at 4 1 milliseconds with a normal sensory action potential amplitude  The left ulnar sensory peak latency was prolonged at 3 7 milliseconds with normal sensory action potential amplitude  The right median palmar evoked response was prolonged by 0 8 milliseconds as compared to the right ulnar palmar evoked response at the same distance  The left median and ulnar palmar evoked response was normal      The left and right median and ulnar F wave latencies were within normal limits  Concentric needle EMG  was performed on various proximal and distal muscles of the bilateral upper extremities including deltoid, biceps, triceps, pronator teres, apb, FDI and low cervical paraspinals  There was no evidence of active denervation in any of the muscles tested    Early recruited motor units appear normal   Recruitment patterns were full or full for effort  INTERPRETATION: There is electrophysiologic evidence of a:    1  Mild median nerve compression neuropathy at the wrist on the right with demyelinative changes, consistent with a diagnosis of carpal tunnel syndrome  2   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

## 2019-11-11 ENCOUNTER — TELEPHONE (OUTPATIENT)
Dept: INTERNAL MEDICINE CLINIC | Facility: CLINIC | Age: 74
End: 2019-11-11

## 2019-11-11 ENCOUNTER — TELEPHONE (OUTPATIENT)
Dept: NEUROLOGY | Facility: CLINIC | Age: 74
End: 2019-11-11

## 2019-11-11 DIAGNOSIS — I10 ESSENTIAL HYPERTENSION: Primary | ICD-10-CM

## 2019-11-11 RX ORDER — BENAZEPRIL HYDROCHLORIDE 20 MG/1
20 TABLET ORAL DAILY
Qty: 90 TABLET | Refills: 3 | Status: SHIPPED | OUTPATIENT
Start: 2019-11-11 | End: 2020-01-17 | Stop reason: HOSPADM

## 2019-11-11 RX ORDER — AMLODIPINE BESYLATE 5 MG/1
5 TABLET ORAL DAILY
Qty: 90 TABLET | Refills: 3 | Status: SHIPPED | OUTPATIENT
Start: 2019-11-11 | End: 2020-01-17 | Stop reason: HOSPADM

## 2019-11-11 NOTE — TELEPHONE ENCOUNTER
Called patient to see if she would be available to see Dr Cade Jones tomorrow November 12 at 1230  I do have the time on hold for the patient  No answer  Left voicemail

## 2019-11-11 NOTE — TELEPHONE ENCOUNTER
[facility] called regarding pt's prescription for amlodipine-benazepril 5-20mg  Medication is on back order, needs alternative       Send to [facility] [address]

## 2019-11-15 ENCOUNTER — CONSULT (OUTPATIENT)
Dept: NEUROLOGY | Facility: CLINIC | Age: 74
End: 2019-11-15
Payer: COMMERCIAL

## 2019-11-15 VITALS
HEIGHT: 58 IN | WEIGHT: 119.8 LBS | DIASTOLIC BLOOD PRESSURE: 68 MMHG | HEART RATE: 76 BPM | BODY MASS INDEX: 25.15 KG/M2 | SYSTOLIC BLOOD PRESSURE: 110 MMHG

## 2019-11-15 DIAGNOSIS — R20.0 NUMBNESS OF FINGERS OF BOTH HANDS: ICD-10-CM

## 2019-11-15 DIAGNOSIS — R29.2 HYPERREFLEXIA: Primary | ICD-10-CM

## 2019-11-15 PROCEDURE — 99204 OFFICE O/P NEW MOD 45 MIN: CPT | Performed by: PSYCHIATRY & NEUROLOGY

## 2019-11-15 NOTE — PROGRESS NOTES
Johnna Mitchell is a 76 y o  female  Chief Complaint   Patient presents with    Numbness     hands       Assessment:  1  Hyperreflexia    2  Numbness of fingers of both hands          Discussion:  Patient's recent EMG results were reviewed, she has mild carpal tunnel syndrome on the wrist on the right and a mild ulnar sensory neuropathy at the wrist on the left, patient also has hyper reflexia and hence would recommend an MRI scan of the cervical spine to evaluate for hyper reflexia, also would recommend blood work to rule out neuropathy, patient to call me after the above test to discuss the results, she was given a prescription for bilateral wrist splints and also was advised to go for occupational therapy for the hands, if she does not get relief with these measures then would consider medications like Neurontin or seeing a hand surgeon for carpal tunnel syndrome and ulnar neuropathy but since it is mild we will hold off on that for now and try conservative measures, she was advised to quit smoking and cessation counseling was done, she was advised to keep her blood pressure cholesterol and sugar under control, to go to the hospital if has any worsening symptoms and call me otherwise to see me back in 2 months and follow up with her other physicians  Subjective:    HPI   Patient is here for evaluation of numbness and tingling in both her hands for the last 2-3 months, it is more or less constant and sometimes she has to shake her hand, she occasionally would have some neck discomfort, she has been dropping things from her hands and sometimes also has difficulty in feeding temperature sensations, no history of trauma, no symptoms in the lower extremity, she does have some urinary urgency, no bowel and bladder incontinence, no vision or speech difficulty, no headache or dizziness, no other neurological complaints      Vitals:    11/15/19 0828   BP: 110/68   BP Location: Left arm   Patient Position: Sitting   Cuff Size: Adult   Pulse: 76   Weight: 54 3 kg (119 lb 12 8 oz)   Height: 4' 10" (1 473 m)       Current Medications    Current Outpatient Medications:     albuterol (PROVENTIL HFA) 90 mcg/act inhaler, Inhale every 4 (four) hours as needed , Disp: , Rfl:     alendronate (FOSAMAX) 70 mg tablet, TAKE 1 TABLET BY MOUTH ONCE WEEKLY, Disp: 12 tablet, Rfl: 3    amLODIPine (NORVASC) 5 mg tablet, Take 1 tablet (5 mg total) by mouth daily, Disp: 90 tablet, Rfl: 3    benazepril (LOTENSIN) 20 mg tablet, Take 1 tablet (20 mg total) by mouth daily, Disp: 90 tablet, Rfl: 3    SYMBICORT 160-4 5 MCG/ACT inhaler, INHALE 2 PUFFS TWICE DAILY  RINSE MOUTH AFTER USE , Disp: 10 2 Inhaler, Rfl: 5      Allergies  Patient has no known allergies  Past Medical History  Past Medical History:   Diagnosis Date    Abnormal mammogram 1/7/2016    Chronic bronchitis (HCC)     Last Assessed:  3/23/16    Chronic pain of both knees 12/3/2018    Fibromyalgia     History of aneurysm 2004    Hypertension     Recurrent major depressive disorder, in partial remission ([facility] [address] ) 12/20/2016         Past Surgical History:  Past Surgical History:   Procedure Laterality Date    CEREBRAL ANEURYSM REPAIR  2004    COLONOSCOPY           Family History:  Family History   Problem Relation Age of Onset    No Known Problems Sister     No Known Problems Sister     No Known Problems Sister     Heart disease Mother     Heart attack Father        Social History:   reports that she has been smoking cigarettes  She has a 50 00 pack-year smoking history  She has never used smokeless tobacco  She reports that she drank alcohol  She reports that she does not use drugs  I have reviewed the past medical history, surgical history, social and family history, current medications, allergies vitals, review of systems, and updated this information as appropriate today  Objective:    Physical Exam    Neurological Exam    GENERAL:  Cooperative in no acute distress  Well-developed and well-nourished    HEAD and NECK   Head is atraumatic normocephalic with no lesions or masses  Neck is supple with full range of motion    CARDIOVASCULAR  Carotid Arteries-no carotid bruits  NEUROLOGIC:  Mental Status-the patient is awake alert and oriented without aphasia or apraxia  Cranial Nerves: Visual fields are full to confrontation  Discs are flat  Limited exam as unable to dilate the pupils, patient did not bring her reading glasses and hence could not evaluate the visual acuity, she has cataract, Extraocular movements are full without nystagmus  Pupils are 2-1/2 mm and reactive  Face is symmetrical to light touch  Movements of facial expression move symmetrically  Hearing is normal to finger rub bilaterally  Soft palate lifts symmetrically  Shoulder shrug is symmetrical  Tongue is midline without atrophy  Motor: No drift is noted on arm extension  Strength is full in the upper and lower extremities with normal bulk and tone  Sensory: Intact to temperature and vibratory sensation in the upper and lower extremities bilaterally  Cortical function is intact  Coordination: Finger to nose testing is performed accurately  Romberg is negative  Gait reveals a normal base with symmetrical arm swing  Tandem walk is normal   Reflexes:   2+ in the upper extremity, 3+ in bilateral lower extremities,    Toes are downgoing  Paraspinal muscle tenderness in the cervical area  Negative Tinel sign  ROS:  Review of Systems   Constitutional: Positive for fatigue  Negative for appetite change, chills and fever  HENT: Negative  Negative for hearing loss, tinnitus, trouble swallowing and voice change  Eyes: Negative  Negative for photophobia, pain and visual disturbance  Respiratory: Positive for wheezing  Negative for shortness of breath  Cardiovascular: Negative  Negative for chest pain and palpitations  Gastrointestinal: Negative  Negative for nausea and vomiting     Endocrine: Negative  Negative for cold intolerance and heat intolerance  Genitourinary: Positive for frequency  Negative for dysuria and urgency  Musculoskeletal: Positive for back pain and gait problem  Negative for arthralgias, myalgias, neck pain and neck stiffness  Skin: Negative  Negative for rash  Allergic/Immunologic: Negative  Neurological: Positive for numbness (both hands)  Negative for dizziness, tremors, seizures, syncope, facial asymmetry, speech difficulty, weakness, light-headedness and headaches  Hematological: Negative  Does not bruise/bleed easily  Psychiatric/Behavioral: Negative  Negative for confusion, decreased concentration, hallucinations and sleep disturbance

## 2019-11-20 DIAGNOSIS — J44.9 COPD MIXED TYPE (HCC): ICD-10-CM

## 2019-11-20 RX ORDER — BUDESONIDE AND FORMOTEROL FUMARATE DIHYDRATE 160; 4.5 UG/1; UG/1
2 AEROSOL RESPIRATORY (INHALATION) 2 TIMES DAILY
Qty: 30.6 G | Refills: 3 | Status: SHIPPED | OUTPATIENT
Start: 2019-11-20

## 2019-11-20 NOTE — TELEPHONE ENCOUNTER
Please advise, patient needs a 90 day supply of Symbicort, medication pended for 90 day supply     (30 6g)

## 2019-11-25 ENCOUNTER — TELEPHONE (OUTPATIENT)
Dept: NEUROLOGY | Facility: CLINIC | Age: 74
End: 2019-11-25

## 2019-12-03 ENCOUNTER — APPOINTMENT (OUTPATIENT)
Dept: LAB | Facility: CLINIC | Age: 74
End: 2019-12-03
Payer: COMMERCIAL

## 2019-12-03 DIAGNOSIS — I10 BENIGN ESSENTIAL HYPERTENSION: ICD-10-CM

## 2019-12-03 DIAGNOSIS — R20.0 NUMBNESS OF FINGERS OF BOTH HANDS: ICD-10-CM

## 2019-12-03 LAB
ALBUMIN SERPL BCP-MCNC: 3.8 G/DL (ref 3.5–5)
ALP SERPL-CCNC: 117 U/L (ref 46–116)
ALT SERPL W P-5'-P-CCNC: 33 U/L (ref 12–78)
ANION GAP SERPL CALCULATED.3IONS-SCNC: 5 MMOL/L (ref 4–13)
AST SERPL W P-5'-P-CCNC: 58 U/L (ref 5–45)
BASOPHILS # BLD AUTO: 0.07 THOUSANDS/ΜL (ref 0–0.1)
BASOPHILS NFR BLD AUTO: 1 % (ref 0–1)
BILIRUB SERPL-MCNC: 0.44 MG/DL (ref 0.2–1)
BUN SERPL-MCNC: 13 MG/DL (ref 5–25)
CALCIUM SERPL-MCNC: 8.9 MG/DL (ref 8.3–10.1)
CHLORIDE SERPL-SCNC: 108 MMOL/L (ref 100–108)
CHOLEST SERPL-MCNC: 158 MG/DL (ref 50–200)
CO2 SERPL-SCNC: 28 MMOL/L (ref 21–32)
CREAT SERPL-MCNC: 0.78 MG/DL (ref 0.6–1.3)
EOSINOPHIL # BLD AUTO: 0.04 THOUSAND/ΜL (ref 0–0.61)
EOSINOPHIL NFR BLD AUTO: 1 % (ref 0–6)
ERYTHROCYTE [DISTWIDTH] IN BLOOD BY AUTOMATED COUNT: 12.5 % (ref 11.6–15.1)
EST. AVERAGE GLUCOSE BLD GHB EST-MCNC: 120 MG/DL
FOLATE SERPL-MCNC: 8.8 NG/ML (ref 3.1–17.5)
GFR SERPL CREATININE-BSD FRML MDRD: 75 ML/MIN/1.73SQ M
GLUCOSE P FAST SERPL-MCNC: 88 MG/DL (ref 65–99)
HBA1C MFR BLD: 5.8 % (ref 4.2–6.3)
HCT VFR BLD AUTO: 43.6 % (ref 34.8–46.1)
HDLC SERPL-MCNC: 43 MG/DL
HGB BLD-MCNC: 13.5 G/DL (ref 11.5–15.4)
IMM GRANULOCYTES # BLD AUTO: 0.01 THOUSAND/UL (ref 0–0.2)
IMM GRANULOCYTES NFR BLD AUTO: 0 % (ref 0–2)
LDLC SERPL CALC-MCNC: 102 MG/DL (ref 0–100)
LYMPHOCYTES # BLD AUTO: 1.49 THOUSANDS/ΜL (ref 0.6–4.47)
LYMPHOCYTES NFR BLD AUTO: 28 % (ref 14–44)
MCH RBC QN AUTO: 31.8 PG (ref 26.8–34.3)
MCHC RBC AUTO-ENTMCNC: 31 G/DL (ref 31.4–37.4)
MCV RBC AUTO: 103 FL (ref 82–98)
MONOCYTES # BLD AUTO: 0.34 THOUSAND/ΜL (ref 0.17–1.22)
MONOCYTES NFR BLD AUTO: 6 % (ref 4–12)
NEUTROPHILS # BLD AUTO: 3.41 THOUSANDS/ΜL (ref 1.85–7.62)
NEUTS SEG NFR BLD AUTO: 64 % (ref 43–75)
NONHDLC SERPL-MCNC: 115 MG/DL
NRBC BLD AUTO-RTO: 0 /100 WBCS
PLATELET # BLD AUTO: 180 THOUSANDS/UL (ref 149–390)
PMV BLD AUTO: 11.7 FL (ref 8.9–12.7)
POTASSIUM SERPL-SCNC: 4.4 MMOL/L (ref 3.5–5.3)
PROT SERPL-MCNC: 7.4 G/DL (ref 6.4–8.2)
RBC # BLD AUTO: 4.25 MILLION/UL (ref 3.81–5.12)
SODIUM SERPL-SCNC: 141 MMOL/L (ref 136–145)
TRIGL SERPL-MCNC: 66 MG/DL
TSH SERPL DL<=0.05 MIU/L-ACNC: 2.05 UIU/ML (ref 0.36–3.74)
VIT B12 SERPL-MCNC: 325 PG/ML (ref 100–900)
WBC # BLD AUTO: 5.36 THOUSAND/UL (ref 4.31–10.16)

## 2019-12-03 PROCEDURE — 84165 PROTEIN E-PHORESIS SERUM: CPT

## 2019-12-03 PROCEDURE — 84166 PROTEIN E-PHORESIS/URINE/CSF: CPT | Performed by: PATHOLOGY

## 2019-12-03 PROCEDURE — 80053 COMPREHEN METABOLIC PANEL: CPT

## 2019-12-03 PROCEDURE — 84166 PROTEIN E-PHORESIS/URINE/CSF: CPT | Performed by: PSYCHIATRY & NEUROLOGY

## 2019-12-03 PROCEDURE — 80061 LIPID PANEL: CPT

## 2019-12-03 PROCEDURE — 86618 LYME DISEASE ANTIBODY: CPT

## 2019-12-03 PROCEDURE — 84443 ASSAY THYROID STIM HORMONE: CPT

## 2019-12-03 PROCEDURE — 83036 HEMOGLOBIN GLYCOSYLATED A1C: CPT

## 2019-12-03 PROCEDURE — 36415 COLL VENOUS BLD VENIPUNCTURE: CPT

## 2019-12-03 PROCEDURE — 84165 PROTEIN E-PHORESIS SERUM: CPT | Performed by: PATHOLOGY

## 2019-12-03 PROCEDURE — 85025 COMPLETE CBC W/AUTO DIFF WBC: CPT

## 2019-12-03 PROCEDURE — 82607 VITAMIN B-12: CPT

## 2019-12-03 PROCEDURE — 82746 ASSAY OF FOLIC ACID SERUM: CPT

## 2019-12-04 ENCOUNTER — TELEPHONE (OUTPATIENT)
Dept: NEUROLOGY | Facility: CLINIC | Age: 74
End: 2019-12-04

## 2019-12-04 LAB — B BURGDOR IGG+IGM SER-ACNC: <0.91 ISR (ref 0–0.9)

## 2019-12-04 NOTE — TELEPHONE ENCOUNTER
----- Message from James Wisdom MD sent at 12/4/2019  3:17 PM EST -----  Please call the patient regarding her abnormal result    Please have the patient follow up with family physician regarding her slightly increased LFTs

## 2019-12-05 LAB
ALBUMIN SERPL ELPH-MCNC: 3.79 G/DL (ref 3.5–5)
ALBUMIN SERPL ELPH-MCNC: 55.7 % (ref 52–65)
ALBUMIN UR ELPH-MCNC: 100 %
ALPHA1 GLOB MFR UR ELPH: 0 %
ALPHA1 GLOB SERPL ELPH-MCNC: 0.35 G/DL (ref 0.1–0.4)
ALPHA1 GLOB SERPL ELPH-MCNC: 5.1 % (ref 2.5–5)
ALPHA2 GLOB MFR UR ELPH: 0 %
ALPHA2 GLOB SERPL ELPH-MCNC: 0.87 G/DL (ref 0.4–1.2)
ALPHA2 GLOB SERPL ELPH-MCNC: 12.8 % (ref 7–13)
B-GLOBULIN MFR UR ELPH: 0 %
BETA GLOB ABNORMAL SERPL ELPH-MCNC: 0.49 G/DL (ref 0.4–0.8)
BETA1 GLOB SERPL ELPH-MCNC: 7.2 % (ref 5–13)
BETA2 GLOB SERPL ELPH-MCNC: 7.4 % (ref 2–8)
BETA2+GAMMA GLOB SERPL ELPH-MCNC: 0.5 G/DL (ref 0.2–0.5)
GAMMA GLOB ABNORMAL SERPL ELPH-MCNC: 0.8 G/DL (ref 0.5–1.6)
GAMMA GLOB MFR UR ELPH: 0 %
GAMMA GLOB SERPL ELPH-MCNC: 11.8 % (ref 12–22)
IGG/ALB SER: 1.26 {RATIO} (ref 1.1–1.8)
PROT PATTERN SERPL ELPH-IMP: ABNORMAL
PROT PATTERN UR ELPH-IMP: NORMAL
PROT SERPL-MCNC: 6.8 G/DL (ref 6.4–8.2)
PROT UR-MCNC: 14 MG/DL

## 2019-12-11 ENCOUNTER — OFFICE VISIT (OUTPATIENT)
Dept: INTERNAL MEDICINE CLINIC | Facility: CLINIC | Age: 74
End: 2019-12-11
Payer: COMMERCIAL

## 2019-12-11 VITALS
HEIGHT: 58 IN | BODY MASS INDEX: 25.02 KG/M2 | HEART RATE: 70 BPM | OXYGEN SATURATION: 94 % | DIASTOLIC BLOOD PRESSURE: 58 MMHG | SYSTOLIC BLOOD PRESSURE: 124 MMHG | WEIGHT: 119.2 LBS

## 2019-12-11 DIAGNOSIS — F17.210 CIGARETTE NICOTINE DEPENDENCE WITHOUT COMPLICATION: ICD-10-CM

## 2019-12-11 DIAGNOSIS — Z23 NEED FOR VACCINATION: ICD-10-CM

## 2019-12-11 DIAGNOSIS — J44.9 CHRONIC OBSTRUCTIVE PULMONARY DISEASE, UNSPECIFIED COPD TYPE (HCC): ICD-10-CM

## 2019-12-11 DIAGNOSIS — Z12.11 SCREEN FOR COLON CANCER: ICD-10-CM

## 2019-12-11 DIAGNOSIS — R20.0 NUMBNESS OF FINGERS OF BOTH HANDS: ICD-10-CM

## 2019-12-11 DIAGNOSIS — Z23 NEED FOR INFLUENZA VACCINATION: ICD-10-CM

## 2019-12-11 DIAGNOSIS — Z11.59 NEED FOR HEPATITIS C SCREENING TEST: ICD-10-CM

## 2019-12-11 DIAGNOSIS — I10 BENIGN ESSENTIAL HYPERTENSION: Primary | ICD-10-CM

## 2019-12-11 DIAGNOSIS — N39.46 MIXED STRESS AND URGE URINARY INCONTINENCE: ICD-10-CM

## 2019-12-11 DIAGNOSIS — M81.0 AGE-RELATED OSTEOPOROSIS WITHOUT CURRENT PATHOLOGICAL FRACTURE: ICD-10-CM

## 2019-12-11 PROCEDURE — 3078F DIAST BP <80 MM HG: CPT | Performed by: INTERNAL MEDICINE

## 2019-12-11 PROCEDURE — 3074F SYST BP LT 130 MM HG: CPT | Performed by: INTERNAL MEDICINE

## 2019-12-11 PROCEDURE — 1160F RVW MEDS BY RX/DR IN RCRD: CPT | Performed by: INTERNAL MEDICINE

## 2019-12-11 PROCEDURE — 99214 OFFICE O/P EST MOD 30 MIN: CPT | Performed by: INTERNAL MEDICINE

## 2019-12-11 PROCEDURE — 90662 IIV NO PRSV INCREASED AG IM: CPT | Performed by: INTERNAL MEDICINE

## 2019-12-11 PROCEDURE — G0009 ADMIN PNEUMOCOCCAL VACCINE: HCPCS | Performed by: INTERNAL MEDICINE

## 2019-12-11 PROCEDURE — 90732 PPSV23 VACC 2 YRS+ SUBQ/IM: CPT | Performed by: INTERNAL MEDICINE

## 2019-12-11 PROCEDURE — 3008F BODY MASS INDEX DOCD: CPT | Performed by: INTERNAL MEDICINE

## 2019-12-11 PROCEDURE — 4004F PT TOBACCO SCREEN RCVD TLK: CPT | Performed by: INTERNAL MEDICINE

## 2019-12-11 PROCEDURE — G0008 ADMIN INFLUENZA VIRUS VAC: HCPCS | Performed by: INTERNAL MEDICINE

## 2019-12-11 PROCEDURE — 4040F PNEUMOC VAC/ADMIN/RCVD: CPT | Performed by: INTERNAL MEDICINE

## 2019-12-11 RX ORDER — ALBUTEROL SULFATE 90 UG/1
2 AEROSOL, METERED RESPIRATORY (INHALATION) EVERY 4 HOURS PRN
Qty: 1 INHALER | Refills: 1 | Status: SHIPPED | OUTPATIENT
Start: 2019-12-11

## 2019-12-11 NOTE — PROGRESS NOTES
INTERNAL MEDICINE OFFICE VISIT  [facility]  [address], [place], [address] [place] [address]  Tel: [phone]      NAME: Mil Freeman  AGE: 76 y o  SEX: female  : 1945   MRN: [id number]    DATE: 2019  TIME: 12:09 PM      Assessment and Plan:  1  Benign essential hypertension    Continue present medication  - CBC and differential; Future  - Comprehensive metabolic panel; Future  - Lipid panel; Future  - TSH, 3rd generation; Future    2  Chronic obstructive pulmonary disease, unspecified COPD type ([facility] [address] )   continue inhalers as advised  - albuterol (PROVENTIL HFA) 90 mcg/act inhaler; Inhale 2 puffs every 4 (four) hours as needed for shortness of breath  Dispense: 1 Inhaler; Refill: 1    3  Numbness of fingers of both hands   follow up with Neurology  Is going to have an MRI of the cervical spine soon    4  Mixed stress and urge urinary incontinence   she was told to do Kegel exercises    5  Age-related osteoporosis without current pathological fracture   continue Fosamax    6  Cigarette nicotine dependence without complication    Tobacco Cessation Counseling: Tobacco cessation counseling was provided  The patient is sincerely urged to quit consumption of tobacco  She is not ready to quit tobacco        7  Screen for colon cancer    - Cologuard; Future    8  Need for influenza vaccination    - influenza vaccine, 3019-3251, high-dose, PF 0 5 mL (FLUZONE HIGH-DOSE)    9  Need for hepatitis C screening test    - Hepatitis C antibody; Future    10  Need for vaccination    - PNEUMOCOCCAL POLYSACCHARIDE VACCINE 23-VALENT =>1YO SQ IM      - Counseling Documentation: patient was counseled regarding: diagnostic results, instructions for management, risk factor reductions, prognosis, patient and family education, impressions, risks and benefits of treatment options and importance of compliance with treatment  - Medication Side Effects:  Adverse side effects of medications were reviewed with the patient/guardian today  Return for follow up visit in  6 months or earlier, if needed  Chief Complaint:  Chief Complaint   Patient presents with    Well Check     6 month         History of Present Illness:    hypertension- blood pressure stable   COPD -well controlled on the inhalers but still has shortness of breath off and on   Numbness of fingers -has been going to the neurologist and is being worked up for it  Incontinence -stable   Osteoporosis-takes Fosamax and tolerating it well  Current smoker-continues to smoke        Active Problem List:  Patient Active Problem List   Diagnosis    Benign essential hypertension    Chronic obstructive pulmonary disease ([facility] [address] )    Cigarette nicotine dependence without complication    Osteoporosis    Urinary incontinence    Overweight    Numbness of fingers of both hands    Hyperreflexia         Past Medical History:  Past Medical History:   Diagnosis Date    Abnormal mammogram 1/7/2016    Chronic bronchitis (HCC)     Last Assessed:  3/23/16    Chronic pain of both knees 12/3/2018    Fibromyalgia     History of aneurysm 2004    Hypertension     Recurrent major depressive disorder, in partial remission ([facility] [address] ) 12/20/2016         Past Surgical History:  Past Surgical History:   Procedure Laterality Date    CEREBRAL ANEURYSM REPAIR  2004    COLONOSCOPY           Family History:  Family History   Problem Relation Age of Onset    No Known Problems Sister     No Known Problems Sister     No Known Problems Sister     Heart disease Mother     Heart attack Father          Social History:  Social History     Socioeconomic History    Marital status: /Civil Union     Spouse name: None    Number of children: None    Years of education: None    Highest education level: None   Occupational History    Occupation: Maintenance   Social Needs    Financial resource strain: None    Food insecurity:     Worry: None     Inability: None  Transportation needs:     Medical: None     Non-medical: None   Tobacco Use    Smoking status: Current Every Day Smoker     Packs/day: 1 00     Years: 50 00     Pack years: 50 00     Types: Cigarettes    Smokeless tobacco: Never Used    Tobacco comment: Motivated to quit   Substance and Sexual Activity    Alcohol use: Not Currently    Drug use: No    Sexual activity: Never     Partners: Male   Lifestyle    Physical activity:     Days per week: 0 days     Minutes per session: 0 min    Stress:  To some extent   Relationships    Social connections:     Talks on phone: None     Gets together: None     Attends Confucianist service: None     Active member of club or organization: None     Attends meetings of clubs or organizations: None     Relationship status: None    Intimate partner violence:     Fear of current or ex partner: None     Emotionally abused: None     Physically abused: None     Forced sexual activity: None   Other Topics Concern    None   Social History Narrative    None         Allergies:  No Known Allergies      Medications:    Current Outpatient Medications:     albuterol (PROVENTIL HFA) 90 mcg/act inhaler, Inhale 2 puffs every 4 (four) hours as needed for shortness of breath, Disp: 1 Inhaler, Rfl: 1    alendronate (FOSAMAX) 70 mg tablet, TAKE 1 TABLET BY MOUTH ONCE WEEKLY, Disp: 12 tablet, Rfl: 3    amLODIPine (NORVASC) 5 mg tablet, Take 1 tablet (5 mg total) by mouth daily, Disp: 90 tablet, Rfl: 3    benazepril (LOTENSIN) 20 mg tablet, Take 1 tablet (20 mg total) by mouth daily, Disp: 90 tablet, Rfl: 3    budesonide-formoterol (SYMBICORT) 160-4 5 mcg/act inhaler, Inhale 2 puffs 2 (two) times a day Rinse mouth after use, Disp: 30 6 g, Rfl: 3      The following portions of the patient's history were reviewed and updated as appropriate: past medical history, past surgical history, family history, social history, allergies, current medications and active problem list       Review of Systems:  Constitutional: Denies fever, chills, weight gain, weight loss, fatigue  Eyes: Denies eye redness, eye discharge, double vision, change in visual acuity  ENT: Denies hearing loss, tinnitus, sneezing, nasal congestion, nasal discharge, sore throat   Respiratory: Denies cough, expectoration, hemoptysis, shortness of breath, wheezing  Cardiovascular: Denies chest pain, palpitations, lower extremity swelling, orthopnea, PND  Gastrointestinal: Denies abdominal pain, heartburn, nausea, vomiting, hematemesis, diarrhea, bloody stools  Genito-Urinary: Denies dysuria, frequency, difficulty in micturition, nocturia, incontinence  Musculoskeletal: Denies back pain, joint pain, muscle pain  Neurologic: Denies confusion, lightheadedness, syncope, headache, focal weakness, sensory changes in handsEndocrine: Denies polyuria, polydipsia, temperature intolerance  Allergy and Immunology: Denies hives, insect bite sensitivity  Hematological and Lymphatic: Denies bleeding problems, swollen glands   Psychological: Denies depression, suicidal ideation, anxiety, panic, mood swings  Dermatological: Denies pruritus, rash, skin lesion changes      Vitals:  Vitals:    12/11/19 1107   BP: 124/58   Pulse: 70   SpO2: 94%       Body mass index is 24 91 kg/m²  Weight (last 2 days)     Date/Time   Weight    12/11/19 1107   54 1 (119 2)                Physical Examination:  General: Patient is not in acute distress  Awake, alert, responding to commands  No weight gain or loss  Head: Normocephalic  Atraumatic  Eyes: Conjunctiva and lids with no swelling, erythema or discharge  Both pupils normal sized, round and reactive to light  Sclera nonicteric  ENT: External examination of nose and ear normal  Otoscopic examination shows translucent tympanic membranes with patent canals without erythema  Oropharynx moist with no erythema, edema, exudate or lesions  Neck: Supple  JVP not raised  Trachea midline  No masses   No thyromegaly  Lungs: No signs of increased work of breathing or respiratory distress  Bilateral bronchovascular breath sounds with no crackles or rhonchi  Chest wall: No tenderness  Cardiovascular: Normal PMI  No thrills  Regular rate and rhythm  S1 and S2 normal  No murmur, rub or gallop  Gastrointestinal: Abdomen soft, nontender  No guarding or rigidity  Liver and spleen not palpable  Bowel sounds present  Neurologic: Cranial nerves II-XII intact   Cortical functions normal  Motor system - Reflexes 2+ and symmetrical  Sensations normal  Musculoskeletal: Gait normal  No joint tenderness  Integumentary: Skin normal with no rash or lesions  Lymphatic: No palpable lymph nodes in neck, axilla or groin  Extremities: No clubbing, cyanosis, edema or varicosities  Psychological: Judgement and insight normal  Mood and affect normal      Laboratory Results:  CBC with diff:   Lab Results   Component Value Date    WBC 5 36 12/03/2019    WBC 6 50 11/23/2015    RBC 4 25 12/03/2019    RBC 4 37 11/23/2015    HGB 13 5 12/03/2019    HGB 14 0 11/23/2015    HCT 43 6 12/03/2019    HCT 42 9 11/23/2015     (H) 12/03/2019    MCV 98 11/23/2015    MCH 31 8 12/03/2019    MCH 32 0 11/23/2015    RDW 12 5 12/03/2019    RDW 12 9 11/23/2015     12/03/2019     11/23/2015       CMP:  Lab Results   Component Value Date    CREATININE 0 78 12/03/2019    CREATININE 0 85 11/23/2015    BUN 13 12/03/2019    BUN 9 11/23/2015     11/23/2015    K 4 4 12/03/2019    K 3 9 11/23/2015     12/03/2019     11/23/2015    CO2 28 12/03/2019    CO2 29 11/23/2015    GLUCOSE 93 11/23/2015    PROT 7 1 11/23/2015    ALKPHOS 117 (H) 12/03/2019    ALKPHOS 110 11/23/2015    ALT 33 12/03/2019    ALT 15 11/23/2015    AST 58 (H) 12/03/2019    AST 24 11/23/2015       Lab Results   Component Value Date    HGBA1C 5 8 12/03/2019    HGBA1C 5 9 08/18/2014       No results found for: TROPONINI, CKMB, CKTOTAL    Lipid Profile:   Lab Results   Component Value Date    CHOL 157 11/23/2015    CHOL 151 08/18/2014     Lab Results   Component Value Date    HDL 43 12/03/2019    HDL 39 (L) 05/31/2019     Lab Results   Component Value Date    LDLCALC 102 (H) 12/03/2019    LDLCALC 72 05/31/2019     Lab Results   Component Value Date    TRIG 66 12/03/2019    TRIG 78 05/31/2019       Imaging Results:  EMG 2 Limb Upper Extremity  Magdiel Akbar MD     10/30/2019 11:24 AM        EMG 2 Limb Upper Extremity     Date/Time 10/30/2019 11:17 AM     Performed by  Magdiel Akbar MD     Authorized by Sekou Becerra MD      [place] Protocol Consent: Verbal consent obtained    Risks and benefits: risks, benefits and alternatives were discussed  Consent given by: patient  Patient understanding: patient states understanding of the procedure being   performed  Patient consent: the patient's understanding of the procedure matches   consent given  Patient identity confirmed: verbally with patient            Health Maintenance:  Health Maintenance   Topic Date Due    Hepatitis C Screening  1945    [facility] PLAN OF CARE  1945    DTaP,Tdap,and Td Vaccines (1 - Tdap) 02/02/1956    CRC Screening: Colonoscopy  01/14/2017    Pneumococcal Vaccine: 65+ Years (2 of 2 - PPSV23) 11/17/2018    Medicare Annual Wellness Visit (AWV)  05/25/2019    Influenza Vaccine  07/01/2019    Fall Risk  06/03/2020    Depression Screening PHQ  06/03/2020    Urinary Incontinence Screening  06/03/2020    MAMMOGRAM  09/11/2020    BMI: Adult  11/15/2020    DXA SCAN  09/11/2021    Pneumococcal Vaccine: Pediatrics (0 to 5 Years) and At-Risk Patients (6 to 59 Years)  Aged Out    HIB Vaccine  Aged Out    Hepatitis B Vaccine  Aged Out    IPV Vaccine  Aged Out    Hepatitis A Vaccine  Aged Out    Meningococcal ACWY Vaccine  Aged Out    HPV Vaccine  Aged [employer] History   Administered Date(s) Administered    INFLUENZA 11/18/2013    Influenza Split High Dose Preservative Free IM 11/23/2015, 12/20/2016, 11/17/2017  Influenza TIV (IM) 10/06/2010, 11/09/2011, 11/29/2012    Influenza, high dose seasonal 0 5 mL 12/03/2018, 12/11/2019    Pneumococcal Conjugate 13-Valent 11/17/2017    Pneumococcal Polysaccharide PPV23 11/04/2006, 12/11/2019         Eleni Gallardo MD  12/11/2019,12:09 PM

## 2019-12-12 ENCOUNTER — TELEPHONE (OUTPATIENT)
Dept: NEUROLOGY | Facility: CLINIC | Age: 74
End: 2019-12-12

## 2019-12-12 ENCOUNTER — HOSPITAL ENCOUNTER (OUTPATIENT)
Dept: MRI IMAGING | Facility: CLINIC | Age: 74
Discharge: HOME | End: 2019-12-12
Payer: COMMERCIAL

## 2019-12-12 DIAGNOSIS — G95.20 CERVICAL CORD COMPRESSION WITH MYELOPATHY (HCC): Primary | ICD-10-CM

## 2019-12-12 DIAGNOSIS — R29.2 HYPERREFLEXIA: ICD-10-CM

## 2019-12-12 DIAGNOSIS — R93.89 ABNORMAL MRI: ICD-10-CM

## 2019-12-12 DIAGNOSIS — R20.0 NUMBNESS OF FINGERS OF BOTH HANDS: ICD-10-CM

## 2019-12-12 PROCEDURE — 72141 MRI NECK SPINE W/O DYE: CPT

## 2019-12-12 NOTE — TELEPHONE ENCOUNTER
Discussed with radiologist Dr Soila Sibley, feels patient has some cervical cord compression and possible mets, discussed with patient the results, consulted Neurosurgery and Oncology ASAP, patient to go to the ER if has any worsening symptoms she is not having any weakness at this time

## 2019-12-18 ENCOUNTER — TELEPHONE (OUTPATIENT)
Dept: HEMATOLOGY ONCOLOGY | Facility: CLINIC | Age: 74
End: 2019-12-18

## 2019-12-18 NOTE — TELEPHONE ENCOUNTER
New Patient Encounter    New Patient Intake Form   Patient Details:  Jihan Bhakta  1945  [id number]    Background Information:  [address] starts by opening a telephone encounter and gathering the following information   Who is calling to schedule? If not self, relationship to patient? self   Referring Provider Dr Rebecca Perez   What is the diagnosis? Abnormal MRI, possible Mets   When was the diagnosis? 12/2019   Is patient aware of diagnosis? Yes   Reason for visit? NP DX   Have you had any testing done? If so: when, where? Yes   Are records in [employer]? yes   Was the patient told to bring a disk? no   Scheduling Information:   Preferred Bowler:  [place]     Requesting Specific Provider? no   Are there any dates/time the patient cannot be seen? Not Friday the 20th      Miscellaneous: n/a    After completing the above information, please route to Financial Counselor and the appropriate Nurse Navigator for review

## 2019-12-27 ENCOUNTER — TELEPHONE (OUTPATIENT)
Dept: NEUROSURGERY | Facility: CLINIC | Age: 74
End: 2019-12-27

## 2019-12-27 NOTE — TELEPHONE ENCOUNTER
Called pt and moved appt originally scheduled on 01/06/20 to 12/31/19 due to provider's opening clinic on 12/31

## 2019-12-31 ENCOUNTER — CONSULT (OUTPATIENT)
Dept: NEUROSURGERY | Facility: CLINIC | Age: 74
End: 2019-12-31
Payer: COMMERCIAL

## 2019-12-31 VITALS
WEIGHT: 124 LBS | HEIGHT: 58 IN | RESPIRATION RATE: 16 BRPM | HEART RATE: 80 BPM | SYSTOLIC BLOOD PRESSURE: 137 MMHG | BODY MASS INDEX: 26.03 KG/M2 | DIASTOLIC BLOOD PRESSURE: 68 MMHG | TEMPERATURE: 96.8 F

## 2019-12-31 DIAGNOSIS — G95.20 CERVICAL CORD COMPRESSION WITH MYELOPATHY (HCC): ICD-10-CM

## 2019-12-31 DIAGNOSIS — M48.02 CERVICAL SPINAL STENOSIS: Primary | ICD-10-CM

## 2019-12-31 DIAGNOSIS — R93.89 ABNORMAL MRI: ICD-10-CM

## 2019-12-31 PROCEDURE — 99204 OFFICE O/P NEW MOD 45 MIN: CPT | Performed by: NEUROLOGICAL SURGERY

## 2019-12-31 NOTE — PROGRESS NOTES
Assessment/Plan:    No problem-specific Assessment & Plan notes found for this encounter  Problem List Items Addressed This Visit     None      Visit Diagnoses     Cervical spinal stenosis    -  Primary    Relevant Orders    CT cervical spine without contrast    XR spine cervical complete 6+ vw flex/ext/obl    Cervical cord compression with myelopathy (HCC)        Relevant Orders    CT cervical spine without contrast    XR spine cervical complete 6+ vw flex/ext/obl    Abnormal MRI        Relevant Orders    CT cervical spine without contrast    XR spine cervical complete 6+ vw flex/ext/obl            Subjective:      Patient ID: Nancy Johnson is a 76 y o  female  HPI    The following portions of the patient's history were reviewed and updated as appropriate:   She  has a past medical history of Abnormal mammogram (1/7/2016), [facility] ), Chronic pain of both knees (12/3/2018), Fibromyalgia, History of aneurysm (2004), Hypertension, and Recurrent major depressive disorder, in partial remission ([facility] [address] ) (12/20/2016)  She   Patient Active Problem List    Diagnosis Date Noted    Hyperreflexia 11/15/2019    Numbness of fingers of both hands 10/18/2019    Overweight 06/03/2019    Cigarette nicotine dependence without complication 22/74/7216    Osteoporosis 01/06/2016    Urinary incontinence 11/23/2015    Benign essential hypertension 01/09/2014    Chronic obstructive pulmonary disease ([facility] [address] ) 01/09/2014     She  has a past surgical history that includes Colonoscopy and Cerebral aneurysm repair (2004)  Her family history includes Heart attack in her father; Heart disease in her mother; No Known Problems in her sister, sister, and sister  She  reports that she has been smoking cigarettes  She has a 50 00 pack-year smoking history  She has never used smokeless tobacco  She reports that she drank alcohol  She reports that she does not use drugs    Current Outpatient Medications   Medication Sig Dispense Refill    albuterol (PROVENTIL HFA) 90 mcg/act inhaler Inhale 2 puffs every 4 (four) hours as needed for shortness of breath 1 Inhaler 1    alendronate (FOSAMAX) 70 mg tablet TAKE 1 TABLET BY MOUTH ONCE WEEKLY 12 tablet 3    amLODIPine (NORVASC) 5 mg tablet Take 1 tablet (5 mg total) by mouth daily 90 tablet 3    benazepril (LOTENSIN) 20 mg tablet Take 1 tablet (20 mg total) by mouth daily 90 tablet 3    budesonide-formoterol (SYMBICORT) 160-4 5 mcg/act inhaler Inhale 2 puffs 2 (two) times a day Rinse mouth after use 30 6 g 3     No current facility-administered medications for this visit  Current Outpatient Medications on File Prior to Visit   Medication Sig    albuterol (PROVENTIL HFA) 90 mcg/act inhaler Inhale 2 puffs every 4 (four) hours as needed for shortness of breath    alendronate (FOSAMAX) 70 mg tablet TAKE 1 TABLET BY MOUTH ONCE WEEKLY    amLODIPine (NORVASC) 5 mg tablet Take 1 tablet (5 mg total) by mouth daily    benazepril (LOTENSIN) 20 mg tablet Take 1 tablet (20 mg total) by mouth daily    budesonide-formoterol (SYMBICORT) 160-4 5 mcg/act inhaler Inhale 2 puffs 2 (two) times a day Rinse mouth after use     No current facility-administered medications on file prior to visit  She has No Known Allergies       Review of Systems   Constitutional: Positive for appetite change (loss of appitite, reported some weight loss) and fatigue  Negative for chills and fever  HENT: Negative  Eyes: Positive for visual disturbance (cataracts)  Respiratory: Positive for shortness of breath (COPD) and wheezing (COPD)  Cardiovascular: Negative  Gastrointestinal: Negative  Endocrine: Negative  Genitourinary: Negative  Musculoskeletal: Positive for arthralgias, gait problem (poor gait, some leg weakness) and myalgias  Negative for back pain and neck pain (B/L arm pain L>R)  Skin: Negative  Allergic/Immunologic: Negative      Neurological: Positive for weakness (UE and LE) and numbness (b/L arms)  Hematological: Does not bruise/bleed easily  Psychiatric/Behavioral: Negative for sleep disturbance (due to pain )  Objective:      /68 (BP Location: Left arm, Patient Position: Sitting, Cuff Size: Standard)   Pulse 80   Temp (!) 96 8 °F (36 °C) (Tympanic)   Resp 16   Ht 4' 10" (1 473 m)   Wt 56 2 kg (124 lb)   BMI 25 92 kg/m²          Physical Exam      I have personally obtain history and examined patient  I have personally reviewed case including all pertinent investigations/studies  Time spent 80 minutes  More than 50% of total time spent on counseling and coordination of care as described above including patient education, discussion of risks and rationale of conservative vs surgical treatment options  HPI    5-6 months hx of progressive weakness, loss of fine motor, hand dexterity, paraesthesia, unsteady gait with increasing neck pain  Hx of COPD/smoking, HTN, OP, aneurysm clipping in [address] [place] [address] 2004       Exam    Moderate restriction in cervical ROM  Negative spurling's  Severe paravertebral spasm  Grade 4/5 UE and LE, 4-/5 left UE  Reduced sensation in hands in nonradicular distribution  Generalized UE and LE hyperreflexia  Positive charlie's L>R with spreading of brachioradialis reflex  Severe deficit in fine motor and coordination  Wide based gait with unsteady tandem                        Back:     Symmetric, no curvature, ROM normal, no CVA tenderness       Chest wall:    No tenderness or deformity                   Extremities:   Extremities normal, atraumatic, no cyanosis or edema       Skin:   Skin color, texture, turgor normal, no rashes or lesions     Radiology    MRI    Severe cervical spinals stenosis C2-5 with deborah cord compression and myelomalacia secondary to concentric spondylotic degneneration  Canal diameter reduced to < 4-5 mm    Diffuse multilevel spinal and rib bone marrow replacement suspicious for metastatic disease    Summary and Plan    Ms Jimi Leach has progressive cervical myelopathy in the setting of severe multilevel cervical spinal stenosis with deborah cord compression/myelomalacia  Given her smoking, her MRI finding are also concerning for neoplastic process  I understand that she is scheduled for oncology evaluation on 01/28/2020  With respect to her cervical spine, I see no role for conservative treatment  We discussed the natural history of her condition including the risk of progression vs catastrophic spinal cord injury in the setting of trauma/accident  I asked her to refrain from chiropractic manipulation, vigorous head and neck mov't and axial loading  I have provided her with a script for a cane to assist with her walking, ordered an CT as well as an flex/ext xray of her neck for possible surgical planning  I will see her in fu in 6 weeks following her oncology eval to review management/potential surgical options

## 2020-01-12 ENCOUNTER — APPOINTMENT (EMERGENCY)
Dept: RADIOLOGY | Facility: HOSPITAL | Age: 75
DRG: 291 | End: 2020-01-12
Payer: COMMERCIAL

## 2020-01-12 ENCOUNTER — HOSPITAL ENCOUNTER (INPATIENT)
Facility: HOSPITAL | Age: 75
LOS: 5 days | Discharge: HOSPICE | DRG: 291 | End: 2020-01-17
Attending: EMERGENCY MEDICINE | Admitting: INTERNAL MEDICINE
Payer: COMMERCIAL

## 2020-01-12 ENCOUNTER — APPOINTMENT (INPATIENT)
Dept: CT IMAGING | Facility: HOSPITAL | Age: 75
DRG: 291 | End: 2020-01-12
Payer: COMMERCIAL

## 2020-01-12 DIAGNOSIS — K59.00 CONSTIPATION: ICD-10-CM

## 2020-01-12 DIAGNOSIS — I50.9 CONGESTIVE HEART FAILURE (CHF) (HCC): Primary | ICD-10-CM

## 2020-01-12 DIAGNOSIS — C78.7 METASTASES TO THE LIVER (HCC): ICD-10-CM

## 2020-01-12 DIAGNOSIS — I48.91 ATRIAL FIBRILLATION WITH RAPID VENTRICULAR RESPONSE (HCC): ICD-10-CM

## 2020-01-12 DIAGNOSIS — R00.0 TACHYCARDIA: ICD-10-CM

## 2020-01-12 DIAGNOSIS — C80.1 SMALL CELL CARCINOMA (HCC): ICD-10-CM

## 2020-01-12 PROBLEM — R60.1 GENERALIZED EDEMA: Status: ACTIVE | Noted: 2020-01-12

## 2020-01-12 PROBLEM — R74.01 TRANSAMINITIS: Status: ACTIVE | Noted: 2020-01-12

## 2020-01-12 PROBLEM — D72.829 LEUKOCYTOSIS: Status: ACTIVE | Noted: 2020-01-12

## 2020-01-12 LAB
ALBUMIN SERPL BCP-MCNC: 2.7 G/DL (ref 3.5–5)
ALP SERPL-CCNC: 238 U/L (ref 46–116)
ALT SERPL W P-5'-P-CCNC: 184 U/L (ref 12–78)
ANION GAP SERPL CALCULATED.3IONS-SCNC: 7 MMOL/L (ref 4–13)
AST SERPL W P-5'-P-CCNC: 208 U/L (ref 5–45)
BASOPHILS # BLD AUTO: 0 THOUSANDS/ΜL (ref 0–0.1)
BASOPHILS NFR BLD AUTO: 0 % (ref 0–1)
BILIRUB SERPL-MCNC: 0.8 MG/DL (ref 0.2–1)
BUN SERPL-MCNC: 27 MG/DL (ref 5–25)
CALCIUM SERPL-MCNC: 8.2 MG/DL (ref 8.3–10.1)
CHLORIDE SERPL-SCNC: 101 MMOL/L (ref 100–108)
CO2 SERPL-SCNC: 30 MMOL/L (ref 21–32)
CREAT SERPL-MCNC: 0.9 MG/DL (ref 0.6–1.3)
EOSINOPHIL # BLD AUTO: 0 THOUSAND/ΜL (ref 0–0.61)
EOSINOPHIL NFR BLD AUTO: 0 % (ref 0–6)
ERYTHROCYTE [DISTWIDTH] IN BLOOD BY AUTOMATED COUNT: 13.4 % (ref 11.6–15.1)
GFR SERPL CREATININE-BSD FRML MDRD: 63 ML/MIN/1.73SQ M
GLUCOSE SERPL-MCNC: 147 MG/DL (ref 65–140)
HCT VFR BLD AUTO: 39.6 % (ref 34.8–46.1)
HGB BLD-MCNC: 12.5 G/DL (ref 11.5–15.4)
IMM GRANULOCYTES # BLD AUTO: 0.22 THOUSAND/UL (ref 0–0.2)
IMM GRANULOCYTES NFR BLD AUTO: 1 % (ref 0–2)
INR PPP: 1.06 (ref 0.84–1.19)
LYMPHOCYTES # BLD AUTO: 0.66 THOUSANDS/ΜL (ref 0.6–4.47)
LYMPHOCYTES NFR BLD AUTO: 4 % (ref 14–44)
MCH RBC QN AUTO: 32.1 PG (ref 26.8–34.3)
MCHC RBC AUTO-ENTMCNC: 31.6 G/DL (ref 31.4–37.4)
MCV RBC AUTO: 102 FL (ref 82–98)
MONOCYTES # BLD AUTO: 0.85 THOUSAND/ΜL (ref 0.17–1.22)
MONOCYTES NFR BLD AUTO: 6 % (ref 4–12)
NEUTROPHILS # BLD AUTO: 13.65 THOUSANDS/ΜL (ref 1.85–7.62)
NEUTS SEG NFR BLD AUTO: 89 % (ref 43–75)
NRBC BLD AUTO-RTO: 0 /100 WBCS
NT-PROBNP SERPL-MCNC: 4907 PG/ML
PLATELET # BLD AUTO: 187 THOUSANDS/UL (ref 149–390)
PLATELET # BLD AUTO: 199 THOUSANDS/UL (ref 149–390)
PMV BLD AUTO: 11.2 FL (ref 8.9–12.7)
PMV BLD AUTO: 11.3 FL (ref 8.9–12.7)
POTASSIUM SERPL-SCNC: 3.9 MMOL/L (ref 3.5–5.3)
PROT SERPL-MCNC: 6.4 G/DL (ref 6.4–8.2)
PROTHROMBIN TIME: 13.8 SECONDS (ref 11.6–14.5)
RBC # BLD AUTO: 3.89 MILLION/UL (ref 3.81–5.12)
SODIUM SERPL-SCNC: 138 MMOL/L (ref 136–145)
TROPONIN I SERPL-MCNC: 0.05 NG/ML
TROPONIN I SERPL-MCNC: 0.08 NG/ML
WBC # BLD AUTO: 15.38 THOUSAND/UL (ref 4.31–10.16)

## 2020-01-12 PROCEDURE — 99285 EMERGENCY DEPT VISIT HI MDM: CPT | Performed by: EMERGENCY MEDICINE

## 2020-01-12 PROCEDURE — 85025 COMPLETE CBC W/AUTO DIFF WBC: CPT | Performed by: EMERGENCY MEDICINE

## 2020-01-12 PROCEDURE — 80053 COMPREHEN METABOLIC PANEL: CPT | Performed by: EMERGENCY MEDICINE

## 2020-01-12 PROCEDURE — 93005 ELECTROCARDIOGRAM TRACING: CPT

## 2020-01-12 PROCEDURE — 36415 COLL VENOUS BLD VENIPUNCTURE: CPT | Performed by: EMERGENCY MEDICINE

## 2020-01-12 PROCEDURE — 85049 AUTOMATED PLATELET COUNT: CPT | Performed by: INTERNAL MEDICINE

## 2020-01-12 PROCEDURE — 84484 ASSAY OF TROPONIN QUANT: CPT | Performed by: EMERGENCY MEDICINE

## 2020-01-12 PROCEDURE — 99285 EMERGENCY DEPT VISIT HI MDM: CPT

## 2020-01-12 PROCEDURE — 83880 ASSAY OF NATRIURETIC PEPTIDE: CPT | Performed by: EMERGENCY MEDICINE

## 2020-01-12 PROCEDURE — 85610 PROTHROMBIN TIME: CPT | Performed by: INTERNAL MEDICINE

## 2020-01-12 PROCEDURE — 99223 1ST HOSP IP/OBS HIGH 75: CPT | Performed by: INTERNAL MEDICINE

## 2020-01-12 PROCEDURE — 94760 N-INVAS EAR/PLS OXIMETRY 1: CPT

## 2020-01-12 PROCEDURE — 84484 ASSAY OF TROPONIN QUANT: CPT | Performed by: INTERNAL MEDICINE

## 2020-01-12 PROCEDURE — 71046 X-RAY EXAM CHEST 2 VIEWS: CPT

## 2020-01-12 PROCEDURE — 94664 DEMO&/EVAL PT USE INHALER: CPT

## 2020-01-12 PROCEDURE — 74176 CT ABD & PELVIS W/O CONTRAST: CPT

## 2020-01-12 RX ORDER — LEVALBUTEROL 1.25 MG/.5ML
1.25 SOLUTION, CONCENTRATE RESPIRATORY (INHALATION)
Status: DISCONTINUED | OUTPATIENT
Start: 2020-01-13 | End: 2020-01-17 | Stop reason: HOSPADM

## 2020-01-12 RX ORDER — FUROSEMIDE 10 MG/ML
40 INJECTION INTRAMUSCULAR; INTRAVENOUS DAILY
Status: DISCONTINUED | OUTPATIENT
Start: 2020-01-13 | End: 2020-01-15

## 2020-01-12 RX ORDER — FUROSEMIDE 10 MG/ML
40 INJECTION INTRAMUSCULAR; INTRAVENOUS ONCE
Status: COMPLETED | OUTPATIENT
Start: 2020-01-12 | End: 2020-01-12

## 2020-01-12 RX ORDER — ACETAMINOPHEN 325 MG/1
650 TABLET ORAL EVERY 6 HOURS PRN
Status: DISCONTINUED | OUTPATIENT
Start: 2020-01-12 | End: 2020-01-17 | Stop reason: HOSPADM

## 2020-01-12 RX ORDER — AMLODIPINE BESYLATE 5 MG/1
5 TABLET ORAL DAILY
Status: DISCONTINUED | OUTPATIENT
Start: 2020-01-13 | End: 2020-01-13

## 2020-01-12 RX ORDER — LISINOPRIL 5 MG/1
5 TABLET ORAL DAILY
Status: DISCONTINUED | OUTPATIENT
Start: 2020-01-13 | End: 2020-01-17 | Stop reason: HOSPADM

## 2020-01-12 RX ORDER — NICOTINE 21 MG/24HR
1 PATCH, TRANSDERMAL 24 HOURS TRANSDERMAL DAILY
Status: DISCONTINUED | OUTPATIENT
Start: 2020-01-13 | End: 2020-01-17 | Stop reason: HOSPADM

## 2020-01-12 RX ORDER — LORAZEPAM 0.5 MG/1
0.5 TABLET ORAL ONCE
Status: COMPLETED | OUTPATIENT
Start: 2020-01-13 | End: 2020-01-13

## 2020-01-12 RX ORDER — IPRATROPIUM BROMIDE AND ALBUTEROL SULFATE 2.5; .5 MG/3ML; MG/3ML
3 SOLUTION RESPIRATORY (INHALATION)
Status: DISCONTINUED | OUTPATIENT
Start: 2020-01-12 | End: 2020-01-12

## 2020-01-12 RX ORDER — HEPARIN SODIUM 5000 [USP'U]/ML
5000 INJECTION, SOLUTION INTRAVENOUS; SUBCUTANEOUS EVERY 8 HOURS SCHEDULED
Status: DISCONTINUED | OUTPATIENT
Start: 2020-01-12 | End: 2020-01-13

## 2020-01-12 RX ORDER — TRAMADOL HYDROCHLORIDE 50 MG/1
50 TABLET ORAL EVERY 6 HOURS PRN
Status: DISCONTINUED | OUTPATIENT
Start: 2020-01-12 | End: 2020-01-17 | Stop reason: HOSPADM

## 2020-01-12 RX ORDER — MORPHINE SULFATE 4 MG/ML
4 INJECTION, SOLUTION INTRAMUSCULAR; INTRAVENOUS ONCE
Status: COMPLETED | OUTPATIENT
Start: 2020-01-12 | End: 2020-01-12

## 2020-01-12 RX ORDER — LIDOCAINE 50 MG/G
1 PATCH TOPICAL DAILY
Status: DISCONTINUED | OUTPATIENT
Start: 2020-01-13 | End: 2020-01-17 | Stop reason: HOSPADM

## 2020-01-12 RX ORDER — ALBUTEROL SULFATE 90 UG/1
2 AEROSOL, METERED RESPIRATORY (INHALATION) EVERY 4 HOURS PRN
Status: DISCONTINUED | OUTPATIENT
Start: 2020-01-12 | End: 2020-01-17 | Stop reason: HOSPADM

## 2020-01-12 RX ORDER — BUDESONIDE AND FORMOTEROL FUMARATE DIHYDRATE 160; 4.5 UG/1; UG/1
2 AEROSOL RESPIRATORY (INHALATION) 2 TIMES DAILY
Status: DISCONTINUED | OUTPATIENT
Start: 2020-01-12 | End: 2020-01-17 | Stop reason: HOSPADM

## 2020-01-12 RX ADMIN — MORPHINE SULFATE 4 MG: 4 INJECTION INTRAVENOUS at 18:34

## 2020-01-12 RX ADMIN — BUDESONIDE AND FORMOTEROL FUMARATE DIHYDRATE 2 PUFF: 160; 4.5 AEROSOL RESPIRATORY (INHALATION) at 23:26

## 2020-01-12 RX ADMIN — FUROSEMIDE 40 MG: 10 INJECTION, SOLUTION INTRAMUSCULAR; INTRAVENOUS at 18:37

## 2020-01-12 RX ADMIN — IPRATROPIUM BROMIDE AND ALBUTEROL SULFATE 3 ML: 2.5; .5 SOLUTION RESPIRATORY (INHALATION) at 18:39

## 2020-01-12 NOTE — ASSESSMENT & PLAN NOTE
Generalized edema most likely from abdominal malignancy  Will order CT abdomen and pelvis stat  Patient most likely has primary abdominal cancer likely ovarian or hepatic in origin  Other possibility is CHF  In the emergency room lab shows elevated BNP, increased LFT  EKG sinus rhythm with T inversion in inferior leads  Troponin negative  Given 40 mg IV Lasix  Further management based on CT abdomen finding  Will order abdominal ultrasound  Will get serial troponin to rule out ACS  Will order 2D echocardiogram   Consultation based on the finding  Consider Cardiology consult if CHF, consider Oncology consult if malignancy  For now continue Lasix 40 mg IV daily  Patient does not have any focal neurological deficit at this point of time  No spinal compression syndrome

## 2020-01-12 NOTE — H&P
H&P- Gallito Parsons 1945, 76 y o  female MRN: [id number]    Unit/Bed#: ED 19 Encounter: [id number]    Primary Care Provider: Silvana Cuevas MD   Date and time admitted to hospital: 1/12/2020  4:52 PM        * Generalized edema  Overview  This is a 76years old female came to the emergency room because of progressively worsening generalized edema  She was having worsening leg swelling and abdominal wall swelling over last 3 weeks  Also she was having numbness and tingling both hands  Cervical MRI done which showed marrow replacement from possible metastasis  Assessment & Plan  Generalized edema most likely from abdominal malignancy  Will order CT abdomen and pelvis stat  Patient most likely has primary abdominal cancer likely ovarian or hepatic in origin  Other possibility is CHF  In the emergency room lab shows elevated BNP, increased LFT  EKG sinus rhythm with T inversion in inferior leads  Troponin negative  Given 40 mg IV Lasix  Further management based on CT abdomen finding  Will order abdominal ultrasound  Will get serial troponin to rule out ACS  Will order 2D echocardiogram   Consultation based on the finding  Consider Cardiology consult if CHF, consider Oncology consult if malignancy  For now continue Lasix 40 mg IV daily  Patient does not have any focal neurological deficit at this point of time  No spinal compression syndrome  Transaminitis  Assessment & Plan  Patient has increased LFT with alkaline phosphatase high  Bilirubin normal   Will get abdominal ultrasound  No recent travel history  Send acute hepatitis panel    Leukocytosis  Assessment & Plan  Likely from her primary malignancy  Further management based on CT abdomen and ultrasound finding    Numbness of fingers of both hands  Assessment & Plan  From [facility] compression secondary to metastatic disease    Monitor closely for any compression syndrome    Cigarette nicotine dependence without complication  Assessment & Plan  Will give nicotine patch    Chronic obstructive pulmonary disease ([facility] )  Assessment & Plan  Currently not in exacerbation  Continue p r n  Nebs  Continue Symbicort  Benign essential hypertension  Assessment & Plan  Continue home dose of Norvasc and benazepril  Monitor blood pressure closely  VTE Prophylaxis: Heparin  / sequential compression device   Code Status:  Full code  POLST: POLST is not applicable to this patient  Discussion with family:  Patient and family members bedside    Anticipated Length of Stay:  Patient will be admitted on an Inpatient basis with an anticipated length of stay of  more than 2 midnights  Justification for Hospital Stay:  Further workup of ascites    Total Time for Visit, including Counseling / Coordination of Care: Greater than 50% of this total time spent on direct patient counseling and coordination of care  Chief Complaint:   Edema of legs and abdomen    History of Present Illness:    Candy Orellana is a 76 y o  female who presents with worsening edema  Patient noticed that her legs and abdomen getting swollen day by day  Happening over the last 3 weeks  Before that she was having tingling of the hands  She went to see Neurology and Neurosurgery  MRI of C-spine done which showed marrow involvement by metastatic disease  She is supposed to see oncologist   But she never got the appointment yet  Today swelling got to bed she decided to come to the emergency room  Also having some back pain  But no bowel or bladder incontinence  No fever or chills  Denies any shortness of breath  Her edema is up to mid abdomen  Patient denies any chest pain or palpitation  No headache, no dizziness  No recent travel history  Patient also reports unexpected weight loss 10 lb over a month    Review of Systems:    Review of Systems   Constitutional: Positive for unexpected weight change  Negative for chills and fever     HENT: Negative for ear pain, nosebleeds, sneezing, sore throat, tinnitus and voice change  Eyes: Negative for pain and visual disturbance  Respiratory: Negative for cough, chest tightness, shortness of breath and wheezing  Cardiovascular: Positive for leg swelling  Negative for chest pain and palpitations  Gastrointestinal: Positive for abdominal distention  Negative for abdominal pain, blood in stool, nausea and vomiting  Endocrine: Negative for cold intolerance  Genitourinary: Negative for dysuria, flank pain, frequency, hematuria and urgency  Musculoskeletal: Positive for back pain  Negative for joint swelling  Skin: Negative for pallor and rash  Neurological: Negative for dizziness, speech difficulty, light-headedness, numbness and headaches  Psychiatric/Behavioral: Negative for agitation and confusion  All other systems reviewed and are negative  Past Medical and Surgical History:     Past Medical History:   Diagnosis Date    Abnormal mammogram 1/7/2016    [facility]     Last Assessed:  3/23/16    Chronic pain of both knees 12/3/2018    Fibromyalgia     History of aneurysm 2004    Hypertension     Recurrent major depressive disorder, in partial remission ([facility] ) 12/20/2016       Past Surgical History:   Procedure Laterality Date    CEREBRAL ANEURYSM REPAIR  2004    COLONOSCOPY         Meds/Allergies:    Prior to Admission medications    Medication Sig Start Date End Date Taking?  Authorizing Provider   albuterol (PROVENTIL HFA) 90 mcg/act inhaler Inhale 2 puffs every 4 (four) hours as needed for shortness of breath 12/11/19  Yes Fouzia Knowles MD   alendronate (FOSAMAX) 70 mg tablet TAKE 1 TABLET BY MOUTH ONCE WEEKLY 10/25/19  Yes Fouzia Knowles MD   amLODIPine (NORVASC) 5 mg tablet Take 1 tablet (5 mg total) by mouth daily 11/11/19  Yes Fouzia Knowles MD   benazepril (LOTENSIN) 20 mg tablet Take 1 tablet (20 mg total) by mouth daily 11/11/19  Yes Fouzia Knowles MD   budesonide-formoterol (SYMBICORT) 160-4 5 mcg/act inhaler Inhale 2 puffs 2 (two) times a day Rinse mouth after use 11/20/19  Yes Yoli Robles MD     I have reviewed home medications with patient personally      Allergies: No Known Allergies    Social History:     Marital Status: /Civil Union   Occupation:   Patient Pre-hospital Living Situation:  Home  Patient Pre-hospital Level of Mobility:  Ambulatory  Patient Pre-hospital Diet Restrictions:  None  Substance Use History:   Social History     Substance and Sexual Activity   Alcohol Use Not Currently     Social History     Tobacco Use   Smoking Status Current Every Day Smoker    Packs/day: 1 00    Years: 50 00    Pack years: 50 00    Types: Cigarettes   Smokeless Tobacco Never Used   Tobacco Comment    Motivated to quit     Social History     Substance and Sexual Activity   Drug Use No       Family History:    Family History   Problem Relation Age of Onset    No Known Problems Sister     No Known Problems Sister     No Known Problems Sister     Heart disease Mother     Heart attack Father        Physical Exam:     Vitals:   Blood Pressure: 145/65 (01/12/20 1832)  Pulse: 79 (01/12/20 1832)  Temperature: 97 8 °F (36 6 °C) (01/12/20 1640)  Respirations: 18 (01/12/20 1832)  Height: 4' 11" (149 9 cm) (01/12/20 1831)  Weight - Scale: 62 8 kg (138 lb 7 2 oz) (01/12/20 1831)  SpO2: 98 % (01/12/20 1832)    Physical Exam    General- Awake, alert and oriented x 3, looks comfortable  HEENT- Normocephalic, atraumatic, oral mucosa- moist  Neck- Supple, No carotid bruit, no JVD  CVS- Normal S1/ S2, Regular rate and rhythm, edema 3+ lower extremity  Respiratory system- B/L clear breath sounds, no wheezing  Abdomen- Soft, edema present abdominal wall, Bowel sound- present  Genitourinary- No suprapubic tenderness  Skin- No new bruise or rash  Musculoskeletal- No gross deformity  Psych- No acute psychosis  CNS-  No acute focal neurologic deficit noted    Additional Data:     Lab Results: I have personally reviewed pertinent reports  Results from last 7 days   Lab Units 01/12/20  1713   WBC Thousand/uL 15 38*   HEMOGLOBIN g/dL 12 5   HEMATOCRIT % 39 6   PLATELETS Thousands/uL 199   NEUTROS PCT % 89*   LYMPHS PCT % 4*   MONOS PCT % 6   EOS PCT % 0     Results from last 7 days   Lab Units 01/12/20  1713   SODIUM mmol/L 138   POTASSIUM mmol/L 3 9   CHLORIDE mmol/L 101   CO2 mmol/L 30   BUN mg/dL 27*   CREATININE mg/dL 0 90   ANION GAP mmol/L 7   CALCIUM mg/dL 8 2*   ALBUMIN g/dL 2 7*   TOTAL BILIRUBIN mg/dL 0 80   ALK PHOS U/L 238*   ALT U/L 184*   AST U/L 208*   GLUCOSE RANDOM mg/dL 147*                       Imaging: I have personally reviewed pertinent reports  XR chest 2 views   ED Interpretation by Selina Alamo MD (01/12 1749)   Left pleural effusion      CT abdomen pelvis wo contrast    (Results Pending)   US abdomen complete    (Results Pending)     MRI of C-spine done on December 2019  IMPRESSION:        1  Spinal cord compression at C3-4 and C4-5 with cord signal abnormality likely edema  This is on the basis of spondylotic degenerative disease as described above  Consultation with spinal surgery service recommended      2  Sites of marrow replacement identified as detailed above strongly suspicious for metastatic disease      3   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

## 2020-01-12 NOTE — ASSESSMENT & PLAN NOTE
Patient has increased LFT with alkaline phosphatase high  Bilirubin normal   Will get abdominal ultrasound  No recent travel history    Send acute hepatitis panel

## 2020-01-12 NOTE — ASSESSMENT & PLAN NOTE
From [facility] compression secondary to metastatic disease    Monitor closely for any compression syndrome

## 2020-01-12 NOTE — ED PROVIDER NOTES
History  Chief Complaint   Patient presents with    Shortness of Breath     Pt  reports increased SOB and b/l leg swelling over last two weeks   Back Pain     Pt  states seeing neurologist for pinched nerve in back  Pt  reports increased back pain "that has spread all over "       History provided by:  Patient  Shortness of Breath   Severity:  Moderate  Onset quality:  Gradual  Duration:  2 weeks  Timing:  Constant  Progression:  Worsening  Chronicity:  New  Context: smoke exposure and URI    Relieved by:  Nothing  Worsened by:  Exertion  Ineffective treatments:  Inhaler  Associated symptoms: abdominal pain (feels bloated) and wheezing    Associated symptoms: no claudication, no cough, no fever, no sore throat, no sputum production and no syncope    Risk factors: tobacco use    Back Pain   Location:  Lumbar spine (cervical)  Quality:  Aching  Radiates to:  L shoulder  Pain severity:  Moderate  Pain is:  Same all the time  Onset quality:  Gradual  Duration: chronic and seeing specialist and neurosurgeon  Timing:  Constant  Chronicity:  New  Context: not recent injury    Relieved by:  Nothing  Worsened by:  Nothing  Associated symptoms: abdominal pain (feels bloated)    Associated symptoms: no fever        Prior to Admission Medications   Prescriptions Last Dose Informant Patient Reported? Taking?    albuterol (PROVENTIL HFA) 90 mcg/act inhaler   No Yes   Sig: Inhale 2 puffs every 4 (four) hours as needed for shortness of breath   alendronate (FOSAMAX) 70 mg tablet  Self No Yes   Sig: TAKE 1 TABLET BY MOUTH ONCE WEEKLY   amLODIPine (NORVASC) 5 mg tablet  Self No Yes   Sig: Take 1 tablet (5 mg total) by mouth daily   benazepril (LOTENSIN) 20 mg tablet  Self No Yes   Sig: Take 1 tablet (20 mg total) by mouth daily   budesonide-formoterol (SYMBICORT) 160-4 5 mcg/act inhaler  Self No Yes   Sig: Inhale 2 puffs 2 (two) times a day Rinse mouth after use      Facility-Administered Medications: None       Past Medical History:   Diagnosis Date    Abnormal mammogram 1/7/2016    Chronic bronchitis (HCC)     Last Assessed:  3/23/16    Chronic pain of both knees 12/3/2018    Fibromyalgia     History of aneurysm 2004    Hypertension     Recurrent major depressive disorder, in partial remission ([facility] ) 12/20/2016       Past Surgical History:   Procedure Laterality Date    CEREBRAL ANEURYSM REPAIR  2004    COLONOSCOPY         Family History   Problem Relation Age of Onset    No Known Problems Sister     No Known Problems Sister     No Known Problems Sister     Heart disease Mother     Heart attack Father      I have reviewed and agree with the history as documented  Social History     Tobacco Use    Smoking status: Current Every Day Smoker     Packs/day: 1 00     Years: 50 00     Pack years: 50 00     Types: Cigarettes    Smokeless tobacco: Never Used    Tobacco comment: Motivated to quit   Substance Use Topics    Alcohol use: Not Currently    Drug use: No        Review of Systems   Constitutional: Negative for fever  HENT: Negative for sore throat  Respiratory: Positive for shortness of breath and wheezing  Negative for cough and sputum production  Cardiovascular: Negative for claudication and syncope  Gastrointestinal: Positive for abdominal pain (feels bloated)  Musculoskeletal: Positive for back pain  All other systems reviewed and are negative  Physical Exam  Physical Exam   Constitutional: She is oriented to person, place, and time  She appears well-developed and well-nourished  She does not appear ill  HENT:   Head: Normocephalic and atraumatic  Eyes: EOM are normal    Neck: Neck supple  Cardiovascular: Normal rate and regular rhythm  Pulmonary/Chest: Tachypnea noted  She has wheezes  She has rales  Abdominal: Soft  Bowel sounds are normal  She exhibits distension  There is no tenderness  Musculoskeletal: She exhibits edema (equal bilateral 2+ edema in legs)   She exhibits no tenderness  Neurological: She is alert and oriented to person, place, and time  Skin: Skin is warm  Psychiatric: She has a normal mood and affect  Vitals reviewed        Vital Signs  ED Triage Vitals [01/12/20 1640]   Temperature Pulse Respirations Blood Pressure SpO2   97 8 °F (36 6 °C) 92 18 168/73 93 %      Temp src Heart Rate Source Patient Position - Orthostatic VS BP Location FiO2 (%)   -- Monitor Sitting Left arm --      Pain Score       --           Vitals:    01/12/20 1640 01/12/20 1756 01/12/20 1832   BP: 168/73 154/72 145/65   Pulse: 92 86 79   Patient Position - Orthostatic VS: Sitting Sitting Lying         Visual Acuity      ED Medications  Medications   morphine (PF) 4 mg/mL injection 4 mg (has no administration in time range)   furosemide (LASIX) injection 40 mg (has no administration in time range)   ipratropium-albuterol (DUO-NEB) 0 5-2 5 mg/3 mL inhalation solution 3 mL (has no administration in time range)       Diagnostic Studies  Results Reviewed     Procedure Component Value Units Date/Time    NT-BNP PRO [[id number]]  (Abnormal) Collected:  01/12/20 1713    Lab Status:  Final result Specimen:  Blood from Arm, Left Updated:  01/12/20 1750     NT-proBNP 4,907 pg/mL     Troponin I [[id number]]  (Abnormal) Collected:  01/12/20 1713    Lab Status:  Final result Specimen:  Blood from Arm, Left Updated:  01/12/20 1745     Troponin I 0 05 ng/mL     Comprehensive metabolic panel [[id number]]  (Abnormal) Collected:  01/12/20 1713    Lab Status:  Final result Specimen:  Blood from Arm, Left Updated:  01/12/20 1744     Sodium 138 mmol/L      Potassium 3 9 mmol/L      Chloride 101 mmol/L      CO2 30 mmol/L      ANION GAP 7 mmol/L      BUN 27 mg/dL      Creatinine 0 90 mg/dL      Glucose 147 mg/dL      Calcium 8 2 mg/dL       U/L       U/L      Alkaline Phosphatase 238 U/L      Total Protein 6 4 g/dL      Albumin 2 7 g/dL      Total Bilirubin 0 80 mg/dL      eGFR 63 ml/min/1 73sq m Narrative:       National Kidney Disease Foundation guidelines for Chronic Kidney Disease (CKD):     Stage 1 with normal or high GFR (GFR > 90 mL/min/1 73 square meters)    Stage 2 Mild CKD (GFR = 60-89 mL/min/1 73 square meters)    Stage 3A Moderate CKD (GFR = 45-59 mL/min/1 73 square meters)    Stage 3B Moderate CKD (GFR = 30-44 mL/min/1 73 square meters)    Stage 4 Severe CKD (GFR = 15-29 mL/min/1 73 square meters)    Stage 5 End Stage CKD (GFR <15 mL/min/1 73 square meters)  Note: GFR calculation is accurate only with a steady state creatinine    CBC and differential [[id number]]  (Abnormal) Collected:  01/12/20 1713    Lab Status:  Final result Specimen:  Blood from Arm, Left Updated:  01/12/20 1722     WBC 15 38 Thousand/uL      RBC 3 89 Million/uL      Hemoglobin 12 5 g/dL      Hematocrit 39 6 %       fL      MCH 32 1 pg      MCHC 31 6 g/dL      RDW 13 4 %      MPV 11 3 fL      Platelets 829 Thousands/uL      nRBC 0 /100 WBCs      Neutrophils Relative 89 %      Immat GRANS % 1 %      Lymphocytes Relative 4 %      Monocytes Relative 6 %      Eosinophils Relative 0 %      Basophils Relative 0 %      Neutrophils Absolute 13 65 Thousands/µL      Immature Grans Absolute 0 22 Thousand/uL      Lymphocytes Absolute 0 66 Thousands/µL      Monocytes Absolute 0 85 Thousand/µL      Eosinophils Absolute 0 00 Thousand/µL      Basophils Absolute 0 00 Thousands/µL                  XR chest 2 views   ED Interpretation by Pia Perea MD (01/12 5416)   Left pleural effusion      CT abdomen pelvis wo contrast    (Results Pending)              Procedures  ECG 12 Lead Documentation Only  Date/Time: 1/12/2020 6:04 PM  Performed by: Pia Perea MD  Authorized by: Pia Perea MD     Indications / Diagnosis:  Dyspnea  ECG reviewed by me, the ED Provider: yes    Patient location:  ED  Rate:     ECG rate:  80    ECG rate assessment: normal    Rhythm:     Rhythm: sinus rhythm    T waves:     T waves: inverted Inverted:  II, III and aVF             ED Course                               MDM  Number of Diagnoses or Management Options  Congestive heart failure ([facility]F) ([facility] ): new and requires workup     Amount and/or Complexity of Data Reviewed  Clinical lab tests: ordered and reviewed  Tests in the radiology section of CPT®: ordered and reviewed  Independent visualization of images, tracings, or specimens: yes    Risk of Complications, Morbidity, and/or Mortality  Presenting problems: high          Disposition  Final diagnoses:   Congestive heart failure (CHF) ([facility] )     Time reflects when diagnosis was documented in both MDM as applicable and the Disposition within this note     Time User Action Codes Description Comment    1/12/2020  6:16 PM Cite Jacey, [address] [[address]50 9] Congestive heart failure (CHF) [facility])       ED Disposition     ED Disposition Condition Date/Time Comment    Admit Stable Sun Jan 12, 2020  6:16 PM Case was discussed with Caren and the patient's admission status was agreed to be Admission Status: inpatient status to the service of Dr Zoey Wright   Follow-up Information    None         Patient's Medications   Discharge Prescriptions    No medications on file     No discharge procedures on file      ED Provider  Electronically Signed by           Saul Casanova MD  01/12/20 5987

## 2020-01-13 ENCOUNTER — APPOINTMENT (INPATIENT)
Dept: ULTRASOUND IMAGING | Facility: HOSPITAL | Age: 75
DRG: 291 | End: 2020-01-13
Payer: COMMERCIAL

## 2020-01-13 ENCOUNTER — APPOINTMENT (INPATIENT)
Dept: NON INVASIVE DIAGNOSTICS | Facility: HOSPITAL | Age: 75
DRG: 291 | End: 2020-01-13
Payer: COMMERCIAL

## 2020-01-13 LAB
ALBUMIN SERPL BCP-MCNC: 2.5 G/DL (ref 3.5–5)
ALP SERPL-CCNC: 212 U/L (ref 46–116)
ALT SERPL W P-5'-P-CCNC: 190 U/L (ref 12–78)
ANION GAP SERPL CALCULATED.3IONS-SCNC: 9 MMOL/L (ref 4–13)
APTT PPP: 24 SECONDS (ref 23–37)
APTT PPP: 32 SECONDS (ref 23–37)
AST SERPL W P-5'-P-CCNC: 232 U/L (ref 5–45)
ATRIAL RATE: 131 BPM
ATRIAL RATE: 80 BPM
BASOPHILS # BLD AUTO: 0.01 THOUSANDS/ΜL (ref 0–0.1)
BASOPHILS NFR BLD AUTO: 0 % (ref 0–1)
BILIRUB SERPL-MCNC: 1 MG/DL (ref 0.2–1)
BUN SERPL-MCNC: 35 MG/DL (ref 5–25)
CALCIUM SERPL-MCNC: 7.8 MG/DL (ref 8.3–10.1)
CHLORIDE SERPL-SCNC: 103 MMOL/L (ref 100–108)
CO2 SERPL-SCNC: 30 MMOL/L (ref 21–32)
CREAT SERPL-MCNC: 0.93 MG/DL (ref 0.6–1.3)
EOSINOPHIL # BLD AUTO: 0 THOUSAND/ΜL (ref 0–0.61)
EOSINOPHIL NFR BLD AUTO: 0 % (ref 0–6)
ERYTHROCYTE [DISTWIDTH] IN BLOOD BY AUTOMATED COUNT: 13.4 % (ref 11.6–15.1)
ERYTHROCYTE [DISTWIDTH] IN BLOOD BY AUTOMATED COUNT: 13.6 % (ref 11.6–15.1)
GFR SERPL CREATININE-BSD FRML MDRD: 61 ML/MIN/1.73SQ M
GLUCOSE SERPL-MCNC: 113 MG/DL (ref 65–140)
HCT VFR BLD AUTO: 37.2 % (ref 34.8–46.1)
HCT VFR BLD AUTO: 38.1 % (ref 34.8–46.1)
HGB BLD-MCNC: 11.9 G/DL (ref 11.5–15.4)
HGB BLD-MCNC: 12.2 G/DL (ref 11.5–15.4)
IMM GRANULOCYTES # BLD AUTO: 0.16 THOUSAND/UL (ref 0–0.2)
IMM GRANULOCYTES NFR BLD AUTO: 1 % (ref 0–2)
INR PPP: 1.11 (ref 0.84–1.19)
LYMPHOCYTES # BLD AUTO: 0.63 THOUSANDS/ΜL (ref 0.6–4.47)
LYMPHOCYTES NFR BLD AUTO: 5 % (ref 14–44)
MCH RBC QN AUTO: 32.1 PG (ref 26.8–34.3)
MCH RBC QN AUTO: 32.5 PG (ref 26.8–34.3)
MCHC RBC AUTO-ENTMCNC: 32 G/DL (ref 31.4–37.4)
MCHC RBC AUTO-ENTMCNC: 32 G/DL (ref 31.4–37.4)
MCV RBC AUTO: 100 FL (ref 82–98)
MCV RBC AUTO: 102 FL (ref 82–98)
MONOCYTES # BLD AUTO: 0.53 THOUSAND/ΜL (ref 0.17–1.22)
MONOCYTES NFR BLD AUTO: 4 % (ref 4–12)
NEUTROPHILS # BLD AUTO: 12.77 THOUSANDS/ΜL (ref 1.85–7.62)
NEUTS SEG NFR BLD AUTO: 90 % (ref 43–75)
NRBC BLD AUTO-RTO: 0 /100 WBCS
P AXIS: 62 DEGREES
PLATELET # BLD AUTO: 167 THOUSANDS/UL (ref 149–390)
PLATELET # BLD AUTO: 202 THOUSANDS/UL (ref 149–390)
PMV BLD AUTO: 11.1 FL (ref 8.9–12.7)
PMV BLD AUTO: 11.4 FL (ref 8.9–12.7)
POTASSIUM SERPL-SCNC: 4.1 MMOL/L (ref 3.5–5.3)
PR INTERVAL: 110 MS
PROT SERPL-MCNC: 5.9 G/DL (ref 6.4–8.2)
PROTHROMBIN TIME: 14.4 SECONDS (ref 11.6–14.5)
QRS AXIS: 36 DEGREES
QRS AXIS: 39 DEGREES
QRSD INTERVAL: 100 MS
QRSD INTERVAL: 92 MS
QT INTERVAL: 330 MS
QT INTERVAL: 382 MS
QTC INTERVAL: 440 MS
QTC INTERVAL: 516 MS
RBC # BLD AUTO: 3.66 MILLION/UL (ref 3.81–5.12)
RBC # BLD AUTO: 3.8 MILLION/UL (ref 3.81–5.12)
SODIUM SERPL-SCNC: 142 MMOL/L (ref 136–145)
T WAVE AXIS: -63 DEGREES
T WAVE AXIS: 205 DEGREES
TROPONIN I SERPL-MCNC: 0.07 NG/ML
TSH SERPL DL<=0.05 MIU/L-ACNC: 0.45 UIU/ML (ref 0.36–3.74)
VENTRICULAR RATE: 147 BPM
VENTRICULAR RATE: 80 BPM
WBC # BLD AUTO: 14.1 THOUSAND/UL (ref 4.31–10.16)
WBC # BLD AUTO: 14.87 THOUSAND/UL (ref 4.31–10.16)

## 2020-01-13 PROCEDURE — 85730 THROMBOPLASTIN TIME PARTIAL: CPT | Performed by: FAMILY MEDICINE

## 2020-01-13 PROCEDURE — 85025 COMPLETE CBC W/AUTO DIFF WBC: CPT | Performed by: INTERNAL MEDICINE

## 2020-01-13 PROCEDURE — 76700 US EXAM ABDOM COMPLETE: CPT

## 2020-01-13 PROCEDURE — 36415 COLL VENOUS BLD VENIPUNCTURE: CPT | Performed by: INTERNAL MEDICINE

## 2020-01-13 PROCEDURE — 99223 1ST HOSP IP/OBS HIGH 75: CPT | Performed by: PHYSICIAN ASSISTANT

## 2020-01-13 PROCEDURE — 85027 COMPLETE CBC AUTOMATED: CPT | Performed by: FAMILY MEDICINE

## 2020-01-13 PROCEDURE — 93005 ELECTROCARDIOGRAM TRACING: CPT

## 2020-01-13 PROCEDURE — 99222 1ST HOSP IP/OBS MODERATE 55: CPT | Performed by: INTERNAL MEDICINE

## 2020-01-13 PROCEDURE — 80053 COMPREHEN METABOLIC PANEL: CPT | Performed by: INTERNAL MEDICINE

## 2020-01-13 PROCEDURE — 93010 ELECTROCARDIOGRAM REPORT: CPT | Performed by: INTERNAL MEDICINE

## 2020-01-13 PROCEDURE — 84443 ASSAY THYROID STIM HORMONE: CPT | Performed by: INTERNAL MEDICINE

## 2020-01-13 PROCEDURE — 94640 AIRWAY INHALATION TREATMENT: CPT

## 2020-01-13 PROCEDURE — 94760 N-INVAS EAR/PLS OXIMETRY 1: CPT

## 2020-01-13 PROCEDURE — 84484 ASSAY OF TROPONIN QUANT: CPT | Performed by: INTERNAL MEDICINE

## 2020-01-13 PROCEDURE — 99232 SBSQ HOSP IP/OBS MODERATE 35: CPT | Performed by: FAMILY MEDICINE

## 2020-01-13 PROCEDURE — 85610 PROTHROMBIN TIME: CPT | Performed by: FAMILY MEDICINE

## 2020-01-13 RX ORDER — HEPARIN SODIUM 10000 [USP'U]/100ML
3-20 INJECTION, SOLUTION INTRAVENOUS
Status: DISPENSED | OUTPATIENT
Start: 2020-01-13 | End: 2020-01-15

## 2020-01-13 RX ORDER — DILTIAZEM HYDROCHLORIDE 5 MG/ML
10 INJECTION INTRAVENOUS ONCE
Status: COMPLETED | OUTPATIENT
Start: 2020-01-13 | End: 2020-01-13

## 2020-01-13 RX ORDER — METOPROLOL TARTRATE 5 MG/5ML
5 INJECTION INTRAVENOUS ONCE
Status: COMPLETED | OUTPATIENT
Start: 2020-01-13 | End: 2020-01-13

## 2020-01-13 RX ORDER — HEPARIN SODIUM 1000 [USP'U]/ML
3600 INJECTION, SOLUTION INTRAVENOUS; SUBCUTANEOUS AS NEEDED
Status: DISCONTINUED | OUTPATIENT
Start: 2020-01-13 | End: 2020-01-17

## 2020-01-13 RX ORDER — DILTIAZEM HYDROCHLORIDE 5 MG/ML
20 INJECTION INTRAVENOUS ONCE
Status: COMPLETED | OUTPATIENT
Start: 2020-01-13 | End: 2020-01-13

## 2020-01-13 RX ORDER — DILTIAZEM HYDROCHLORIDE 5 MG/ML
15 INJECTION INTRAVENOUS ONCE
Status: COMPLETED | OUTPATIENT
Start: 2020-01-13 | End: 2020-01-13

## 2020-01-13 RX ORDER — HEPARIN SODIUM 1000 [USP'U]/ML
1800 INJECTION, SOLUTION INTRAVENOUS; SUBCUTANEOUS AS NEEDED
Status: DISCONTINUED | OUTPATIENT
Start: 2020-01-13 | End: 2020-01-17

## 2020-01-13 RX ADMIN — HEPARIN SODIUM 5000 UNITS: 5000 INJECTION INTRAVENOUS; SUBCUTANEOUS at 06:07

## 2020-01-13 RX ADMIN — DILTIAZEM HYDROCHLORIDE 20 MG: 5 INJECTION INTRAVENOUS at 14:28

## 2020-01-13 RX ADMIN — DILTIAZEM HYDROCHLORIDE 15 MG: 5 INJECTION INTRAVENOUS at 20:09

## 2020-01-13 RX ADMIN — BUDESONIDE AND FORMOTEROL FUMARATE DIHYDRATE 2 PUFF: 160; 4.5 AEROSOL RESPIRATORY (INHALATION) at 08:44

## 2020-01-13 RX ADMIN — BUDESONIDE AND FORMOTEROL FUMARATE DIHYDRATE 2 PUFF: 160; 4.5 AEROSOL RESPIRATORY (INHALATION) at 18:25

## 2020-01-13 RX ADMIN — METOPROLOL TARTRATE 5 MG: 5 INJECTION INTRAVENOUS at 10:27

## 2020-01-13 RX ADMIN — FUROSEMIDE 40 MG: 10 INJECTION, SOLUTION INTRAMUSCULAR; INTRAVENOUS at 08:39

## 2020-01-13 RX ADMIN — LEVALBUTEROL HYDROCHLORIDE 1.25 MG: 1.25 SOLUTION, CONCENTRATE RESPIRATORY (INHALATION) at 20:35

## 2020-01-13 RX ADMIN — AMLODIPINE BESYLATE 5 MG: 5 TABLET ORAL at 08:45

## 2020-01-13 RX ADMIN — DILTIAZEM HYDROCHLORIDE 30 MG: 30 TABLET, FILM COATED ORAL at 17:34

## 2020-01-13 RX ADMIN — HEPARIN SODIUM 5000 UNITS: 5000 INJECTION INTRAVENOUS; SUBCUTANEOUS at 00:15

## 2020-01-13 RX ADMIN — LIDOCAINE 1 PATCH: 50 PATCH TOPICAL at 08:41

## 2020-01-13 RX ADMIN — LISINOPRIL 5 MG: 5 TABLET ORAL at 08:45

## 2020-01-13 RX ADMIN — IPRATROPIUM BROMIDE 0.5 MG: 0.5 SOLUTION RESPIRATORY (INHALATION) at 08:22

## 2020-01-13 RX ADMIN — IPRATROPIUM BROMIDE 0.5 MG: 0.5 SOLUTION RESPIRATORY (INHALATION) at 20:35

## 2020-01-13 RX ADMIN — LORAZEPAM 0.5 MG: 0.5 TABLET ORAL at 00:15

## 2020-01-13 RX ADMIN — HEPARIN SODIUM 3600 UNITS: 1000 INJECTION INTRAVENOUS; SUBCUTANEOUS at 23:04

## 2020-01-13 RX ADMIN — HEPARIN SODIUM AND DEXTROSE 12 UNITS/KG/HR: 10000; 5 INJECTION INTRAVENOUS at 15:54

## 2020-01-13 RX ADMIN — DILTIAZEM HYDROCHLORIDE 10 MG: 5 INJECTION INTRAVENOUS at 11:52

## 2020-01-13 RX ADMIN — LEVALBUTEROL HYDROCHLORIDE 1.25 MG: 1.25 SOLUTION, CONCENTRATE RESPIRATORY (INHALATION) at 08:22

## 2020-01-13 NOTE — ASSESSMENT & PLAN NOTE
Generalized edema most likely from abdominal malignancy  CT scan of the abdomen pelvis was reviewed  Patient most likely has primary abdominal cancer likely ovarian or hepatic in origin  Other possibility is CHF  Other possibility is secondary to hypoalbuminemia given underlying likely malignancy  In the emergency room lab shows elevated BNP, increased LFT  EKG sinus rhythm with T inversion in inferior leads  Troponin negative  Given 40 mg IV Lasix  Further management based on CT abdomen finding  Will order abdominal ultrasound  Will get serial troponin to rule out ACS  Will order 2D echocardiogram   Consultation based on the finding  Consider Cardiology consult if CHF,   Will check an echocardiogram  For now continue Lasix 40 mg IV daily  Patient does not have any focal neurological deficit at this point of time  No spinal compression syndrome

## 2020-01-13 NOTE — CONSULTS
Inpatient consult to Cardiology  Consult performed by: Melissa Robert MD  Consult ordered by: Canelo Morales MD      Reason for consultation  Tachycardia, questionable heart failure    HPI:  70-year-old female with hypertension, hyperlipidemia, COPD, history of brain aneurysm with possible intracranial bleed requiring surgery in 2002, active smoker presented with complaints of worsening bilateral leg swelling including back and leg pain  Patient also complains abdominal bloating with decreased appetite and unable to eat lately  Her symptoms of bilateral leg swelling is going on for more than 3 months  He is also getting extremely tired on exertion  She has lost about 15-20 lb in last 3 months  She does get short of breath on exertion  She denies chest pain, diaphoresis, night sweat, flu-like symptoms, diarrhea palpitation, dizziness or loss of consciousness  She denies previous history of myocardial infarction, stroke/TIA, congestive heart failure or  cardiac arrhythmia  Patient had a cardiac catheterization in 2004 at [facility] [place] in [place] and everything was okay as per patient  EKG on admission showed sinus rhythm, left atrial enlargement, old septal infarct, nonspecific ST T wave changes  Maximum troponin 0 08  Social history active smoker half pack to 1 pack per day for more than 50 years, denies alcohol intake or illicit drug use  Family history:  Father had MI at age of 71  Patient was noted to have tachycardia on telemetry monitor this morning around 10:00 a m  Neno Jacobs She was given IV Lopressor which did not help followed by IV Cardizem after which heart rate was little better control  Cardiac telemetry monitoring showed AFib with rapid ventricular rate with heart rate between 130-140  Labs including vitals reviewed  Patient was noted to have multiple lesion in liver and abdominal soft tissue highly suspicious for possible malignancy with Mets and she is scheduled for IR guided biopsy  During my exam patient denies chest pain or shortness of breath  Sees complain of feeling tired including low back pain  Physical examination:  General:  Awake, alert and oriented x3, not in distress  Neck:  Unable to assess JVD due to abdominal pain  Eyes:  No icterus  Lungs:  Bilateral air entry positive, decreased air entry in left lung base, mild diffuse end expiratory wheeze, trace crackle  Heart:  Irregularly irregular heart sound,, systolic murmur in aortic area, no rub or gallop  Musculoskeletal, no deformity  Abdomen:  Tender diffusely, decreased bowel sounds  Extremities:  Bilateral pitting edema 2+  Neuro:  Moving all extremities    Assessment:    1  And new onset atrial fibrillation with rapid ventricular rate  CHADSDVASC - atleast 3  Can be due to underlying possible malignancy  Echocardiogram is pending  Patient is actively wheezing and will use Cardizem at present time  2  Decompensated heart failure:  Patient is in mild decompensation  Echocardiogram to evaluate LVEF  Agree with Lasix    3  Bilateral leg edema  Etiology is multifactorial   Contribution from heart failure including 3rd space fluid loss from hypoproteinemia and questionable abdomen malignancy    4  Hypertension:  Controlled    5  Possibly  malignancy  Awaiting for IR guided biopsy    6  Elevated LFTs    7  Active smoker    Recommendation:  Continuous telemetry monitoring  Get a repeat 12 lead EKG   IV Cardizem including oral Cardizem  Use Cardizem p r n   If blood pressure is low, can use IV digoxin  Due to active wheezing patient is not given beta-blocker at present    Keep potassium around 4 and magnesium around 2  Start therapeutic anticoagulation with IV heparin if no contraindication and once safe from Interventional Radiology perspective as patient is going for IR guided biopsy  Daily weight, strict  input and output, fluid restriction to 1 5 L per day  Further management as per primary team  We will follow-up

## 2020-01-13 NOTE — ED NOTES
Dr Michelle Jackson made aware through tiger text of pts HR remaining in the 130's  Beena from cardiology also made aware of consult and situation via tigertext       Padmini Nolan RN  01/13/20 7472

## 2020-01-13 NOTE — UTILIZATION REVIEW
Initial Clinical Review    Admission: Date/Time/Statement: Inpatient Admission Orders (From admission, onward)     Ordered        01/12/20 1817  Inpatient Admission  Once                   Orders Placed This Encounter   Procedures    Inpatient Admission     Standing Status:   Standing     Number of Occurrences:   1     Order Specific Question:   Admitting Physician     Answer:   Emir Gould [[id number]]     Order Specific Question:   Level of Care     Answer:   Med Surg [16]     Order Specific Question:   Estimated length of stay     Answer:   More than 2 Midnights     Order Specific Question:   Certification     Answer:   I certify that inpatient services are medically necessary for this patient for a duration of greater than two midnights  See H&P and MD Progress Notes for additional information about the patient's course of treatment  ED Arrival Information     Expected Arrival Acuity Means of Arrival Escorted By Service Admission Type    - 1/12/2020 16:34 Emergent Wheelchair Family Member General Medicine Emergency    Arrival Complaint    chest pain,Legs Swelling,stomach inflammation         Chief Complaint   Patient presents with    Shortness of Breath     Pt  reports increased SOB and b/l leg swelling over last two weeks   Back Pain     Pt  states seeing neurologist for pinched nerve in back  Pt  reports increased back pain "that has spread all over "     Assessment/Plan:  77 yo female to ED from home w/ worsening edema over the last 3 weeks  Hx of recent tingling in hands, MRI c-spine showed marrow involvement by metastatic disease  also c/o back pain , 10 pound weight loss over the last month   Admitted IP status w / generalized edema most likely from abd malignancy   Plan to check CT abd and pelvis stat  Elevated BNP and LFts , check US , echo , serial trop , consider oncology consult if malignancy , cardiology consult if CHF       PE : + 3 LE edema ,edema present abdominal wall  ED Triage Vitals   Temperature Pulse Respirations Blood Pressure SpO2   01/12/20 1640 01/12/20 1640 01/12/20 1640 01/12/20 1640 01/12/20 1640   97 8 °F (36 6 °C) 92 18 168/73 93 %      Temp src Heart Rate Source Patient Position - Orthostatic VS BP Location FiO2 (%)   -- 01/12/20 1640 01/12/20 1640 01/12/20 1640 --    Monitor Sitting Left arm       Pain Score       01/12/20 1834       4        Wt Readings from Last 1 Encounters:   01/12/20 62 8 kg (138 lb 7 2 oz)     Additional Vital Signs:   01/13/20 0745    87  17  135/74  93 %  Nasal cannula  Lying   01/13/20 0515    64  15  142/63  93 %  Nasal cannula  Lying   01/13/20 0000    96  18  111/59  92 %  None (Room air)  Lying   01/12/20 2137          97 %  Nasal cannula     01/12/20 2136          97 %       01/12/20 2132    84  22    97 %       01/12/20 1832    79  18  145/65  98 %  Nasal cannula  Lying   01/12/20 1756    86    154/72  98 %    Sitting     Pertinent Labs/Diagnostic Test Results:   1/13 IR consult - pending   1/13 US abd - pending   1/12 CT abd - Numerous liver masses replacing a significant portion of the parenchyma consistent with metastases    Left lower lobe pulmonary consolidation or mass, partially imaged  Associated small pleural effusion    Several left anterior subcutaneous soft tissue nodules, the largest measuring 1 9 x 1 5 cm, suspicious for metastasis  1/12 CXR - L pleural effusion   12/2019 MRI c spine- 1   Spinal cord compression at C3-4 and C4-5 with cord signal abnormality likely edema   This is on the basis of spondylotic degenerative disease as described above   Consultation with spinal surgery service recommended    2   Sites of marrow replacement identified as detailed above strongly suspicious for metastatic disease    3   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 as described    1/12 EKG:  Sinus rhythm, possible T inversion in inferior leads  1/13 Echo - pending   Results from last 7 days   Lab Units 01/13/20  0645 01/12/20  2138 01/12/20  1713   WBC Thousand/uL 14 10*  --  15 38*   HEMOGLOBIN g/dL 11 9  --  12 5   HEMATOCRIT % 37 2  --  39 6   PLATELETS Thousands/uL 167 187 199   NEUTROS ABS Thousands/µL 12 77*  --  13 65*     Results from last 7 days   Lab Units 01/13/20  0645 01/12/20  1713   SODIUM mmol/L 142 138   POTASSIUM mmol/L 4 1 3 9   CHLORIDE mmol/L 103 101   CO2 mmol/L 30 30   ANION GAP mmol/L 9 7   BUN mg/dL 35* 27*   CREATININE mg/dL 0 93 0 90   EGFR ml/min/1 73sq m 61 63   CALCIUM mg/dL 7 8* 8 2*     Results from last 7 days   Lab Units 01/13/20  0645 01/12/20  1713   AST U/L 232* 208*   ALT U/L 190* 184*   ALK PHOS U/L 212* 238*   TOTAL PROTEIN g/dL 5 9* 6 4   ALBUMIN g/dL 2 5* 2 7*   TOTAL BILIRUBIN mg/dL 1 00 0 80     Results from last 7 days   Lab Units 01/13/20  0645 01/12/20  1713   GLUCOSE RANDOM mg/dL 113 147*     Results from last 7 days   Lab Units 01/13/20  0020 01/12/20  2138 01/12/20  1713   TROPONIN I ng/mL 0 07* 0 08* 0 05*     Results from last 7 days   Lab Units 01/12/20  2138   PROTIME seconds 13 8   INR  1 06     Results from last 7 days   Lab Units 01/13/20  0645   TSH 3RD GENERATON uIU/mL 0 453     Results from last 7 days   Lab Units 01/12/20  1713   NT-PRO BNP pg/mL 4,907*     ED Treatment:   Medication Administration from 01/12/2020 1634 to 01/13/2020 0931       Date/Time Order Dose Route Action     01/12/2020 1834 morphine (PF) 4 mg/mL injection 4 mg 4 mg Intravenous Given     01/12/2020 1837 furosemide (LASIX) injection 40 mg 40 mg Intravenous Given     01/12/2020 1839 ipratropium-albuterol (DUO-NEB) 0 5-2 5 mg/3 mL inhalation solution 3 mL 3 mL Nebulization Given     01/13/2020 0845 amLODIPine (NORVASC) tablet 5 mg 5 mg Oral Given     01/13/2020 0845 lisinopril (ZESTRIL) tablet 5 mg 5 mg Oral Given     01/13/2020 0844 budesonide-formoterol (SYMBICORT) 160-4 5 mcg/act inhaler 2 puff 2 puff Inhalation Given     01/12/2020 2326 budesonide-formoterol (SYMBICORT) 160-4 5 mcg/act inhaler 2 puff 2 puff Inhalation Given     01/13/2020 0607 heparin (porcine) subcutaneous injection 5,000 Units 5,000 Units Subcutaneous Given     01/13/2020 0015 heparin (porcine) subcutaneous injection 5,000 Units 5,000 Units Subcutaneous Given     01/13/2020 0839 furosemide (LASIX) injection 40 mg 40 mg Intravenous Given     01/13/2020 6038 levalbuterol (XOPENEX) inhalation solution 1 25 mg 1 25 mg Nebulization Given     01/13/2020 0822 ipratropium (ATROVENT) 0 02 % inhalation solution 0 5 mg 0 5 mg Nebulization Given     01/13/2020 0841 lidocaine (LIDODERM) 5 % patch 1 patch 1 patch Topical Medication Applied     01/13/2020 0015 LORazepam (ATIVAN) tablet 0 5 mg 0 5 mg Oral Given        Past Medical History:   Diagnosis Date    Abnormal mammogram 1/7/2016    [facility]     Last Assessed:  3/23/16    Chronic pain of both knees 12/3/2018    Fibromyalgia     History of aneurysm 2004    Hypertension     Recurrent major depressive disorder, in partial remission ([facility] ) 12/20/2016     Present on Admission:   Benign essential hypertension   Chronic obstructive pulmonary disease (HCC)   Cigarette nicotine dependence without complication   Numbness of fingers of both hands   Transaminitis   Generalized edema   Leukocytosis      Admitting Diagnosis: Shortness of breath [R06 02]  Back pain [M54 9]  Age/Sex: 76 y o  female  Admission Orders:  Scheduled Medications:    Medications:  amLODIPine 5 mg Oral Daily   budesonide-formoterol 2 puff Inhalation BID   furosemide 40 mg Intravenous Daily   heparin (porcine) 5,000 Units Subcutaneous Q8H [address]   ipratropium 0 5 mg Nebulization TID   levalbuterol 1 25 mg Nebulization TID   lidocaine 1 patch Topical Daily   lisinopril 5 mg Oral Daily   nicotine 1 patch Transdermal Daily     Continuous IV Infusions:     PRN Meds:    acetaminophen 650 mg Oral Q6H PRN   albuterol 2 puff Inhalation Q4H PRN   traMADol 50 mg Oral Q6H PRN     Tele   Up and OOB as mayank SCD  Cardiac diet 1200 ml fluid restriction   IP CONSULT TO ONCOLOGY    Network Utilization Review Department  [email or web address]  ATTENTION: Please call with any questions or concerns to [phone] and carefully listen to the prompts so that you are directed to the right person  All voicemails are confidential   Allison Muss all requests for admission clinical reviews, approved or denied determinations and any other requests to dedicated fax number below belonging to the campus where the patient is receiving treatment   List of dedicated fax numbers for the Facilities:  [address] DENIALS (Administrative/Medical Necessity) [phone]   [address] [place] [address] (Maternity/NICU/Pediatrics) [phone]   [place] [phone]     [address] [phone]   [place] [phone]   [place] [place] [phone]   [address] [place] [address] [phone]   [facility]  [phone]   [address] [place] [address], [address]  [address] [facility] [address] [facility] [phone]
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
## 2020-01-13 NOTE — PLAN OF CARE
Problem: Prexisting or High Potential for Compromised Skin Integrity  Goal: Skin integrity is maintained or improved  Description  INTERVENTIONS:  - Identify patients at risk for skin breakdown  - Assess and monitor skin integrity  - Assess and monitor nutrition and hydration status  - Monitor labs   - Assess for incontinence   - Turn and reposition patient  - Assist with mobility/ambulation  - Relieve pressure over bony prominences  - Avoid friction and shearing  - Provide appropriate hygiene as needed including keeping skin clean and dry  - Evaluate need for skin moisturizer/barrier cream  - Collaborate with interdisciplinary team   - Patient/family teaching  - Consider wound care consult   Outcome: Progressing     Problem: Potential for Falls  Goal: Patient will remain free of falls  Description  INTERVENTIONS:  - Assess patient frequently for physical needs  -  Identify cognitive and physical deficits and behaviors that affect risk of falls    -  [place] fall precautions as indicated by assessment   - Educate patient/family on patient safety including physical limitations  - Instruct patient to call for assistance with activity based on assessment  - Modify environment to reduce risk of injury  - Consider OT/PT consult to assist with strengthening/mobility  Outcome: Progressing

## 2020-01-13 NOTE — ASSESSMENT & PLAN NOTE
Likely from her primary malignancy  CT scan reviewed  Will ask for a biopsy with Interventional Radiology    Oncology evaluation pending

## 2020-01-13 NOTE — PROGRESS NOTES
Progress Note - Louis Harmon 1945, 76 y o  female MRN: [id number]    Unit/Bed#: ED 19 Encounter: [id number]    Primary Care Provider: Nidia Salazar MD   Date and time admitted to hospital: 1/12/2020  4:52 PM        Leukocytosis  Assessment & Plan  Likely from her primary malignancy  CT scan reviewed  Will ask for a biopsy with Interventional Radiology  Oncology evaluation pending    Transaminitis  Assessment & Plan  Likely secondary to metastatic lesion  Bilirubin normal   Will get abdominal ultrasound  No recent travel history  Send acute hepatitis panel    Numbness of fingers of both hands  Assessment & Plan  From marrow compression secondary to metastatic disease  Monitor closely for any compression syndrome    Cigarette nicotine dependence without complication  Assessment & Plan  Will give nicotine patch    Chronic obstructive pulmonary disease ([facility] [address] )  Assessment & Plan  Currently not in exacerbation  Continue p r n  Nebs  Continue Symbicort  Benign essential hypertension  Assessment & Plan  Continue home dose of Norvasc and benazepril  Monitor blood pressure closely  * Generalized edema  Assessment & Plan  Generalized edema most likely from abdominal malignancy  CT scan of the abdomen pelvis was reviewed  Patient most likely has primary abdominal cancer likely ovarian or hepatic in origin  Other possibility is CHF  Other possibility is secondary to hypoalbuminemia given underlying likely malignancy  In the emergency room lab shows elevated BNP, increased LFT  EKG sinus rhythm with T inversion in inferior leads  Troponin negative  Given 40 mg IV Lasix  Further management based on CT abdomen finding  Will order abdominal ultrasound  Will get serial troponin to rule out ACS  Will order 2D echocardiogram   Consultation based on the finding  Consider Cardiology consult if CHF,   Will check an echocardiogram  For now continue Lasix 40 mg IV daily    Patient does not have any focal neurological deficit at this point of time  No spinal compression syndrome  VTE Pharmacologic Prophylaxis:   Pharmacologic: Heparin  Mechanical VTE Prophylaxis in Place: Yes    Patient Centered Rounds: I have performed bedside rounds with nursing staff today  Education and Discussions with Family / Patient:  Daughter    Time Spent for Care: 20 minutes  More than 50% of total time spent on counseling and coordination of care as described above  Current Length of Stay: 1 day(s)    Current Patient Status: Inpatient   Certification Statement: The patient will continue to require additional inpatient hospital stay due to Still having ascites in need of IV Lasix as well as workup for probable metastatic malignancy unknown primary    Discharge Plan:  Patient will be here at least another 48 hours    Code Status: Level 1 - Full Code      Subjective:   Patient seen examined  States she is feeling little bit better than yesterday  Went home with thinks the breathing treatment helped her as far as her shortness of breath dose  Objective:     Vitals:   Temp (24hrs), Av 8 °F (36 6 °C), Min:97 8 °F (36 6 °C), Max:97 8 °F (36 6 °C)    Temp:  [97 8 °F (36 6 °C)] 97 8 °F (36 6 °C)  HR:  [64-96] 87  Resp:  [15-22] 17  BP: (111-168)/(59-74) 135/74  SpO2:  [92 %-98 %] 93 %  Body mass index is 27 96 kg/m²  Input and Output Summary (last 24 hours):     No intake or output data in the 24 hours ending 20 0808    Physical Exam:     Physical Exam  (   General Appearance:    Alert, cooperative, no distress, appears stated age                               Lungs:     Clear to auscultation bilaterally, respirations unlabored       Heart:    Regular rate and rhythm, S1 and S2 normal, no murmur, rub    or gallop   Abdomen:   Abdominal distension             Extremities:   Extremities normal, atraumatic, no cyanosis or edema       Additional Data:     Labs:    Results from last 7 days   Lab Units 20  0649 WBC Thousand/uL 14 10*   HEMOGLOBIN g/dL 11 9   HEMATOCRIT % 37 2   PLATELETS Thousands/uL 167   NEUTROS PCT % 90*   LYMPHS PCT % 5*   MONOS PCT % 4   EOS PCT % 0     Results from last 7 days   Lab Units 01/13/20  0645   SODIUM mmol/L 142   POTASSIUM mmol/L 4 1   CHLORIDE mmol/L 103   CO2 mmol/L 30   BUN mg/dL 35*   CREATININE mg/dL 0 93   ANION GAP mmol/L 9   CALCIUM mg/dL 7 8*   ALBUMIN g/dL 2 5*   TOTAL BILIRUBIN mg/dL 1 00   ALK PHOS U/L 212*   ALT U/L 190*   AST U/L 232*   GLUCOSE RANDOM mg/dL 113     Results from last 7 days   Lab Units 01/12/20  2138   INR  1 06                       * I Have Reviewed All Lab Data Listed Above  * Additional Pertinent Lab Tests Reviewed: Betty 66 Admission Reviewed        Recent Cultures (last 7 days):           Last 24 Hours Medication List:     Current Facility-Administered Medications:  acetaminophen 650 mg Oral Q6H PRN Nataliia Crow MD   albuterol 2 puff Inhalation Q4H PRN Nataliia Crow MD   amLODIPine 5 mg Oral Daily Nataliia Crow MD   budesonide-formoterol 2 puff Inhalation BID Nataliia Crow MD   furosemide 40 mg Intravenous Daily Nataliia Crow MD   heparin (porcine) 5,000 Units Subcutaneous Q8H Jose Pisano MD   ipratropium 0 5 mg Nebulization TID Demetrio Soto MD   levalbuterol 1 25 mg Nebulization TID Demetrio Soto MD   lidocaine 1 patch Topical Daily Minda Brittle, MD   lisinopril 5 mg Oral Daily Nataliia Crow MD   nicotine 1 patch Transdermal Daily Nataliia Crow MD   traMADol 50 mg Oral Q6H PRN Nataliia Crow MD        Today, Patient Was Seen By: Shanon Baldwin MD    ** Please Note: Dictation voice to text software may have been used in the creation of this document   **

## 2020-01-13 NOTE — ED NOTES
1  CC-SOB and back pain  Swelling in legs bilaterally    2  Orientation status-A&OX4    3  Abnormal labs/ abnormal focused assessment/ abnormal vitals-elevated troponinx3  New onset rapid a  Fib 01/13@ about 1000  Pt was given 5mg IV metoprolol and 10mg IV cardizem  HR remains in the 130's  Dr Rita Johnson and Isidro Sevilla were made aware via tiger text    Awaiting additional orders  New diagnosis CHF and possible cancer (biopsy to be obtained)  Elevated ALT/AST, BUN, WBC, alkaline phosphatase  Low protein, albumin and calcium  4  Medications/drips-n/a    5  Last time narcotics given-n/a    6  IV lines/drains/etc-20G L AC    7  Isolation status-n/a    8  Skin-intact    9  Ambulation-independent    10  ED nurse's phone number-[phone]  [phone]   Admission related to traumatic injury-edwin Schaefer RN  01/13/20 7614

## 2020-01-13 NOTE — ASSESSMENT & PLAN NOTE
From marrow compression secondary to metastatic disease    Monitor closely for any compression syndrome

## 2020-01-13 NOTE — RESPIRATORY THERAPY NOTE
RT Protocol Note  Johnna Mitchell 76 y o  female MRN: [id number]  Unit/Bed#: ED 19 Encounter: [id number]    Assessment    Principal Problem:    Generalized edema  Active Problems:    Benign essential hypertension    Chronic obstructive pulmonary disease (HCC)    Cigarette nicotine dependence without complication    Numbness of fingers of both hands    Transaminitis    Leukocytosis      Home Pulmonary Medications:  inhalers       Past Medical History:   Diagnosis Date    Abnormal mammogram 1/7/2016    Chronic bronchitis (HCC)     Last Assessed:  3/23/16    Chronic pain of both knees 12/3/2018    Fibromyalgia     History of aneurysm 2004    Hypertension     Recurrent major depressive disorder, in partial [facility] [place] 12/20/2016     Social History     Socioeconomic History    Marital status: /Civil Union     Spouse name: None    Number of children: None    Years of education: None    Highest education level: None   Occupational History    Occupation: Maintenance   Social Needs    Financial resource strain: None    Food insecurity:     Worry: None     Inability: None    Transportation needs:     Medical: None     Non-medical: None   Tobacco Use    Smoking status: Current Every Day Smoker     Packs/day: 1 00     Years: 50 00     Pack years: 50 00     Types: Cigarettes    Smokeless tobacco: Never Used    Tobacco comment: Motivated to quit   Substance and Sexual Activity    Alcohol use: Not Currently    Drug use: No    Sexual activity: Never     Partners: Male   Lifestyle    Physical activity:     Days per week: 0 days     Minutes per session: 0 min    Stress:  To some extent   Relationships    Social connections:     Talks on phone: None     Gets together: None     Attends Gnosticist service: None     Active member of club or organization: None     Attends meetings of clubs or organizations: None     Relationship status: None    Intimate partner violence:     Fear of current or ex partner: None Emotionally abused: None     Physically abused: None     Forced sexual activity: None   Other Topics Concern    None   Social History Narrative    None       Subjective    Subjective Data: awake and alert without apparent respiratory distress     Objective    Physical Exam:   Assessment Type: Assess only  General Appearance: Alert, Awake  Respiratory Pattern: Normal, Spontaneous, Dyspnea with exertion  Chest Assessment: Chest expansion symmetrical  Bilateral Breath Sounds: Diminished, Scattered, Coarse  Cough: Non-productive, Congested, Strong  O2 Device: nasal cannula     Vitals:  Blood pressure 145/65, pulse 84, temperature 97 8 °F (36 6 °C), resp  rate 22, height 4' 11" (1 499 m), weight 62 8 kg (138 lb 7 2 oz), SpO2 97 %  Imaging and other studies: I have personally reviewed pertinent reports        O2 Device: nasal cannula      Plan    Respiratory Plan: Home Bronchodilator Patient pathway        Resp Comments: respiratory protocol completed at this time patient has significant pulmonary PMH and does inhaler treatments at home which she states was not helping with chest congestion nebulizers will be ordered TID with PRN overnight ion attemept ti improve pulmonary status and mobilize retained secretions

## 2020-01-14 ENCOUNTER — APPOINTMENT (INPATIENT)
Dept: NON INVASIVE DIAGNOSTICS | Facility: HOSPITAL | Age: 75
DRG: 291 | End: 2020-01-14
Payer: COMMERCIAL

## 2020-01-14 ENCOUNTER — APPOINTMENT (INPATIENT)
Dept: CT IMAGING | Facility: HOSPITAL | Age: 75
DRG: 291 | End: 2020-01-14
Payer: COMMERCIAL

## 2020-01-14 ENCOUNTER — APPOINTMENT (INPATIENT)
Dept: INTERVENTIONAL RADIOLOGY/VASCULAR | Facility: HOSPITAL | Age: 75
DRG: 291 | End: 2020-01-14
Attending: FAMILY MEDICINE
Payer: COMMERCIAL

## 2020-01-14 PROBLEM — I48.91 ATRIAL FIBRILLATION WITH RAPID VENTRICULAR RESPONSE (HCC): Status: ACTIVE | Noted: 2020-01-14

## 2020-01-14 PROBLEM — R79.89 ELEVATED BRAIN NATRIURETIC PEPTIDE (BNP) LEVEL: Status: ACTIVE | Noted: 2020-01-12

## 2020-01-14 PROBLEM — R60.1 GENERALIZED EDEMA: Status: ACTIVE | Noted: 2020-01-14

## 2020-01-14 LAB
ALBUMIN SERPL BCP-MCNC: 2.4 G/DL (ref 3.5–5)
ALP SERPL-CCNC: 211 U/L (ref 46–116)
ALT SERPL W P-5'-P-CCNC: 181 U/L (ref 12–78)
ANION GAP SERPL CALCULATED.3IONS-SCNC: 8 MMOL/L (ref 4–13)
APTT PPP: 26 SECONDS (ref 23–37)
APTT PPP: 35 SECONDS (ref 23–37)
APTT PPP: 52 SECONDS (ref 23–37)
AST SERPL W P-5'-P-CCNC: 225 U/L (ref 5–45)
BILIRUB SERPL-MCNC: 0.9 MG/DL (ref 0.2–1)
BUN SERPL-MCNC: 26 MG/DL (ref 5–25)
CALCIUM SERPL-MCNC: 7.8 MG/DL (ref 8.3–10.1)
CHLORIDE SERPL-SCNC: 99 MMOL/L (ref 100–108)
CO2 SERPL-SCNC: 32 MMOL/L (ref 21–32)
CREAT SERPL-MCNC: 0.84 MG/DL (ref 0.6–1.3)
ERYTHROCYTE [DISTWIDTH] IN BLOOD BY AUTOMATED COUNT: 13.6 % (ref 11.6–15.1)
GFR SERPL CREATININE-BSD FRML MDRD: 69 ML/MIN/1.73SQ M
GLUCOSE SERPL-MCNC: 130 MG/DL (ref 65–140)
HCT VFR BLD AUTO: 36.6 % (ref 34.8–46.1)
HGB BLD-MCNC: 11.9 G/DL (ref 11.5–15.4)
MAGNESIUM SERPL-MCNC: 2.3 MG/DL (ref 1.6–2.6)
MCH RBC QN AUTO: 32.7 PG (ref 26.8–34.3)
MCHC RBC AUTO-ENTMCNC: 32.5 G/DL (ref 31.4–37.4)
MCV RBC AUTO: 101 FL (ref 82–98)
PLATELET # BLD AUTO: 175 THOUSANDS/UL (ref 149–390)
PMV BLD AUTO: 11.1 FL (ref 8.9–12.7)
POTASSIUM SERPL-SCNC: 3.6 MMOL/L (ref 3.5–5.3)
PROT SERPL-MCNC: 5.9 G/DL (ref 6.4–8.2)
RBC # BLD AUTO: 3.64 MILLION/UL (ref 3.81–5.12)
SODIUM SERPL-SCNC: 139 MMOL/L (ref 136–145)
WBC # BLD AUTO: 14.54 THOUSAND/UL (ref 4.31–10.16)

## 2020-01-14 PROCEDURE — 94760 N-INVAS EAR/PLS OXIMETRY 1: CPT

## 2020-01-14 PROCEDURE — 83735 ASSAY OF MAGNESIUM: CPT | Performed by: NURSE PRACTITIONER

## 2020-01-14 PROCEDURE — 99024 POSTOP FOLLOW-UP VISIT: CPT | Performed by: RADIOLOGY

## 2020-01-14 PROCEDURE — 88341 IMHCHEM/IMCYTCHM EA ADD ANTB: CPT | Performed by: PATHOLOGY

## 2020-01-14 PROCEDURE — 88342 IMHCHEM/IMCYTCHM 1ST ANTB: CPT | Performed by: PATHOLOGY

## 2020-01-14 PROCEDURE — 47000 NEEDLE BIOPSY OF LIVER PERQ: CPT | Performed by: RADIOLOGY

## 2020-01-14 PROCEDURE — 88185 FLOWCYTOMETRY/TC ADD-ON: CPT

## 2020-01-14 PROCEDURE — 85027 COMPLETE CBC AUTOMATED: CPT | Performed by: FAMILY MEDICINE

## 2020-01-14 PROCEDURE — 99231 SBSQ HOSP IP/OBS SF/LOW 25: CPT | Performed by: INTERNAL MEDICINE

## 2020-01-14 PROCEDURE — 99152 MOD SED SAME PHYS/QHP 5/>YRS: CPT | Performed by: RADIOLOGY

## 2020-01-14 PROCEDURE — 80053 COMPREHEN METABOLIC PANEL: CPT | Performed by: FAMILY MEDICINE

## 2020-01-14 PROCEDURE — 88307 TISSUE EXAM BY PATHOLOGIST: CPT | Performed by: PATHOLOGY

## 2020-01-14 PROCEDURE — 99233 SBSQ HOSP IP/OBS HIGH 50: CPT | Performed by: INTERNAL MEDICINE

## 2020-01-14 PROCEDURE — 88333 PATH CONSLTJ SURG CYTO XM 1: CPT | Performed by: PATHOLOGY

## 2020-01-14 PROCEDURE — 88184 FLOWCYTOMETRY/ TC 1 MARKER: CPT | Performed by: RADIOLOGY

## 2020-01-14 PROCEDURE — 93306 TTE W/DOPPLER COMPLETE: CPT | Performed by: INTERNAL MEDICINE

## 2020-01-14 PROCEDURE — 0FB13ZX EXCISION OF RIGHT LOBE LIVER, PERCUTANEOUS APPROACH, DIAGNOSTIC: ICD-10-PCS | Performed by: RADIOLOGY

## 2020-01-14 PROCEDURE — 71260 CT THORAX DX C+: CPT

## 2020-01-14 PROCEDURE — 88360 TUMOR IMMUNOHISTOCHEM/MANUAL: CPT | Performed by: PATHOLOGY

## 2020-01-14 PROCEDURE — 76942 ECHO GUIDE FOR BIOPSY: CPT

## 2020-01-14 PROCEDURE — 85730 THROMBOPLASTIN TIME PARTIAL: CPT | Performed by: INTERNAL MEDICINE

## 2020-01-14 PROCEDURE — 94640 AIRWAY INHALATION TREATMENT: CPT

## 2020-01-14 PROCEDURE — 99152 MOD SED SAME PHYS/QHP 5/>YRS: CPT

## 2020-01-14 PROCEDURE — 93306 TTE W/DOPPLER COMPLETE: CPT

## 2020-01-14 PROCEDURE — 76942 ECHO GUIDE FOR BIOPSY: CPT | Performed by: RADIOLOGY

## 2020-01-14 PROCEDURE — 47000 NEEDLE BIOPSY OF LIVER PERQ: CPT

## 2020-01-14 RX ORDER — POTASSIUM CHLORIDE 20 MEQ/1
40 TABLET, EXTENDED RELEASE ORAL DAILY
Status: COMPLETED | OUTPATIENT
Start: 2020-01-14 | End: 2020-01-14

## 2020-01-14 RX ORDER — FENTANYL CITRATE 50 UG/ML
INJECTION, SOLUTION INTRAMUSCULAR; INTRAVENOUS CODE/TRAUMA/SEDATION MEDICATION
Status: COMPLETED | OUTPATIENT
Start: 2020-01-14 | End: 2020-01-14

## 2020-01-14 RX ORDER — LIDOCAINE HYDROCHLORIDE 10 MG/ML
INJECTION, SOLUTION EPIDURAL; INFILTRATION; INTRACAUDAL; PERINEURAL CODE/TRAUMA/SEDATION MEDICATION
Status: COMPLETED | OUTPATIENT
Start: 2020-01-14 | End: 2020-01-14

## 2020-01-14 RX ORDER — MIDAZOLAM HYDROCHLORIDE 2 MG/2ML
INJECTION, SOLUTION INTRAMUSCULAR; INTRAVENOUS CODE/TRAUMA/SEDATION MEDICATION
Status: COMPLETED | OUTPATIENT
Start: 2020-01-14 | End: 2020-01-14

## 2020-01-14 RX ORDER — DIPHENHYDRAMINE HYDROCHLORIDE 50 MG/ML
INJECTION INTRAMUSCULAR; INTRAVENOUS CODE/TRAUMA/SEDATION MEDICATION
Status: COMPLETED | OUTPATIENT
Start: 2020-01-14 | End: 2020-01-14

## 2020-01-14 RX ORDER — DILTIAZEM HYDROCHLORIDE 5 MG/ML
10 INJECTION INTRAVENOUS ONCE
Status: COMPLETED | OUTPATIENT
Start: 2020-01-14 | End: 2020-01-14

## 2020-01-14 RX ADMIN — POTASSIUM CHLORIDE 40 MEQ: 1500 TABLET, EXTENDED RELEASE ORAL at 09:18

## 2020-01-14 RX ADMIN — HEPARIN SODIUM 3600 UNITS: 1000 INJECTION INTRAVENOUS; SUBCUTANEOUS at 23:31

## 2020-01-14 RX ADMIN — FENTANYL CITRATE 25 MCG: 50 INJECTION, SOLUTION INTRAMUSCULAR; INTRAVENOUS at 13:16

## 2020-01-14 RX ADMIN — MIDAZOLAM HYDROCHLORIDE 0.5 MG: 1 INJECTION, SOLUTION INTRAMUSCULAR; INTRAVENOUS at 13:13

## 2020-01-14 RX ADMIN — LIDOCAINE 1 PATCH: 50 PATCH TOPICAL at 09:18

## 2020-01-14 RX ADMIN — IPRATROPIUM BROMIDE 0.5 MG: 0.5 SOLUTION RESPIRATORY (INHALATION) at 08:33

## 2020-01-14 RX ADMIN — FUROSEMIDE 40 MG: 10 INJECTION, SOLUTION INTRAMUSCULAR; INTRAVENOUS at 09:18

## 2020-01-14 RX ADMIN — IPRATROPIUM BROMIDE 0.5 MG: 0.5 SOLUTION RESPIRATORY (INHALATION) at 21:24

## 2020-01-14 RX ADMIN — DILTIAZEM HYDROCHLORIDE 30 MG: 30 TABLET, FILM COATED ORAL at 11:06

## 2020-01-14 RX ADMIN — LEVALBUTEROL HYDROCHLORIDE 1.25 MG: 1.25 SOLUTION, CONCENTRATE RESPIRATORY (INHALATION) at 14:49

## 2020-01-14 RX ADMIN — DILTIAZEM HYDROCHLORIDE 30 MG: 30 TABLET, FILM COATED ORAL at 17:02

## 2020-01-14 RX ADMIN — DILTIAZEM HYDROCHLORIDE 10 MG: 5 INJECTION INTRAVENOUS at 02:48

## 2020-01-14 RX ADMIN — LISINOPRIL 5 MG: 5 TABLET ORAL at 09:18

## 2020-01-14 RX ADMIN — TRAMADOL HYDROCHLORIDE 50 MG: 50 TABLET, FILM COATED ORAL at 22:58

## 2020-01-14 RX ADMIN — HEPARIN SODIUM AND DEXTROSE 12 UNITS/KG/HR: 10000; 5 INJECTION INTRAVENOUS at 21:35

## 2020-01-14 RX ADMIN — BUDESONIDE AND FORMOTEROL FUMARATE DIHYDRATE 2 PUFF: 160; 4.5 AEROSOL RESPIRATORY (INHALATION) at 11:00

## 2020-01-14 RX ADMIN — FENTANYL CITRATE 25 MCG: 50 INJECTION, SOLUTION INTRAMUSCULAR; INTRAVENOUS at 13:13

## 2020-01-14 RX ADMIN — LEVALBUTEROL HYDROCHLORIDE 1.25 MG: 1.25 SOLUTION, CONCENTRATE RESPIRATORY (INHALATION) at 08:33

## 2020-01-14 RX ADMIN — HEPARIN SODIUM 1800 UNITS: 1000 INJECTION INTRAVENOUS; SUBCUTANEOUS at 05:39

## 2020-01-14 RX ADMIN — LIDOCAINE HYDROCHLORIDE 10 ML: 10 INJECTION, SOLUTION EPIDURAL; INFILTRATION; INTRACAUDAL; PERINEURAL at 13:14

## 2020-01-14 RX ADMIN — DILTIAZEM HYDROCHLORIDE 30 MG: 30 TABLET, FILM COATED ORAL at 06:22

## 2020-01-14 RX ADMIN — DILTIAZEM HYDROCHLORIDE 30 MG: 30 TABLET, FILM COATED ORAL at 00:04

## 2020-01-14 RX ADMIN — FENTANYL CITRATE 25 MCG: 50 INJECTION, SOLUTION INTRAMUSCULAR; INTRAVENOUS at 13:26

## 2020-01-14 RX ADMIN — IPRATROPIUM BROMIDE 0.5 MG: 0.5 SOLUTION RESPIRATORY (INHALATION) at 14:49

## 2020-01-14 RX ADMIN — BUDESONIDE AND FORMOTEROL FUMARATE DIHYDRATE 2 PUFF: 160; 4.5 AEROSOL RESPIRATORY (INHALATION) at 17:02

## 2020-01-14 RX ADMIN — IOHEXOL 85 ML: 350 INJECTION, SOLUTION INTRAVENOUS at 12:59

## 2020-01-14 RX ADMIN — DILTIAZEM HYDROCHLORIDE 30 MG: 30 TABLET, FILM COATED ORAL at 23:00

## 2020-01-14 RX ADMIN — MIDAZOLAM HYDROCHLORIDE 0.5 MG: 1 INJECTION, SOLUTION INTRAMUSCULAR; INTRAVENOUS at 13:20

## 2020-01-14 RX ADMIN — DIPHENHYDRAMINE HYDROCHLORIDE 25 MG: 50 INJECTION, SOLUTION INTRAMUSCULAR; INTRAVENOUS at 13:14

## 2020-01-14 RX ADMIN — DIPHENHYDRAMINE HYDROCHLORIDE 25 MG: 50 INJECTION, SOLUTION INTRAMUSCULAR; INTRAVENOUS at 13:16

## 2020-01-14 RX ADMIN — LEVALBUTEROL HYDROCHLORIDE 1.25 MG: 1.25 SOLUTION, CONCENTRATE RESPIRATORY (INHALATION) at 21:23

## 2020-01-14 RX ADMIN — NICOTINE 1 PATCH: 14 PATCH TRANSDERMAL at 09:18

## 2020-01-14 NOTE — PROGRESS NOTES
Progress Note - Macy Chandan 1945, 76 y o  female MRN: [id number]    Unit/Bed#:  Encounter: [id number]    Primary Care Provider: Raphael Neal MD   Date and time admitted to hospital: 1/12/2020  4:52 PM        Generalized edema  Assessment & Plan  Multifactorial likely secondary to CHF, likely widely metastatic disease and low albumin  Continue diuresis with IV Lasix  Workup in process    Transaminitis  Assessment & Plan  Likely secondary to liver metastases  Patient with numerous liver masses, left lower lobe pulmonary consultation or mass, and Several left anterior subcutaneous soft tissue nodules likely metastases  IR consult for biopsy  Oncology is following  Follow on chest CT scan        * Atrial fibrillation with rapid ventricular response (HCC)  Assessment & Plan  Cardiac echo with EF 40% with diffuse hypokinesis and moderate AS  Currently HR is controlled  Cardiology is following  Continue with heparin drip and diltiazem    Numbness of fingers of both hands  Assessment & Plan  Secondary to cervical cord compression with myelopathy    Monitor closely for any compression syndrome  Outpatient neurosurgery follow-up    Chronic obstructive pulmonary disease ([facility] [address] )  Assessment & Plan  With active tobacco smoking   Currently not in exacerbation  Continue p r n  Nebs  Continue Symbicort  Continue nicotine patch    Benign essential hypertension  Assessment & Plan  Continue to monitor  Currently on Cardizem and lisinopril      VTE Pharmacologic Prophylaxis:   Pharmacologic: Heparin Drip  Mechanical VTE Prophylaxis in Place: Yes    Patient Centered Rounds: I have performed bedside rounds with nursing staff today  Discussions with Specialists or Other Care Team Provider:     Education and Discussions with Family / Patient:  Discussed with patient's daughter at bedside    Time Spent for Care: 30 minutes    More than 50% of total time spent on counseling and coordination of care as described above     Current Length of Stay: 2 day(s)    Current Patient Status: Inpatient   Certification Statement: The patient will continue to require additional inpatient hospital stay due to Above    Discharge Plan / Estimated Discharge Date: TBD    Code Status: Level 1 - Full Code      Subjective:   Patient seen and examined  Comfortable in bed  Last night event reviewed  Currently heart rate is controlled without Cardizem drip  No chest pain  Daughter at bedside    Objective:     Vitals:   Temp (24hrs), Av 1 °F (36 7 °C), Min:97 8 °F (36 6 °C), Max:98 4 °F (36 9 °C)    Temp:  [97 8 °F (36 6 °C)-98 4 °F (36 9 °C)] 98 1 °F (36 7 °C)  HR:  [] 72  Resp:  [17-23] 23  BP: ()/(58-77) 123/59  SpO2:  [89 %-97 %] 92 %  Body mass index is 27 96 kg/m²  Input and Output Summary (last 24 hours): Intake/Output Summary (Last 24 hours) at 2020 1216  Last data filed at 2020 1059  Gross per 24 hour   Intake 620 16 ml   Output 725 ml   Net -104 84 ml       Physical Exam:     Physical Exam  Patient is awake alert in no acute distress  Lung with decreased breath sounds bilateral  Heart positive S1-S2 Irregular  Abdomen soft nontender  Bilateral lower extremity edema    Additional Data:     Labs:    Results from last 7 days   Lab Units 20  0430  20  0645   WBC Thousand/uL 14 54*   < > 14 10*   HEMOGLOBIN g/dL 11 9   < > 11 9   HEMATOCRIT % 36 6   < > 37 2   PLATELETS Thousands/uL 175   < > 167   NEUTROS PCT %  --   --  90*   LYMPHS PCT %  --   --  5*   MONOS PCT %  --   --  4   EOS PCT %  --   --  0    < > = values in this interval not displayed  Results from last 7 days   Lab Units 20  0431   POTASSIUM mmol/L 3 6   CHLORIDE mmol/L 99*   CO2 mmol/L 32   BUN mg/dL 26*   CREATININE mg/dL 0 84   CALCIUM mg/dL 7 8*   ALK PHOS U/L 211*   ALT U/L 181*   AST U/L 225*     Results from last 7 days   Lab Units 20  1539   INR  1 11       * I Have Reviewed All Lab Data Listed Above    * Additional Pertinent Lab Tests Reviewed: [place] [facility] Admission Reviewed    Imaging:    Imaging Reports Reviewed Today Include:   Imaging Personally Reviewed by Myself Includes:      Recent Cultures (last 7 days):           Last 24 Hours Medication List:     Current Facility-Administered Medications:  acetaminophen 650 mg Oral Q6H PRN [place], PA-C    albuterol 2 puff Inhalation Q4H PRN Mary [place], PA-C    budesonide-formoterol 2 puff Inhalation BID [place] [employer], PA-C    diltiazem 30 mg Oral Q6H [facility] Mary [place] [employer], PA-C    furosemide 40 mg Intravenous Daily [place], PA-C    heparin (porcine) 3-20 Units/kg/hr (Order-Specific) Intravenous Titrated Mary [place], PA-C Last Rate: Stopped (01/14/20 1101)   heparin (porcine) 1,800 Units Intravenous PRN Mary [place], PA-C    heparin (porcine) 3,600 Units Intravenous PRN Mary [place], PA-C    ipratropium 0 5 mg Nebulization TID [place], PA-C    levalbuterol 1 25 mg Nebulization TID [place], PA-C    lidocaine 1 patch Topical Daily Mary [place] [employer], PA-C    lisinopril 5 mg Oral Daily Mary [place], PA-C    nicotine 1 patch Transdermal Daily [place], PA-C    traMADol 50 mg Oral Q6H PRN [place], PA-C         Today, Patient Was Seen By: Pelon Villavicencio DO    ** Please Note: This note has been constructed using a voice recognition system   **

## 2020-01-14 NOTE — ASSESSMENT & PLAN NOTE
Multifactorial likely secondary to CHF, likely widely metastatic disease and low albumin  Continue diuresis with IV Lasix  Workup in process

## 2020-01-14 NOTE — BRIEF OP NOTE (RAD/CATH)
IR IMAGE GUIDED BIOPSY/ASPIRATION LIVER Procedure Note    PATIENT NAME: Arti Castellanos  : 1945  MRN: [id number]    Pre-op Diagnosis:   1  Congestive heart failure (CHF) ([facility] [address] )    2  Metastases to the liver ([facility] [address] )    3  Tachycardia      Post-op Diagnosis:   1  Congestive heart failure (CHF) ([facility] [address] )    2  Metastases to the liver ([facility]    3  Tachycardia        Surgeon:   Abilio Guevara MD    Estimated Blood Loss: 1 mL    Findings: Successful right liver biopsy  Specimens: 5 core needle samples (4 into formalin, 1 into RPMI)      Complications:  None    Anesthesia: Conscious sedation and Local    Abilio Guevara MD     Date: 2020  Time: 1:30 PM

## 2020-01-14 NOTE — ASSESSMENT & PLAN NOTE
With active tobacco smoking   Currently not in exacerbation  Continue p r n  Nebs  Continue Symbicort    Continue nicotine patch

## 2020-01-14 NOTE — SOCIAL WORK
L[facility] 80  Not readmission  Cm met w pt and daughter  Pt has no POA or advanced directive, cm provided her w resources  Pt resides w her  and grandchildren  Daughter lives locally  2 story home  4 steps to enter, flight to bedroom on 2nd floor  Pt uses cane  Pt requires assist w adls  Daughter and grandchildren help  Uses [facility]  [facility]  Reports no issues obtaining these medications  No VNA hx  Has ride home at d c no cm will request PT eval    CM reviewed discharge planning process including the following: identifying help at home, patient preference for discharge planning needs, pharmacy preference, and availability of treatment team to discuss questions or concerns patient and/or family may have regarding understanding medications and recognizing signs and symptoms once discharged  CM also encouraged patient to follow up with all recommended appointments after discharge  Patient advised of importance for patient and family to participate in managing patients medical well being  CM name and role reviewed  Discharge Checklist reviewed and CM will continue to monitor for progress toward discharge goals in nursing and provider rounds

## 2020-01-14 NOTE — PLAN OF CARE
Problem: Prexisting or High Potential for Compromised Skin Integrity  Goal: Skin integrity is maintained or improved  Description  INTERVENTIONS:  - Identify patients at risk for skin breakdown  - Assess and monitor skin integrity  - Assess and monitor nutrition and hydration status  - Monitor labs   - Assess for incontinence   - Turn and reposition patient  - Assist with mobility/ambulation  - Relieve pressure over bony prominences  - Avoid friction and shearing  - Provide appropriate hygiene as needed including keeping skin clean and dry  - Evaluate need for skin moisturizer/barrier cream  - Collaborate with interdisciplinary team   - Patient/family teaching  - Consider wound care consult   Outcome: Progressing     Problem: Potential for Falls  Goal: Patient will remain free of falls  Description  INTERVENTIONS:  - Assess patient frequently for physical needs  -  Identify cognitive and physical deficits and behaviors that affect risk of falls    -  [place] fall precautions as indicated by assessment   - Educate patient/family on patient safety including physical limitations  - Instruct patient to call for assistance with activity based on assessment  - Modify environment to reduce risk of injury  - Consider OT/PT consult to assist with strengthening/mobility  Outcome: Progressing     Problem: CARDIOVASCULAR - ADULT  Goal: Maintains optimal cardiac output and hemodynamic stability  Description  INTERVENTIONS:  - Monitor I/O, vital signs and rhythm  - Monitor for S/S and trends of decreased cardiac output  - Administer and titrate ordered vasoactive medications to optimize hemodynamic stability  - Assess quality of pulses, skin color and temperature  - Assess for signs of decreased coronary artery perfusion  - Instruct patient to report change in severity of symptoms  Outcome: Progressing  Goal: Absence of cardiac dysrhythmias or at baseline rhythm  Description  INTERVENTIONS:  - Continuous cardiac monitoring, vital signs, obtain 12 lead EKG if ordered  - Administer antiarrhythmic and heart rate control medications as ordered  - Monitor electrolytes and administer replacement therapy as ordered  Outcome: Progressing     Problem: PAIN - ADULT  Goal: Verbalizes/displays adequate comfort level or baseline comfort level  Description  Interventions:  - Encourage patient to monitor pain and request assistance  - Assess pain using appropriate pain scale  - Administer analgesics based on type and severity of pain and evaluate response  - Implement non-pharmacological measures as appropriate and evaluate response  - Consider cultural and social influences on pain and pain management  - Notify physician/advanced practitioner if interventions unsuccessful or patient reports new pain  Outcome: Progressing     Problem: INFECTION - ADULT  Goal: Absence or prevention of progression during hospitalization  Description  INTERVENTIONS:  - Assess and monitor for signs and symptoms of infection  - Monitor lab/diagnostic results  - Monitor all insertion sites, i e  indwelling lines, tubes, and drains  - Monitor endotracheal if appropriate and nasal secretions for changes in amount and color  - [place] appropriate cooling/warming therapies per order  - Administer medications as ordered  - Instruct and encourage patient and family to use good hand hygiene technique  - Identify and instruct in appropriate isolation precautions for identified infection/condition  Outcome: Progressing     Problem: SAFETY ADULT  Goal: Patient will remain free of falls  Description  INTERVENTIONS:  - Assess patient frequently for physical needs  -  Identify cognitive and physical deficits and behaviors that affect risk of falls    -  [place] fall precautions as indicated by assessment   - Educate patient/family on patient safety including physical limitations  - Instruct patient to call for assistance with activity based on assessment  - Modify environment to reduce risk of injury  - Consider OT/PT consult to assist with strengthening/mobility  Outcome: Progressing  Goal: Maintain or return to baseline ADL function  Description  INTERVENTIONS:  -  Assess patient's ability to carry out ADLs; assess patient's baseline for ADL function and identify physical deficits which impact ability to perform ADLs (bathing, care of mouth/teeth, toileting, grooming, dressing, etc )  - Assess/evaluate cause of self-care deficits   - Assess range of motion  - Assess patient's mobility; develop plan if impaired  - Assess patient's need for assistive devices and provide as appropriate  - Encourage maximum independence but intervene and supervise when necessary  - Involve family in performance of ADLs  - Assess for home care needs following discharge   - Consider OT consult to assist with ADL evaluation and planning for discharge  - Provide patient education as appropriate  Outcome: Progressing  Goal: Maintain or return mobility status to optimal level  Description  INTERVENTIONS:  - Assess patient's baseline mobility status (ambulation, transfers, stairs, etc )    - Identify cognitive and physical deficits and behaviors that affect mobility  - Identify mobility aids required to assist with transfers and/or ambulation (gait belt, sit-to-stand, lift, walker, cane, etc )  - [place] fall precautions as indicated by assessment  - Record patient progress and toleration of activity level on Mobility SBAR; progress patient to next Phase/Stage  - Instruct patient to call for assistance with activity based on assessment  - Consider rehabilitation consult to assist with strengthening/weightbearing, etc   Outcome: Progressing     Problem: DISCHARGE PLANNING  Goal: Discharge to home or other facility with appropriate resources  Description  INTERVENTIONS:  - Identify barriers to discharge w/patient and caregiver  - Arrange for needed discharge resources and transportation as appropriate  - Identify discharge learning needs (meds, wound care, etc )  - Arrange for interpretive services to assist at discharge as needed  - Refer to Case Management Department for coordinating discharge planning if the patient needs post-hospital services based on physician/advanced practitioner order or complex needs related to functional status, cognitive ability, or social support system  Outcome: Progressing     Problem: Knowledge Deficit  Goal: Patient/family/caregiver demonstrates understanding of disease process, treatment plan, medications, and discharge instructions  Description  Complete learning assessment and assess knowledge base    Interventions:  - Provide teaching at level of understanding  - Provide teaching via preferred learning methods  Outcome: Progressing     Problem: RESPIRATORY - ADULT  Goal: Achieves optimal ventilation and oxygenation  Description  INTERVENTIONS:  - Assess for changes in respiratory status  - Assess for changes in mentation and behavior  - Position to facilitate oxygenation and minimize respiratory effort  - Oxygen administered by appropriate delivery if ordered  - Initiate smoking cessation education as indicated  - Encourage broncho-pulmonary hygiene including cough, deep breathe, Incentive Spirometry  - Assess the need for suctioning and aspirate as needed  - Assess and instruct to report SOB or any respiratory difficulty  - Respiratory Therapy support as indicated  Outcome: Progressing

## 2020-01-14 NOTE — ASSESSMENT & PLAN NOTE
Plan for liver biopsy today  Most likely related to significant metastatic burden  Given prsence of cholelithiasis with elevated white count, could consider intra-abdominal infection

## 2020-01-14 NOTE — ASSESSMENT & PLAN NOTE
Cardiac echo with EF 40% with diffuse hypokinesis and moderate AS  Currently HR is controlled  Cardiology is following  Continue with heparin drip and diltiazem

## 2020-01-14 NOTE — ASSESSMENT & PLAN NOTE
Secondary to cervical cord compression with myelopathy    Monitor closely for any compression syndrome  Outpatient neurosurgery follow-up

## 2020-01-14 NOTE — PROGRESS NOTES
Cardiology Progress Note    Assessment/Plan   Elevated troponin of unclear significance  Atrial fibrillation  Likely widely metastatic disease, innumerable hepatic lesions, bone marrow involvement  Heart failure, undetermined type  COPD  Tobacco abuse  Hx of cerebral aneurysm      Atrial fibrillation is currently rate controlled  No role for immediate DCCV  Continue heparin gtt  Continue IV diuresis at this time for her HF  Monitor renal function  Keep K > 4, Mg > 2, Ca > 8 and PO4 > 2 5  Will follow  LOS: 2 days     Subjective   RUQ US shows innumerable hepatic lesions  She feels well  Her breathing is much improved  IR guided biopsy planned for today  Objective     Vital signs in last 24 hours:  Temp:  [97 8 °F (36 6 °C)-98 4 °F (36 9 °C)] 97 9 °F (36 6 °C)  HR:  [] 63  Resp:  [17-24] 20  BP: ()/(58-77) 144/64      Intake/Output Summary (Last 24 hours) at 1/14/2020 1022  Last data filed at 1/14/2020 0700  Gross per 24 hour   Intake 598 56 ml   Output 125 ml   Net 473 56 ml     Weight (last 2 days)     Date/Time   Weight    01/12/20 1831   62 8 (138 45)                  Physical Exam:  General: AAOx3, NAD  HEENT: Normocephalic/Atraumatic, No thyromegaly, lymphadenopathy, JVD or carotid bruits  Cardiovasc: Irregularly irregular rhythm with a controlled rate  No murmurs, rubs or gallops  Radial, carotid pulses are 2+/4  Chest/Pulm: CTAB, no wheezes, rales or rhonchi  Abdomen: +BSx4, Soft, non-tender  No rebound, rigidity or guarding  There is some RUQ fullness  Extremities: 2+ pitting edema            Scheduled Meds:  Current Facility-Administered Medications:  acetaminophen 650 mg Oral Q6H PRN MALCOLM De La Rosa-VICKIE    albuterol 2 puff Inhalation Q4H PRN MALCOLM De La Rosa-VICKIE    budesonide-formoterol 2 puff Inhalation BID MALCOLM De La Rosa-VICKIE    diltiazem 1-15 mg/hr Intravenous Titrated Odell Sparrow PA-C Last Rate: Stopped (01/14/20 0411)   diltiazem 30 mg Oral Q6H Aury Cho Seagreaves, PA-C    furosemide 40 mg Intravenous Daily Murray Hurst, PA-C    heparin (porcine) 3-20 Units/kg/hr (Order-Specific) Intravenous Titrated Murray Hurst, PA-C Last Rate: 18 Units/kg/hr (01/14/20 0540)   heparin (porcine) 1,800 Units Intravenous PRN Murray Hurst, PA-C    heparin (porcine) 3,600 Units Intravenous PRN Murray Hurst, PA-C    ipratropium 0 5 mg Nebulization TID Murray Hurst, PA-C    levalbuterol 1 25 mg Nebulization TID Murray Hurst, PA-C    lidocaine 1 patch Topical Daily Murray Hurst, PA-C    lisinopril 5 mg Oral Daily Murray Hurst, PA-C    nicotine 1 patch Transdermal Daily Murray Hurst, PA-C    traMADol 50 mg Oral Q6H PRN Murray Hurst, PA-C      Continuous Infusions:  diltiazem 1-15 mg/hr Last Rate: Stopped (01/14/20 0411)   heparin (porcine) 3-20 Units/kg/hr (Order-Specific) Last Rate: 18 Units/kg/hr (01/14/20 0540)     PRN Meds:   acetaminophen    albuterol    heparin (porcine)    heparin (porcine)    traMADol      Cardiographics  TTE pending  Imaging  Abdominal US, 1/13/2020  IMPRESSION:  1  Enlarged liver with the parenchyma essentially replaced with innumerable heterogeneous focal lesions, as demonstrated on the recent CT scan, concerning for metastatic disease      2  Cholelithiasis without evidence of acute cholecystitis      3  Nonobstructing 3 mm calculus in the midportion of the right kidney        Lab Review   Results for Gabrielle Ferrera (MRN [id number]) as of 1/14/2020 10:19   1/14/2020 04:31   Sodium 139   Potassium 3 6   Chloride 99 (L)   CO2 32   Anion Gap 8   BUN 26 (H)   Creatinine 0 84   Glucose, Random 130   Calcium 7 8 (L)    (H)    (H)   Alkaline Phosphatase 211 (H)   Total Protein 5 9 (L)   Albumin 2 4 (L)   TOTAL BILIRUBIN 0 90   eGFR 69   Magnesium 2 3     Results for Gabrielle Ferrera (MRN [id number]) as of 1/14/2020 10:19   1/14/2020 04:30   WBC 14 54 (H)   Red Blood Cell Count 3 64 (L)   Hemoglobin 11 9   HCT 36 6   MCV 101 (H)   MCH 32 7   MCHC 32 5   RDW 13 6   Platelet Count 761

## 2020-01-14 NOTE — ASSESSMENT & PLAN NOTE
Patient started on Cardizem infusion after inability to control rated with oral Cardizem and bolus dosing  Presently avoiding beta-blockers secondary to respiratory status  Continue heparin infusion for now  Will increase oral Cardizem dose and hold home lisinopril  If blood pressure lowers with Cardizem, will load with digoxin  Appreciate cardiology recommendations

## 2020-01-14 NOTE — ASSESSMENT & PLAN NOTE
Likely secondary to liver metastases  Patient with numerous liver masses, left lower lobe pulmonary consultation or mass, and Several left anterior subcutaneous soft tissue nodules likely metastases  IR consult for biopsy  Oncology is following  Follow on chest CT scan

## 2020-01-14 NOTE — ASSESSMENT & PLAN NOTE
No fevers at present, could be related to metastatic disease  If patient kaleb fevers, would send full infectious work-up and begin empiric antibiotics

## 2020-01-14 NOTE — DISCHARGE INSTRUCTIONS
Percutaneous Liver Biopsy   WHAT YOU NEED TO KNOW:   A PLB is a procedure to remove a sample of tissue from your liver  The sample can be sent to a lab and tested for liver disease, cancer, or infection  After the procedure you may have pain and bruising at the biopsy site  You may also have pain in your right shoulder  These symptoms should get better in 48 to 72 hours  DISCHARGE INSTRUCTIONS:     [address] and [place] patients,  Contact Interventional Radiology at [phone] PATIENTS: Contact Interventional Radiology at [phone]   [facility] PATIENTS: Contact Interventional Radiology at [phone] if:    · Fever greater than 101 or chills  · You have severe pain in your abdomen  · Your abdomen is larger than usual and feels hard  · Your neck is more swollen and you have trouble swallowing  · You feel weak or dizzy  · Your heart is beating faster than usual    · Your pain does not get better after you take pain medicine  · Your wound is red, swollen, or draining pus  · You have nausea or are vomiting  · Your skin is itchy, swollen, or you have a rash  · You have questions or concerns about your condition or care  Medicines:   · Acetaminophen decreases pain and fever  It is available without a doctor's order  Acetaminophen can cause liver damage if not taken correctly  · Take your home medicine as directed  · Resume your normal diet  Small sips of flat soda will help with mild nausea  Self-care:   · Rest as directed  Do not play sports, exercise, or lift anything heavier than 5 pounds for up to 1 week  · Apply firm, steady pressure if bleeding occurs  A small amount of bleeding from your wound is possible  Apply pressure with a clean gauze or towel for 5 to 10 minutes  Call 911 if bleeding becomes heavy or does not stop  · Ask your healthcare provider when to take your blood thinner or antiplatelet medicine   You may need to wait 24 to 72 hours to take your medicine  This will prevent bleeding  Follow up with your healthcare provider as directed: Write down your questions so you remember to ask them during your visits  Percutaneous Liver Biopsy   WHAT YOU NEED TO KNOW:   A PLB is a procedure to remove a sample of tissue from your liver  The sample can be sent to a lab and tested for liver disease, cancer, or infection  After the procedure you may have pain and bruising at the biopsy site  You may also have pain in your right shoulder  These symptoms should get better in 48 to 72 hours  DISCHARGE INSTRUCTIONS:     [address] and Roxanne Recinos patients,  Contact Interventional Radiology at [phone] PATIENTS: Contact Interventional Radiology at [phone]   [facility] PATIENTS: Contact Interventional Radiology at [phone] if:    · Fever greater than 101 or chills  · You have severe pain in your abdomen  · Your abdomen is larger than usual and feels hard  · Your neck is more swollen and you have trouble swallowing  · You feel weak or dizzy  · Your heart is beating faster than usual    · Your pain does not get better after you take pain medicine  · Your wound is red, swollen, or draining pus  · You have nausea or are vomiting  · Your skin is itchy, swollen, or you have a rash  · You have questions or concerns about your condition or care  Medicines:   · Acetaminophen decreases pain and fever  It is available without a doctor's order  Acetaminophen can cause liver damage if not taken correctly  · Take your home medicine as directed  · Resume your normal diet  Small sips of flat soda will help with mild nausea  Self-care:   · Rest as directed  Do not play sports, exercise, or lift anything heavier than 5 pounds for up to 1 week  · Apply firm, steady pressure if bleeding occurs  A small amount of bleeding from your wound is possible   Apply pressure with a clean gauze or towel for 5 to 10 minutes  Call 911 if bleeding becomes heavy or does not stop  · Ask your healthcare provider when to take your blood thinner or antiplatelet medicine  You may need to wait 24 to 72 hours to take your medicine  This will prevent bleeding  Follow up with your healthcare provider as directed: Write down your questions so you remember to ask them during your visits

## 2020-01-14 NOTE — CONSULTS
Interventional Radiology  Consultation 1/14/2020     History of Present Illness:  76year old female with multiple liver lesions concerning for metastatic disease is referred for liver biopsy      Past Medical History:   Diagnosis Date    Abnormal mammogram 1/7/2016    Chronic bronchitis (HCC)     Last Assessed:  3/23/16    Chronic pain of both knees 12/3/2018    Fibromyalgia     History of aneurysm 2004    Hypertension     Recurrent major depressive disorder, in partial remission ([facility] [address] ) 12/20/2016        Past Surgical History:   Procedure Laterality Date    CEREBRAL ANEURYSM REPAIR  2004    COLONOSCOPY          Social History     Tobacco Use   Smoking Status Current Every Day Smoker    Packs/day: 1 00    Years: 50 00    Pack years: 50 00    Types: Cigarettes   Smokeless Tobacco Never Used   Tobacco Comment    Motivated to quit        Social History     Substance and Sexual Activity   Alcohol Use Never    Frequency: Never    Binge frequency: Never        Social History     Substance and Sexual Activity   Drug Use No        No Known Allergies    Current Facility-Administered Medications   Medication Dose Route Frequency Provider Last Rate Last Dose    acetaminophen (TYLENOL) tablet 650 mg  650 mg Oral Q6H PRN Melissa Freeman PA-C        albuterol (PROVENTIL HFA,VENTOLIN HFA) inhaler 2 puff  2 puff Inhalation Q4H PRN Melissa Freeman PA-C        budesonide-formoterol (SYMBICORT) 160-4 5 mcg/act inhaler 2 puff  2 puff Inhalation BID Melissa Freeman PA-C   2 puff at 01/14/20 1100    diltiazem (CARDIZEM) tablet 30 mg  30 mg Oral Q6H [address] Melody Mitchell PA-C   30 mg at 01/14/20 1106    furosemide (LASIX) injection 40 mg  40 mg Intravenous Daily Melissa Freeman PA-C   40 mg at 01/14/20 0918    heparin (porcine) 25,000 units in 250 mL infusion (premix)  3-20 Units/kg/hr (Order-Specific) Intravenous Titrated Melissa Freeman PA-C   Stopped at 01/14/20 1101    heparin (porcine) injection 1,800 Units 1,800 Units Intravenous PRN David Wells PA-C   1,800 Units at 01/14/20 0539    heparin (porcine) injection 3,600 Units  3,600 Units Intravenous PRN David Wells PA-C   3,600 Units at 01/13/20 2304    ipratropium (ATROVENT) 0 02 % inhalation solution 0 5 mg  0 5 mg Nebulization TID David Wells PA-C   0 5 mg at 01/14/20 5490    levalbuterol (XOPENEX) inhalation solution 1 25 mg  1 25 mg Nebulization TID David Wells PA-C   1 25 mg at 01/14/20 5118    lidocaine (LIDODERM) 5 % patch 1 patch  1 patch Topical Daily David Wells PA-C   1 patch at 01/14/20 1323    lisinopril (ZESTRIL) tablet 5 mg  5 mg Oral Daily David Wells PA-C   5 mg at 01/14/20 0918    nicotine (NICODERM CQ) 14 mg/24hr TD 24 hr patch 1 patch  1 patch Transdermal Daily David Wells PA-C   1 patch at 01/14/20 0918    traMADol (ULTRAM) tablet 50 mg  50 mg Oral Q6H PRN David Wells PA-C              Objective:    Vitals:    01/14/20 0700 01/14/20 0834 01/14/20 0835 01/14/20 1100   BP: 144/64   123/59   BP Location: Right arm   Right arm   Pulse: 63   72   Resp: 20   (!) 23   Temp: 97 9 °F (36 6 °C)   98 1 °F (36 7 °C)   TempSrc: Oral   Oral   SpO2: 92% 92% 92% 92%   Weight:       Height:            Physical Exam    Const: NAD  Abd: Soft    Lab Results   Component Value Date    WBC 14 54 (H) 01/14/2020    HGB 11 9 01/14/2020    HCT 36 6 01/14/2020     (H) 01/14/2020     01/14/2020     Lab Results   Component Value Date    INR 1 11 01/13/2020    INR 1 06 01/12/2020    PROTIME 14 4 01/13/2020    PROTIME 13 8 01/12/2020     Lab Results   Component Value Date    PTT 52 (H) 01/14/2020     I have personally reviewed pertinent imaging and laboratory results  Assessment/Plan:  - Liver biopsy  This procedure has been fully reviewed with the patient and written informed consent has been obtained

## 2020-01-15 LAB
ALBUMIN SERPL BCP-MCNC: 2.2 G/DL (ref 3.5–5)
ALP SERPL-CCNC: 188 U/L (ref 46–116)
ALT SERPL W P-5'-P-CCNC: 164 U/L (ref 12–78)
ANION GAP SERPL CALCULATED.3IONS-SCNC: 7 MMOL/L (ref 4–13)
APTT PPP: 53 SECONDS (ref 23–37)
APTT PPP: 55 SECONDS (ref 23–37)
APTT PPP: 71 SECONDS (ref 23–37)
AST SERPL W P-5'-P-CCNC: 260 U/L (ref 5–45)
ATRIAL RATE: 102 BPM
BASOPHILS # BLD AUTO: 0.01 THOUSANDS/ΜL (ref 0–0.1)
BASOPHILS NFR BLD AUTO: 0 % (ref 0–1)
BILIRUB SERPL-MCNC: 1 MG/DL (ref 0.2–1)
BUN SERPL-MCNC: 26 MG/DL (ref 5–25)
CALCIUM SERPL-MCNC: 7.6 MG/DL (ref 8.3–10.1)
CHLORIDE SERPL-SCNC: 102 MMOL/L (ref 100–108)
CO2 SERPL-SCNC: 34 MMOL/L (ref 21–32)
CREAT SERPL-MCNC: 0.74 MG/DL (ref 0.6–1.3)
EOSINOPHIL # BLD AUTO: 0 THOUSAND/ΜL (ref 0–0.61)
EOSINOPHIL NFR BLD AUTO: 0 % (ref 0–6)
ERYTHROCYTE [DISTWIDTH] IN BLOOD BY AUTOMATED COUNT: 13.6 % (ref 11.6–15.1)
GFR SERPL CREATININE-BSD FRML MDRD: 80 ML/MIN/1.73SQ M
GLUCOSE SERPL-MCNC: 121 MG/DL (ref 65–140)
GLUCOSE SERPL-MCNC: 173 MG/DL (ref 65–140)
HCT VFR BLD AUTO: 35.1 % (ref 34.8–46.1)
HGB BLD-MCNC: 11.1 G/DL (ref 11.5–15.4)
IMM GRANULOCYTES # BLD AUTO: 0.2 THOUSAND/UL (ref 0–0.2)
IMM GRANULOCYTES NFR BLD AUTO: 2 % (ref 0–2)
LYMPHOCYTES # BLD AUTO: 0.48 THOUSANDS/ΜL (ref 0.6–4.47)
LYMPHOCYTES NFR BLD AUTO: 4 % (ref 14–44)
MAGNESIUM SERPL-MCNC: 2.4 MG/DL (ref 1.6–2.6)
MCH RBC QN AUTO: 31.7 PG (ref 26.8–34.3)
MCHC RBC AUTO-ENTMCNC: 31.6 G/DL (ref 31.4–37.4)
MCV RBC AUTO: 100 FL (ref 82–98)
MONOCYTES # BLD AUTO: 0.62 THOUSAND/ΜL (ref 0.17–1.22)
MONOCYTES NFR BLD AUTO: 5 % (ref 4–12)
NEUTROPHILS # BLD AUTO: 11.49 THOUSANDS/ΜL (ref 1.85–7.62)
NEUTS SEG NFR BLD AUTO: 89 % (ref 43–75)
NRBC BLD AUTO-RTO: 0 /100 WBCS
P AXIS: 67 DEGREES
PLATELET # BLD AUTO: 158 THOUSANDS/UL (ref 149–390)
PMV BLD AUTO: 11.3 FL (ref 8.9–12.7)
POTASSIUM SERPL-SCNC: 3.6 MMOL/L (ref 3.5–5.3)
PR INTERVAL: 122 MS
PROT SERPL-MCNC: 5.7 G/DL (ref 6.4–8.2)
QRS AXIS: 40 DEGREES
QRSD INTERVAL: 98 MS
QT INTERVAL: 380 MS
QTC INTERVAL: 495 MS
RBC # BLD AUTO: 3.5 MILLION/UL (ref 3.81–5.12)
SODIUM SERPL-SCNC: 143 MMOL/L (ref 136–145)
T WAVE AXIS: 240 DEGREES
VENTRICULAR RATE: 102 BPM
WBC # BLD AUTO: 12.8 THOUSAND/UL (ref 4.31–10.16)

## 2020-01-15 PROCEDURE — 99232 SBSQ HOSP IP/OBS MODERATE 35: CPT | Performed by: INTERNAL MEDICINE

## 2020-01-15 PROCEDURE — 82948 REAGENT STRIP/BLOOD GLUCOSE: CPT

## 2020-01-15 PROCEDURE — 93010 ELECTROCARDIOGRAM REPORT: CPT | Performed by: INTERNAL MEDICINE

## 2020-01-15 PROCEDURE — 85025 COMPLETE CBC W/AUTO DIFF WBC: CPT | Performed by: INTERNAL MEDICINE

## 2020-01-15 PROCEDURE — 94640 AIRWAY INHALATION TREATMENT: CPT

## 2020-01-15 PROCEDURE — 93005 ELECTROCARDIOGRAM TRACING: CPT

## 2020-01-15 PROCEDURE — 97163 PT EVAL HIGH COMPLEX 45 MIN: CPT

## 2020-01-15 PROCEDURE — 94760 N-INVAS EAR/PLS OXIMETRY 1: CPT

## 2020-01-15 PROCEDURE — 83735 ASSAY OF MAGNESIUM: CPT | Performed by: INTERNAL MEDICINE

## 2020-01-15 PROCEDURE — 80053 COMPREHEN METABOLIC PANEL: CPT | Performed by: INTERNAL MEDICINE

## 2020-01-15 PROCEDURE — 85730 THROMBOPLASTIN TIME PARTIAL: CPT | Performed by: INTERNAL MEDICINE

## 2020-01-15 RX ORDER — FUROSEMIDE 40 MG/1
40 TABLET ORAL DAILY
Status: DISCONTINUED | OUTPATIENT
Start: 2020-01-16 | End: 2020-01-17 | Stop reason: HOSPADM

## 2020-01-15 RX ORDER — POLYETHYLENE GLYCOL 3350 17 G/17G
17 POWDER, FOR SOLUTION ORAL DAILY
Status: DISCONTINUED | OUTPATIENT
Start: 2020-01-15 | End: 2020-01-17 | Stop reason: HOSPADM

## 2020-01-15 RX ORDER — AMOXICILLIN 250 MG
1 CAPSULE ORAL 2 TIMES DAILY
Status: DISCONTINUED | OUTPATIENT
Start: 2020-01-15 | End: 2020-01-17 | Stop reason: HOSPADM

## 2020-01-15 RX ADMIN — HEPARIN SODIUM 1800 UNITS: 1000 INJECTION INTRAVENOUS; SUBCUTANEOUS at 14:38

## 2020-01-15 RX ADMIN — APIXABAN 5 MG: 5 TABLET, FILM COATED ORAL at 16:15

## 2020-01-15 RX ADMIN — METOPROLOL TARTRATE 25 MG: 25 TABLET, FILM COATED ORAL at 12:00

## 2020-01-15 RX ADMIN — DILTIAZEM HYDROCHLORIDE 30 MG: 30 TABLET, FILM COATED ORAL at 06:15

## 2020-01-15 RX ADMIN — SENNOSIDES AND DOCUSATE SODIUM 1 TABLET: 8.6; 5 TABLET ORAL at 17:39

## 2020-01-15 RX ADMIN — FUROSEMIDE 40 MG: 10 INJECTION, SOLUTION INTRAMUSCULAR; INTRAVENOUS at 08:38

## 2020-01-15 RX ADMIN — HEPARIN SODIUM 1800 UNITS: 1000 INJECTION INTRAVENOUS; SUBCUTANEOUS at 06:20

## 2020-01-15 RX ADMIN — METOPROLOL TARTRATE 25 MG: 25 TABLET, FILM COATED ORAL at 20:58

## 2020-01-15 RX ADMIN — LEVALBUTEROL HYDROCHLORIDE 1.25 MG: 1.25 SOLUTION, CONCENTRATE RESPIRATORY (INHALATION) at 20:29

## 2020-01-15 RX ADMIN — LISINOPRIL 5 MG: 5 TABLET ORAL at 08:38

## 2020-01-15 RX ADMIN — LEVALBUTEROL HYDROCHLORIDE 1.25 MG: 1.25 SOLUTION, CONCENTRATE RESPIRATORY (INHALATION) at 13:57

## 2020-01-15 RX ADMIN — LEVALBUTEROL HYDROCHLORIDE 1.25 MG: 1.25 SOLUTION, CONCENTRATE RESPIRATORY (INHALATION) at 07:50

## 2020-01-15 RX ADMIN — BUDESONIDE AND FORMOTEROL FUMARATE DIHYDRATE 2 PUFF: 160; 4.5 AEROSOL RESPIRATORY (INHALATION) at 08:38

## 2020-01-15 RX ADMIN — IPRATROPIUM BROMIDE 0.5 MG: 0.5 SOLUTION RESPIRATORY (INHALATION) at 20:29

## 2020-01-15 RX ADMIN — IPRATROPIUM BROMIDE 0.5 MG: 0.5 SOLUTION RESPIRATORY (INHALATION) at 13:57

## 2020-01-15 RX ADMIN — SENNOSIDES AND DOCUSATE SODIUM 1 TABLET: 8.6; 5 TABLET ORAL at 08:38

## 2020-01-15 RX ADMIN — BUDESONIDE AND FORMOTEROL FUMARATE DIHYDRATE 2 PUFF: 160; 4.5 AEROSOL RESPIRATORY (INHALATION) at 17:39

## 2020-01-15 RX ADMIN — LIDOCAINE 1 PATCH: 50 PATCH TOPICAL at 08:38

## 2020-01-15 RX ADMIN — IPRATROPIUM BROMIDE 0.5 MG: 0.5 SOLUTION RESPIRATORY (INHALATION) at 07:50

## 2020-01-15 RX ADMIN — POLYETHYLENE GLYCOL 3350 17 G: 17 POWDER, FOR SOLUTION ORAL at 08:39

## 2020-01-15 NOTE — ASSESSMENT & PLAN NOTE
Likely secondary to liver metastases  Patient with numerous liver masses, left lower lobe pulmonary consultation or mass, and Several left anterior subcutaneous soft tissue nodules likely metastases  Chest CT scan with  Left hilar mass resulting in occlusion of the left lower lobe bronchus with associated postobstructive atelectasis  Mediastinal/left hilar adenopathy, left chest wall nodules and innumerable hepatic lesions concerning for metastatic disease    Status post IR liver biopsy on 01/14  Pathology is pending  Oncology is following

## 2020-01-15 NOTE — PLAN OF CARE
Problem: PHYSICAL THERAPY ADULT  Goal: Performs mobility at highest level of function for planned discharge setting  See evaluation for individualized goals  Description  Treatment/Interventions: Functional transfer training, LE strengthening/ROM, Elevations, Therapeutic exercise, Endurance training, Patient/family training, Equipment eval/education, Bed mobility, Gait training, Spoke to nursing          See flowsheet documentation for full assessment, interventions and recommendations  Note:   Prognosis: Good  Problem List: Decreased strength, Decreased endurance, Impaired balance, Decreased mobility, Pain, Orthopedic restrictions  Assessment: pt is a 73y/o f who presents to [facility] c a-fib c RVR  off note, pt recently underwent C-spine CT which showed metastatic disease  s/p liver bx 1/14/20  PMH significant for COPD, smoker, osteoporosis, B/L UE numbness, + metastatic disease  at baseline, pt mod (I) c functional mobility c SPC  resides c spouse + grandaughter in 2 story home c 3 OSCAR + 12 steps to 2nd floor  currently requires min (A)x1 to complete mobility tasks 2* deficits in strength, balance, gait quality, pain, + activity tolerance noted in PT exam above  Barthel Index 50/100  ambulated 5' + 15' c RW c seated rest btwn trials  SaO2 desated to 86% on 3L O2 c ambulation c recovery to 90's c seated rest  pt educated on back safety precautions for jt protection c good verbal understanding  remained OOB in chair at end of session c chair alarm activated  would benefit from skilled PT to maximize functional mobility + return home safely  upon d/c, recommend pt return home c family support + hhpt  PT eval of high complexity 2* unstable med status c pt requiring ongoing medical management 2* a-fib c RVR  pending results of liver bx  pt c multiple co-morbidities + mobility deficits above requiring (A)x1 + RW  resides in 2 story home c 12 steps to bedroom    Barriers to Discharge: Inaccessible home environment Recommendation: Home PT, Home with family support          See flowsheet documentation for full assessment

## 2020-01-15 NOTE — ASSESSMENT & PLAN NOTE
New onset,  Cardiac echo with EF 40% with diffuse hypokinesis and moderate AS  Currently HR is controlled  Cardiology is following  Continue with heparin drip and diltiazem

## 2020-01-15 NOTE — ASSESSMENT & PLAN NOTE
Multifactorial likely secondary to CHF, likely widely metastatic disease and low albumin  Improving  Continue diuresis with IV Lasix  Negative fluid balance

## 2020-01-15 NOTE — PROGRESS NOTES
Cardiology Progress Note - Rayray Santos 76 y o  female MRN: [id number]    Unit/Bed#:  Encounter: [id number]      Assessment/Plan:  1- New onset atrial fibrillation  Will start Eliquis 5mg BID starting this PM  Discontinue IV heparin  Switch Cardizem with Lopressor 2/2 low EF  Continue to monitor telemetry  2- Acute systolic CHF, appears pretty euvolemic today on exam  Switch to PO lasix 40mg daily  Salt restrictions daily weights strict I&Os  3- Cardiomyopathy, unspecified, with EF 40%  Switch to BB  No role for invasive ischemic workup at this time given severe metastatic disease  Consider adding ACEi if able  4- Metastatic disease with innumerable hepatic lesions and transaminitis  S/P IR guided biopsy 1/14/2020  5- Elevated troponins of unclear significance  Peaked at 0 08    6- HTN, elevated at times but overall stable  Continue meds as above  Continue monitoring  Subjective:   Patient seen and examined  SOB getting better  Denies CP or discomfort  Objective:     Vitals: Blood pressure 138/62, pulse 76, temperature 98 7 °F (37 1 °C), temperature source Oral, resp  rate (!) 26, height 4' 11" (1 499 m), weight 62 8 kg (138 lb 7 2 oz), SpO2 95 %  , Body mass index is 27 96 kg/m² ,   Orthostatic Blood Pressures      Most Recent Value   Blood Pressure  138/62 filed at 01/15/2020 1200   Patient Position - Orthostatic VS  Sitting filed at 01/15/2020 2632            Intake/Output Summary (Last 24 hours) at 1/15/2020 1501  Last data filed at 1/15/2020 1200  Gross per 24 hour   Intake 465 78 ml   Output 1125 ml   Net -659 22 ml         Physical Exam:    GEN: Rayray Santos appears well, alert and oriented x 3, pleasant and cooperative   HEENT: pupils equal, round, and reactive to light; extraocular muscles intact  NECK: supple, no carotid bruits   HEART: regular rhythm, normal S1 and S2, no murmurs, clicks, gallops or rubs   LUNGS: clear to auscultation bilaterally; no wheezes, rales, or rhonchi   ABDOMEN: normal bowel sounds, soft, no tenderness, no distention  EXTREMITIES: peripheral pulses normal; no clubbing, cyanosis, or edema      Medications:      Current Facility-Administered Medications:     acetaminophen (TYLENOL) tablet 650 mg, 650 mg, Oral, Q6H PRN, Rita Tay PA-C    albuterol (PROVENTIL HFA,VENTOLIN HFA) inhaler 2 puff, 2 puff, Inhalation, Q4H PRN, Rita Tay PA-C    budesonide-formoterol (SYMBICORT) 160-4 5 mcg/act inhaler 2 puff, 2 puff, Inhalation, BID, Rita Tay PA-C, 2 puff at 01/15/20 0838    [START ON 1/16/2020] furosemide (LASIX) tablet 40 mg, 40 mg, Oral, Daily, Rodrigo Pitts PA-C    heparin (porcine) 25,000 units in 250 mL infusion (premix), 3-20 Units/kg/hr (Order-Specific), Intravenous, Titrated, Rita Tay PA-C, Last Rate: 12 mL/hr at 01/15/20 1400, 20 Units/kg/hr at 01/15/20 1400    heparin (porcine) injection 1,800 Units, 1,800 Units, Intravenous, PRN, Rita Tay PA-C, 1,800 Units at 01/15/20 1438    heparin (porcine) injection 3,600 Units, 3,600 Units, Intravenous, PRN, Rita Tay PA-C, 3,600 Units at 01/14/20 2331    ipratropium (ATROVENT) 0 02 % inhalation solution 0 5 mg, 0 5 mg, Nebulization, TID, Rita Tay PA-C, 0 5 mg at 01/15/20 1357    levalbuterol (XOPENEX) inhalation solution 1 25 mg, 1 25 mg, Nebulization, TID, Rita Tay PA-C, 1 25 mg at 01/15/20 1357    lidocaine (LIDODERM) 5 % patch 1 patch, 1 patch, Topical, Daily, Rita Tay PA-C, 1 patch at 01/15/20 0838    lisinopril (ZESTRIL) tablet 5 mg, 5 mg, Oral, Daily, Rita Tay PA-C, 5 mg at 01/15/20 7695    metoprolol tartrate (LOPRESSOR) tablet 25 mg, 25 mg, Oral, Q12H [facility], Rodrigo Pitts PA-C, 25 mg at 01/15/20 1200    nicotine (NICODERM CQ) 14 mg/24hr TD 24 hr patch 1 patch, 1 patch, Transdermal, Daily, Rita Tay PA-C, 1 patch at 01/14/20 0918    polyethylene glycol (MIRALAX) packet 17 g, 17 g, Oral, Daily, Padmini Eden DO, 17 g at 01/15/20 8074    senna-docusate sodium (SENOKOT S) 8 6-50 mg per tablet 1 tablet, 1 tablet, Oral, BID, Daina [place], DO, 1 tablet at 01/15/20 0838    traMADol (ULTRAM) tablet 50 mg, 50 mg, Oral, Q6H PRN, Wilma Harvey PA-C, 50 mg at 01/14/20 2258     Labs & Results:    Results from last 7 days   Lab Units 01/13/20  0020 01/12/20  2138 01/12/20  1713   TROPONIN I ng/mL 0 07* 0 08* 0 05*     Results from last 7 days   Lab Units 01/15/20  0542 01/14/20  0430 01/13/20  1539   WBC Thousand/uL 12 80* 14 54* 14 87*   HEMOGLOBIN g/dL 11 1* 11 9 12 2   HEMATOCRIT % 35 1 36 6 38 1   PLATELETS Thousands/uL 158 175 202         Results from last 7 days   Lab Units 01/15/20  0542 01/14/20  0431 01/13/20  0645   POTASSIUM mmol/L 3 6 3 6 4 1   CHLORIDE mmol/L 102 99* 103   CO2 mmol/L 34* 32 30   BUN mg/dL 26* 26* 35*   CREATININE mg/dL 0 74 0 84 0 93   CALCIUM mg/dL 7 6* 7 8* 7 8*   ALK PHOS U/L 188* 211* 212*   ALT U/L 164* 181* 190*   AST U/L 260* 225* 232*     Results from last 7 days   Lab Units 01/15/20  1207 01/15/20  0542 01/15/20  0340  01/13/20  1539 01/12/20  2138   INR   --   --   --   --  1 11 1 06   PTT seconds 55* 53* 71*   < > 24  --     < > = values in this interval not displayed       Results from last 7 days   Lab Units 01/15/20  0542 01/14/20  0431   MAGNESIUM mg/dL 2 4 2 3

## 2020-01-15 NOTE — PROGRESS NOTES
Progress Note - Yolie Dixon 1945, 76 y o  female MRN: [id number]    Unit/Bed#:  Encounter: [id number]    Primary Care Provider: Randell Torres MD   Date and time admitted to hospital: 1/12/2020  4:52 PM        Generalized edema  Assessment & Plan  Multifactorial likely secondary to CHF, likely widely metastatic disease and low albumin  Improving  Continue diuresis with IV Lasix  Negative fluid balance    Transaminitis  Assessment & Plan  Likely secondary to liver metastases  Patient with numerous liver masses, left lower lobe pulmonary consultation or mass, and Several left anterior subcutaneous soft tissue nodules likely metastases  Chest CT scan with  Left hilar mass resulting in occlusion of the left lower lobe bronchus with associated postobstructive atelectasis  Mediastinal/left hilar adenopathy, left chest wall nodules and innumerable hepatic lesions concerning for metastatic disease  Status post IR liver biopsy on 01/14  Pathology is pending  Oncology is following          * Atrial fibrillation with rapid ventricular response (HCC)  Assessment & Plan  New onset,  Cardiac echo with EF 40% with diffuse hypokinesis and moderate AS  Currently HR is controlled  Cardiology is following  Continue with heparin drip and diltiazem    Numbness of fingers of both hands  Assessment & Plan  Secondary to cervical cord compression with myelopathy   Monitor closely for any compression syndrome  Outpatient neurosurgery follow-up    Chronic obstructive pulmonary disease ([facility] [address] )  Assessment & Plan  With active tobacco smoking  Currently not in exacerbation  Continue p r n  Nebs  Continue Symbicort    Continue nicotine patch    Benign essential hypertension  Assessment & Plan  Continue to monitor  Currently on Cardizem and lisinopril     Add stool softeners    VTE Pharmacologic Prophylaxis:   Pharmacologic: Heparin Drip  Mechanical VTE Prophylaxis in Place: Yes    Patient Centered Rounds: I have performed bedside rounds with nursing staff today  Discussions with Specialists or Other Care Team Provider:     Education and Discussions with Family / Patient:  Patient, unable to reach daughter by phone    Time Spent for Care: 30 minutes  More than 50% of total time spent on counseling and coordination of care as described above  Current Length of Stay: 3 day(s)    Current Patient Status: Inpatient   Certification Statement: The patient will continue to require additional inpatient hospital stay due to Above    Discharge Plan / Estimated Discharge Date:  PT OT eval    Code Status: Level 1 - Full Code      Subjective:   Patient seen and examined  Still with short of breath  Clinically improving  No nausea vomiting  No bowel movement yet    Objective:     Vitals:   Temp (24hrs), Av 9 °F (36 6 °C), Min:97 7 °F (36 5 °C), Max:98 1 °F (36 7 °C)    Temp:  [97 7 °F (36 5 °C)-98 1 °F (36 7 °C)] 98 °F (36 7 °C)  HR:  [] 72  Resp:  [18-36] 24  BP: (119-177)/(57-77) 142/63  SpO2:  [90 %-100 %] 98 %  Body mass index is 27 96 kg/m²  Input and Output Summary (last 24 hours):        Intake/Output Summary (Last 24 hours) at 1/15/2020 1043  Last data filed at 1/15/2020 0840  Gross per 24 hour   Intake 429 78 ml   Output 1025 ml   Net -595 22 ml       Physical Exam:     Physical Exam  Patient is awake alert in no acute distress  Lung with decreased breath sounds bilateral and expiratory wheezing  Heart positive S1-S2 no murmur  Abdomen soft nontender with  subcutaneous edema  Lower extremity no edema  Additional Data:     Labs:    Results from last 7 days   Lab Units 01/15/20  0542   WBC Thousand/uL 12 80*   HEMOGLOBIN g/dL 11 1*   HEMATOCRIT % 35 1   PLATELETS Thousands/uL 158   NEUTROS PCT % 89*   LYMPHS PCT % 4*   MONOS PCT % 5   EOS PCT % 0     Results from last 7 days   Lab Units 01/15/20  0542   POTASSIUM mmol/L 3 6   CHLORIDE mmol/L 102   CO2 mmol/L 34*   BUN mg/dL 26*   CREATININE mg/dL 0 74   CALCIUM mg/dL 7 6*   ALK PHOS U/L 188*   ALT U/L 164*   AST U/L 260*     Results from last 7 days   Lab Units 01/13/20  1539   INR  1 11       * I Have Reviewed All Lab Data Listed Above  * Additional Pertinent Lab Tests Reviewed: [address] 66 Admission Reviewed    Imaging:    Imaging Reports Reviewed Today Include:   Imaging Personally Reviewed by Myself Includes:     Recent Cultures (last 7 days):           Last 24 Hours Medication List:     Current Facility-Administered Medications:  acetaminophen 650 mg Oral Q6H PRN Tracey Life, PA-C    albuterol 2 puff Inhalation Q4H PRN Tracey Life, PA-C    budesonide-formoterol 2 puff Inhalation BID Tracey Life, PA-C    diltiazem 30 mg Oral Q6H [address] Tracey Life, PA-C    furosemide 40 mg Intravenous Daily Tracey Life, PA-C    heparin (porcine) 3-20 Units/kg/hr (Order-Specific) Intravenous Titrated Tracey Life, PA-C Last Rate: 18 Units/kg/hr (01/15/20 0620)   heparin (porcine) 1,800 Units Intravenous PRN Tracey Life, PA-C    heparin (porcine) 3,600 Units Intravenous PRN Tracey Life, PA-C    ipratropium 0 5 mg Nebulization TID Tracey Life, PA-C    levalbuterol 1 25 mg Nebulization TID Rtacey Life, PA-C    lidocaine 1 patch Topical Daily Tracey Life, PA-C    lisinopril 5 mg Oral Daily Tracey Life, PA-C    nicotine 1 patch Transdermal Daily Tracey Life, PA-C    polyethylene glycol 17 g Oral Daily Honey Marie DO    senna-docusate sodium 1 tablet Oral BID Honey Marie DO    traMADol 50 mg Oral Q6H PRN Tracey Life, PA-C         Today, Patient Was Seen By: Honey Marie DO    ** Please Note: This note has been constructed using a voice recognition system   **

## 2020-01-15 NOTE — PHYSICAL THERAPY NOTE
PT Evaluation (17min)  (9:35-9:52)    Past Medical History:   Diagnosis Date    Abnormal mammogram 1/7/2016    Chronic bronchitis (HCC)     Last Assessed:  3/23/16    Chronic pain of both knees 12/3/2018    Fibromyalgia     History of aneurysm 2004    Hypertension     Recurrent major depressive disorder, in partial remission ([facility] [address] ) 12/20/2016        01/15/20 0943   Note Type   Note type Eval only   Pain Assessment   Pain Assessment 0-10   Pain Score 2   Pain Type Chronic pain   Pain Location Arm;Back; Shoulder   Pain Orientation Bilateral   Hospital Pain Intervention(s) Ambulation/increased activity   Home Living   Type of [address] [place] [address] Two level;Bed/bath upstairs;Stairs to enter with rails  (3-4 OSCAR; 13 steps to 2nd floor to bedroom)   Bathroom Shower/Tub Tub/shower unit   Bathroom Equipment Shower chair;Grab bars in shower;Commode   [address] Cane;Walker   Prior Function   Level of [place] Independent with ADLs and functional mobility  (ambulates c SPC)   Lives With Spouse; Family  (grandchildren)   Receives Help From Family   ADL Assistance Independent   IADLs Needs assistance   Falls in the last 6 months 1 to 4  (1 fall in bathroom)   Vocational Retired   Comments (-) drive   Restrictions/Precautions   Wells Angella Bearing Precautions Per Order No   Other Precautions Chair Alarm; Bed Alarm;Multiple lines;Telemetry;O2;Fall Risk;Pain;Spinal precautions  (back safety precautions for jt protection)   General   Additional Pertinent History pt presents to [facility] c a-fib c RVR  off note, pt c recent imaging of c-spine evident for metastatic disease  pt s/p liver bx 1/14/20  PT consulted for mobility + d/c planning  up + OOB as tolerated     Family/Caregiver Present No   Cognition   Orientation Level Oriented X4   RUE Assessment   RUE Assessment WFL  (grossly 3/5; MMT deferred 2* metastatic disease)   LUE Assessment   LUE Assessment WFL  (grossly 3/5; MMT deferred 2* metastatic disease)   RLE Assessment   RLE Assessment WFL  (grossly 3/5; MMT deferred 2* metastatic disease)   LLE Assessment   LLE Assessment WFL  (grossly 3/5; MMT deferred 2* metastatic disease)   Coordination   Sensation WFL   Transfers   Sit to Stand 4  Minimal assistance   Additional items Assist x 1; Armrests; Verbal cues; Increased time required   Stand to Sit 4  Minimal assistance   Additional items Assist x 1; Armrests; Verbal cues; Increased time required   Toilet transfer 4  Minimal assistance   Additional items Assist x 1; Armrests; Verbal cues; Commode; Increased time required   Ambulation/Elevation   Gait pattern Narrow HONEY; Forward Flexion;Decreased foot clearance; Short stride   Gait Assistance 4  Minimal assist   Additional items Assist x 1;Verbal cues   Assistive Device Rolling walker   Distance 5' + 15' limited by lines + fatigue   Stair Management Assistance Not tested   Balance   Static Sitting Good   Dynamic Sitting Good   Static Standing Fair   Dynamic Standing Fair   Ambulatory Fair   Endurance Deficit   Endurance Deficit Yes   Endurance Deficit Description desated to 86% c ambulation on 3L O2  Activity Tolerance   Activity Tolerance Patient limited by fatigue;Patient limited by pain   Nurse Made Aware [place] L[place] aware pt ambulated (A)x1 c RW  seated in chair at end of session c chair alarm activated  Assessment   Prognosis Good   Problem List Decreased strength;Decreased endurance; Impaired balance;Decreased mobility;Pain;Orthopedic restrictions   Assessment pt is a 75y/o f who presents to [facility] c a-fib c RVR  off note, pt recently underwent C-spine CT which showed metastatic disease  s/p liver bx 1/14/20  PMH significant for COPD, smoker, osteoporosis, B/L UE numbness, + metastatic disease  at baseline, pt mod (I) c functional mobility c SPC  resides c spouse + grandaughter in 2 story home c 3 OSCAR + 12 steps to 2nd floor   currently requires min (A)x1 to complete mobility tasks 2* deficits in strength, balance, gait quality, pain, + activity tolerance noted in PT exam above  Barthel Index 50/100  ambulated 5' + 15' c RW c seated rest btwn trials  SaO2 desated to 86% on 3L O2 c ambulation c recovery to 90's c seated rest  pt educated on back safety precautions for jt protection c good verbal understanding  remained OOB in chair at end of session c chair alarm activated  would benefit from skilled PT to maximize functional mobility + return home safely  upon d/c, recommend pt return home c family support + hhpt  PT eval of high complexity 2* unstable med status c pt requiring ongoing medical management 2* a-fib c RVR  pending results of liver bx  pt c multiple co-morbidities + mobility deficits above requiring (A)x1 + RW  resides in 2 story home c 12 steps to bedroom  Barriers to Discharge Inaccessible home environment   Goals   Patient Goals "to get up the stairs"  STG Expiration Date 01/25/20   Short Term Goal #1 1  increase strength 1/2 grade to improve overall functional mobility, 2  perform transfers mod (I) to safely perform ADLs, 3  ambulate >150' c least restrictive AD mod (I) to safely navigate home environment, 4  negotiate 12 stairs mod (I) to safely access bedroom on 2nd floor   Plan   Treatment/Interventions Functional transfer training;LE strengthening/ROM; Elevations; Therapeutic exercise; Endurance training;Patient/family training;Equipment eval/education; Bed mobility;Gait training;Spoke to nursing   PT Frequency Other (Comment)  (3-5x/wk)   Recommendation   Recommendation Home PT; Home with family support   Modified Tanya Scale   Modified [place] Scale 4   Barthel Index   Feeding 5   Bathing 0   Grooming Score 5   Dressing Score 5   Bladder Score 10   Bowels Score 10   Toilet Use Score 5   Transfers (Bed/Chair) Score 10   Mobility (Level Surface) Score 0   Stairs Score 0   Barthel Index Score 50     Tyrell Breen, PT, DPT

## 2020-01-16 ENCOUNTER — APPOINTMENT (INPATIENT)
Dept: MRI IMAGING | Facility: HOSPITAL | Age: 75
DRG: 291 | End: 2020-01-16
Payer: COMMERCIAL

## 2020-01-16 PROBLEM — C80.1 SMALL CELL CARCINOMA (HCC): Status: ACTIVE | Noted: 2020-01-16

## 2020-01-16 PROBLEM — I50.21 ACUTE SYSTOLIC CHF (CONGESTIVE HEART FAILURE) (HCC): Status: ACTIVE | Noted: 2020-01-16

## 2020-01-16 LAB — APTT PPP: 25 SECONDS (ref 23–37)

## 2020-01-16 PROCEDURE — 99232 SBSQ HOSP IP/OBS MODERATE 35: CPT | Performed by: INTERNAL MEDICINE

## 2020-01-16 PROCEDURE — 94761 N-INVAS EAR/PLS OXIMETRY MLT: CPT

## 2020-01-16 PROCEDURE — 99233 SBSQ HOSP IP/OBS HIGH 50: CPT | Performed by: INTERNAL MEDICINE

## 2020-01-16 PROCEDURE — 94640 AIRWAY INHALATION TREATMENT: CPT

## 2020-01-16 PROCEDURE — 94760 N-INVAS EAR/PLS OXIMETRY 1: CPT

## 2020-01-16 PROCEDURE — 85730 THROMBOPLASTIN TIME PARTIAL: CPT | Performed by: INTERNAL MEDICINE

## 2020-01-16 RX ADMIN — BUDESONIDE AND FORMOTEROL FUMARATE DIHYDRATE 2 PUFF: 160; 4.5 AEROSOL RESPIRATORY (INHALATION) at 08:09

## 2020-01-16 RX ADMIN — BUDESONIDE AND FORMOTEROL FUMARATE DIHYDRATE 2 PUFF: 160; 4.5 AEROSOL RESPIRATORY (INHALATION) at 17:24

## 2020-01-16 RX ADMIN — LEVALBUTEROL HYDROCHLORIDE 1.25 MG: 1.25 SOLUTION, CONCENTRATE RESPIRATORY (INHALATION) at 14:46

## 2020-01-16 RX ADMIN — LIDOCAINE 1 PATCH: 50 PATCH TOPICAL at 08:07

## 2020-01-16 RX ADMIN — NICOTINE 1 PATCH: 14 PATCH TRANSDERMAL at 08:07

## 2020-01-16 RX ADMIN — APIXABAN 5 MG: 5 TABLET, FILM COATED ORAL at 08:07

## 2020-01-16 RX ADMIN — SENNOSIDES AND DOCUSATE SODIUM 1 TABLET: 8.6; 5 TABLET ORAL at 08:07

## 2020-01-16 RX ADMIN — LISINOPRIL 5 MG: 5 TABLET ORAL at 08:07

## 2020-01-16 RX ADMIN — TRAMADOL HYDROCHLORIDE 50 MG: 50 TABLET, FILM COATED ORAL at 20:26

## 2020-01-16 RX ADMIN — TRAMADOL HYDROCHLORIDE 50 MG: 50 TABLET, FILM COATED ORAL at 08:07

## 2020-01-16 RX ADMIN — LEVALBUTEROL HYDROCHLORIDE 1.25 MG: 1.25 SOLUTION, CONCENTRATE RESPIRATORY (INHALATION) at 09:02

## 2020-01-16 RX ADMIN — FUROSEMIDE 40 MG: 40 TABLET ORAL at 08:07

## 2020-01-16 RX ADMIN — IPRATROPIUM BROMIDE 0.5 MG: 0.5 SOLUTION RESPIRATORY (INHALATION) at 14:46

## 2020-01-16 RX ADMIN — IPRATROPIUM BROMIDE 0.5 MG: 0.5 SOLUTION RESPIRATORY (INHALATION) at 09:02

## 2020-01-16 RX ADMIN — APIXABAN 5 MG: 5 TABLET, FILM COATED ORAL at 17:23

## 2020-01-16 RX ADMIN — LEVALBUTEROL HYDROCHLORIDE 1.25 MG: 1.25 SOLUTION, CONCENTRATE RESPIRATORY (INHALATION) at 21:02

## 2020-01-16 RX ADMIN — IPRATROPIUM BROMIDE 0.5 MG: 0.5 SOLUTION RESPIRATORY (INHALATION) at 21:02

## 2020-01-16 RX ADMIN — METOPROLOL TARTRATE 25 MG: 25 TABLET, FILM COATED ORAL at 08:07

## 2020-01-16 RX ADMIN — METOPROLOL TARTRATE 25 MG: 25 TABLET, FILM COATED ORAL at 20:27

## 2020-01-16 NOTE — ASSESSMENT & PLAN NOTE
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

## 2020-01-16 NOTE — ASSESSMENT & PLAN NOTE
With active tobacco smoking  Continue bronchodilator  Continue nicotine patch  Evaluate for home oxygen

## 2020-01-16 NOTE — ASSESSMENT & PLAN NOTE
New onset,  Cardiac echo with EF 40% with diffuse hypokinesis and moderate AS  Currently HR is controlled  Cardiology is following  Continue with Eliquis and diltiazem

## 2020-01-16 NOTE — PROGRESS NOTES
General Cardiology   Progress Note   Kong Mello 76 y o  female MRN: [id number]  Unit/Bed#:  Encounter: [id number]    Assessment/Plan:    1  New onset atrial fibrillation  -continue Eliquis 5mg BID   -continue metoprolol tartrate 25 mg b i d   -continue to monitor telemetry    2  Acute systolic heart failure   -euvolemic on physical exam  -continue PO lasix 40mg daily  -cardiac diet    -daily weights at and strict I&Os    3  cardiomyopathy, unspecified, with EF 40%  -continue metoprolol tartrate 25 mg b i d  And lisinopril 5 mg daily  -no role for invasive ischemic workup at this time given severe metastatic disease     4  Elevated troponin  -troponin with peak at 0 08 and trending down  -unclear significance  -continue to monitor    6- HTN, currently controlled   -continue lisinopril metoprolol tartrate  -continue to monitor blood pressures    7  Hepatic metastatic carcinoma with left hilar mass  -pathology report pending      Subjective:   Patient seen and examined  No significant events since the last encounter  REVIEW OF SYSTEMS:  Constitutional:  Denies fever or chills   Eyes:  Denies change in visual acuity   HENT:  Denies nasal congestion or sore throat   Respiratory:  Denies cough or shortness of breath   Cardiovascular:  Denies chest pain or edema   GI:  Denies abdominal pain, nausea, vomiting, bloody stools, constipation or diarrhea   :  Denies dysuria, frequency, difficulty in urination or nocturia  Musculoskeletal:  Denies back pain or joint pain   Neurologic:  Denies headache, focal weakness or sensory changes   Endocrine:  Denies polyuria or polydipsia   Lymphatic:  Denies swollen glands   Psychiatric:  Denies depression or anxiety     Objective:   Vitals:  Vitals:    01/16/20 0905   BP:    Pulse:    Resp:    Temp:    SpO2: 94%       Body mass index is 27 96 kg/m²    Systolic (47OPC), WTP:560 , Min:124 , QMK:781     Diastolic (63TRJ), JAP:38, Min:59, Max:67      Intake/Output Summary (Last 24 hours) at 1/16/2020 1111  Last data filed at 1/16/2020 0753  Gross per 24 hour   Intake 156 ml   Output 900 ml   Net -744 ml     Weight (last 2 days)     None          Telemetry Review: No significant arrhythmias seen on telemetry review  PHYSICAL EXAMS  General:  Patient is not in acute distress, laying in the bed comfortably, awake, alert responding to commands  Head: Normocephalic, Atraumatic  HEENT: White sclera, pink conjunctiva  Neck:  Supple, no neck vein distention  Respiratory: clear to P/A  Cardiovascular: S1-S2 normal, no murmurs, thrills, gallops, rubs, regular rhythm  GI:  Abdomen soft, nontender, non-distended  Extremities: No edema, normal pulses  Integument:  No skin rashes or ulceration  Neurologic:  Patient is awake alert, responding to command, oriented to person, place and time    Nd time   LABORATORY RESULTS:  Results from last 7 days   Lab Units 01/13/20  0020 01/12/20  2138 01/12/20  1713   TROPONIN I ng/mL 0 07* 0 08* 0 05*     CBC with diff:   Results from last 7 days   Lab Units 01/15/20  0542 01/14/20  0430 01/13/20  1539 01/13/20  0645  01/12/20  1713   WBC Thousand/uL 12 80* 14 54* 14 87* 14 10*  --  15 38*   HEMOGLOBIN g/dL 11 1* 11 9 12 2 11 9  --  12 5   HEMATOCRIT % 35 1 36 6 38 1 37 2  --  39 6   MCV fL 100* 101* 100* 102*  --  102*   PLATELETS Thousands/uL 158 175 202 167   < > 199   MCH pg 31 7 32 7 32 1 32 5  --  32 1   MCHC g/dL 31 6 32 5 32 0 32 0  --  31 6   RDW % 13 6 13 6 13 6 13 4  --  13 4   MPV fL 11 3 11 1 11 4 11 1   < > 11 3   NRBC AUTO /100 WBCs 0  --   --  0  --  0    < > = values in this interval not displayed         CMP:  Results from last 7 days   Lab Units 01/15/20  0542 01/14/20  0431 01/13/20  0645   POTASSIUM mmol/L 3 6 3 6 4 1   CHLORIDE mmol/L 102 99* 103   CO2 mmol/L 34* 32 30   BUN mg/dL 26* 26* 35*   CREATININE mg/dL 0 74 0 84 0 93   CALCIUM mg/dL 7 6* 7 8* 7 8*   AST U/L 260* 225* 232*   ALT U/L 164* 181* 190*   ALK PHOS U/L 188* 211* 212* EGFR ml/min/1 73sq m 80 69 61       BMP:  Results from last 7 days   Lab Units 01/15/20  0542 20  0431 20  0645   POTASSIUM mmol/L 3 6 3 6 4 1   CHLORIDE mmol/L 102 99* 103   CO2 mmol/L 34* 32 30   BUN mg/dL 26* 26* 35*   CREATININE mg/dL 0 74 0 84 0 93   CALCIUM mg/dL 7 6* 7 8* 7 8*         Results from last 7 days   Lab Units 20  1713   NT-PRO BNP pg/mL 4,907*      Results from last 7 days   Lab Units 01/15/20  0542 20  0431   MAGNESIUM mg/dL 2 4 2 3         Results from last 7 days   Lab Units 20  0645   TSH 3RD GENERATON uIU/mL 0 453     Results from last 7 days   Lab Units 20  1539 20  2138   INR  1 11 1 06       Lipid Profile:   Lab Results   Component Value Date    CHOL 157 2015    CHOL 151 2014     Lab Results   Component Value Date    HDL 43 2019    HDL 39 (L) 2019    HDL 39 (L) 2018     Lab Results   Component Value Date    LDLCALC 102 (H) 2019    LDLCALC 72 2019    LDLCALC 90 2018     Lab Results   Component Value Date    TRIG 66 2019    TRIG 78 2019    TRIG 109 2018       Cardiac testing:   Results for orders placed during the hospital encounter of 20   Echo complete with contrast if indicated    Narrative [address]  [place], [place] [id number] [phone]    Transthoracic Echocardiogram  2D, M-mode, Doppler, and Color Doppler    Study date:  2020    Patient: Camelia Costa  MR number: [id number]  Account number: [de-identified]  : 1945  Age: 76 years  Gender: Female  Status: Inpatient  Location: Bedside  Height: 59 in  Weight: 138 lb  BP: 125/ 61 mmHg    Indications: Heart failure, Assess left ventricular function      Diagnoses: I50 9 - Heart failure, unspecified    Sonographer:   [facility] RDCS  Referring Physician:  Ty Franklin MD  Group:  [facility]  Interpreting Physician:  Jacey Floyd, MD    SUMMARY    LEFT VENTRICLE:  The ventricle was mildly dilated  Ejection fraction was estimated to be 40 %  There was mild diffuse hypokinesis  MITRAL VALVE:  There was mild regurgitation  AORTIC VALVE:  The valve was probably trileaflet  Leaflets exhibited moderate calcification  There was at least moderate stenosis  Peak gradient 35 mm Hg and mean 21 mm Hg  There was mild to moderate regurgitation  TRICUSPID VALVE:  There was mild regurgitation  HISTORY: PRIOR HISTORY: Risk factors: hypertension and a history of current cigarette use (within the last month)  Chronic lung disease  PROCEDURE: The procedure was performed at the bedside  This was a routine study  The transthoracic approach was used  The study included complete 2D imaging, M-mode, complete spectral Doppler, and color Doppler  The heart rate was 61 bpm,  at the start of the study  Images were obtained from the parasternal, apical, subcostal, and suprasternal notch acoustic windows  Image quality was adequate  LEFT VENTRICLE: The ventricle was mildly dilated  Ejection fraction was estimated to be 40 %  There was mild diffuse hypokinesis  DOPPLER: There was an increased relative contribution of atrial contraction to ventricular filling  RIGHT VENTRICLE: The size was normal  Systolic function was normal  Wall thickness was normal     LEFT ATRIUM: Size was normal     RIGHT ATRIUM: Size was normal     MITRAL VALVE: There was annular calcification  DOPPLER: There was mild regurgitation  AORTIC VALVE: The valve was probably trileaflet  Leaflets exhibited moderate calcification  The valve was not well visualized  DOPPLER: There was at least moderate stenosis  Peak gradient 35 mm Hg and mean 21 mm Hg  There was mild to  moderate regurgitation  TRICUSPID VALVE: The valve structure was normal  There was normal leaflet separation  DOPPLER: The transtricuspid velocity was within the normal range  There was no evidence for stenosis  There was mild regurgitation  PULMONIC VALVE: Leaflets exhibited normal thickness, no calcification, and normal cuspal separation  DOPPLER: The transpulmonic velocity was within the normal range  There was no regurgitation  PERICARDIUM: There was no pericardial effusion  The pericardium was normal in appearance  AORTA: The root exhibited normal size  SYSTEM MEASUREMENT TABLES    2D  %FS: 28 6 %  Ao Diam: 2 7 cm  EDV(Teich): 141 3 ml  EF Biplane: 41 %  EF(Teich): 54 7 %  ESV(Teich): 64 1 ml  IVSd: 0 9 cm  LA Area: 21 6 cm2  LA Diam: 3 5 cm  LVEDV MOD A2C: 155 7 ml  LVEDV MOD A4C: 126 8 ml  LVEDV MOD BP: 140 5 ml  LVEF MOD A2C: 39 3 %  LVEF MOD A4C: 43 7 %  LVESV MOD A2C: 94 4 ml  LVESV MOD A4C: 71 4 ml  LVESV MOD BP: 82 9 ml  LVIDd: 5 4 cm  LVIDs: 3 9 cm  LVLd A2C: 8 4 cm  LVLd A4C: 8 4 cm  LVLs A2C: 7 cm  LVLs A4C: 6 8 cm  LVOT Diam: 2 2 cm  LVPWd: 0 9 cm  RA Area: 15 7 cm2  RVIDd: 3 6 cm  SV MOD A2C: 61 2 ml  SV MOD A4C: 55 4 ml  SV(Teich): 77 2 ml    CW  AR Dec [place]: 2 9 m/s2  AR Dec Time: 1571 6 ms  AR PHT: 455 7 ms  AR Vmax: 4 4 m/s  AR maxP 2 mmHg  AV Env  Ti: 323 5 ms  AV VTI: 70 2 cm  AV Vmax: 3 m/s  AV Vmean: 2 2 m/s  AV maxP 4 mmHg  AV meanP 8 mmHg    MM  TAPSE: 2 5 cm    PW  IMLDRED (VTI): 1 2 cm2  MILDRED Vmax: 1 cm2  E': 0 1 m/s  E/E': 19 2  LVOT Env  Ti: 376 8 ms  LVOT VTI: 22 5 cm  LVOT Vmax: 0 8 m/s  LVOT Vmean: 0 6 m/s  LVOT maxP 5 mmHg  LVOT meanP 6 mmHg  LVSV Dopp: 87 4 ml  MV A Te: 1 2 m/s  MV Dec [place]: 4 3 m/s2  MV DecT: 244 2 ms  MV E Te: 1 1 m/s  MV E/A Ratio: 0 9  MV PHT: 70 8 ms  MVA By PHT: 3 1 cm2    [employer] Accredited Echocardiography Laboratory    Prepared and electronically signed by    Jillian Barriga MD  Signed 2020 10:48:52         Meds/Allergies   all current active meds have been reviewed  Medications Prior to Admission   Medication    albuterol (PROVENTIL HFA) 90 mcg/act inhaler    alendronate (FOSAMAX) 70 mg tablet    amLODIPine (NORVASC) 5 mg tablet    benazepril (LOTENSIN) 20 mg tablet    budesonide-formoterol (SYMBICORT) 160-4 5 mcg/act inhaler            Counseling / Coordination of Care  Total floor / unit time spent today 15 minutes  Greater than 50% of total time was spent with the patient and / or family counseling and / or coordination of care  ** Please Note: Dragon 360 Dictation voice to text software may have been used in the creation of this document   **

## 2020-01-16 NOTE — SOCIAL WORK
CM met with pt and family at bedside  Pt and family are in agreement with going home with [address]  CM will contact hospice and see if a liaison can come to the hospital and speak to pt and family

## 2020-01-16 NOTE — RESPIRATORY THERAPY NOTE
Home Oxygen Qualifying Test       Patient name: Bri Dickens        : 1945   Date of Test:  2020  Diagnosis: A fib     Home Oxygen Test:    **Medicare Guidelines require item(s) 1-5 on all ambulatory patients or 1 and 2 on non-ambulatory patients  1   Baseline SPO2 on Room Air at rest 84 %  2   SPO2 during exercise on Room Air n/a %  During exercise monitor SpO2  If SPO2 increases >=89% with ambulation do not add supplemental             oxygen  If <= 88% on room air add O2 via NC and titrate patient  Patient must be ambulated with O2 and titrated to > 88% with exertion  3   SPO2 on Oxygen at rest 93 % 3 lpm     4   SPO2 during exercise on Oxygen  91 % a liter flow of 3 lpm     5   Exercise performed:          walking, duration 8 (min), distance 175 (feet)          [x]  Supplemental Home Oxygen is indicated  []  Client does not qualify for home oxygen  Respiratory Additional Notes- patient had noted desaturation on room air at rest and required 3 lpm nasal cannula to maintain adequate oxygenation during exercise   And will therefore require home oxygen therapy    Tami Hurtado, RT

## 2020-01-16 NOTE — ASSESSMENT & PLAN NOTE
Multifactorial likely secondary to CHF, likely widely metastatic disease and low albumin  Improving  Now on oral Lasix  Negative fluid balance

## 2020-01-16 NOTE — ASSESSMENT & PLAN NOTE
Wt Readings from Last 3 Encounters:   01/12/20 62 8 kg (138 lb 7 2 oz)   01/16/20 62 8 kg (138 lb 7 2 oz)   12/31/19 56 2 kg (124 lb)     Cardiomyopathy with EF 40%  Negative fluid balance  Per Cardiology continue with Lasix,  metoprolol and ACE-inhibitor

## 2020-01-16 NOTE — PLAN OF CARE
Problem: Prexisting or High Potential for Compromised Skin Integrity  Goal: Skin integrity is maintained or improved  Description  INTERVENTIONS:  - Identify patients at risk for skin breakdown  - Assess and monitor skin integrity  - Assess and monitor nutrition and hydration status  - Monitor labs   - Assess for incontinence   - Turn and reposition patient  - Assist with mobility/ambulation  - Relieve pressure over bony prominences  - Avoid friction and shearing  - Provide appropriate hygiene as needed including keeping skin clean and dry  - Evaluate need for skin moisturizer/barrier cream  - Collaborate with interdisciplinary team   - Patient/family teaching  - Consider wound care consult   Outcome: Progressing     Problem: Potential for Falls  Goal: Patient will remain free of falls  Description  INTERVENTIONS:  - Assess patient frequently for physical needs  -  Identify cognitive and physical deficits and behaviors that affect risk of falls    -  [place] fall precautions as indicated by assessment   - Educate patient/family on patient safety including physical limitations  - Instruct patient to call for assistance with activity based on assessment  - Modify environment to reduce risk of injury  - Consider OT/PT consult to assist with strengthening/mobility  Outcome: Progressing     Problem: CARDIOVASCULAR - ADULT  Goal: Maintains optimal cardiac output and hemodynamic stability  Description  INTERVENTIONS:  - Monitor I/O, vital signs and rhythm  - Monitor for S/S and trends of decreased cardiac output  - Administer and titrate ordered vasoactive medications to optimize hemodynamic stability  - Assess quality of pulses, skin color and temperature  - Assess for signs of decreased coronary artery perfusion  - Instruct patient to report change in severity of symptoms  Outcome: Progressing  Goal: Absence of cardiac dysrhythmias or at baseline rhythm  Description  INTERVENTIONS:  - Continuous cardiac monitoring, vital signs, obtain 12 lead EKG if ordered  - Administer antiarrhythmic and heart rate control medications as ordered  - Monitor electrolytes and administer replacement therapy as ordered  Outcome: Progressing     Problem: PAIN - ADULT  Goal: Verbalizes/displays adequate comfort level or baseline comfort level  Description  Interventions:  - Encourage patient to monitor pain and request assistance  - Assess pain using appropriate pain scale  - Administer analgesics based on type and severity of pain and evaluate response  - Implement non-pharmacological measures as appropriate and evaluate response  - Consider cultural and social influences on pain and pain management  - Notify physician/advanced practitioner if interventions unsuccessful or patient reports new pain  Outcome: Progressing     Problem: INFECTION - ADULT  Goal: Absence or prevention of progression during hospitalization  Description  INTERVENTIONS:  - Assess and monitor for signs and symptoms of infection  - Monitor lab/diagnostic results  - Monitor all insertion sites, i e  indwelling lines, tubes, and drains  - Monitor endotracheal if appropriate and nasal secretions for changes in amount and color  - [place] appropriate cooling/warming therapies per order  - Administer medications as ordered  - Instruct and encourage patient and family to use good hand hygiene technique  - Identify and instruct in appropriate isolation precautions for identified infection/condition  Outcome: Progressing     Problem: SAFETY ADULT  Goal: Patient will remain free of falls  Description  INTERVENTIONS:  - Assess patient frequently for physical needs  -  Identify cognitive and physical deficits and behaviors that affect risk of falls    -  [place] fall precautions as indicated by assessment   - Educate patient/family on patient safety including physical limitations  - Instruct patient to call for assistance with activity based on assessment  - Modify environment to reduce risk of injury  - Consider OT/PT consult to assist with strengthening/mobility  Outcome: Progressing  Goal: Maintain or return to baseline ADL function  Description  INTERVENTIONS:  -  Assess patient's ability to carry out ADLs; assess patient's baseline for ADL function and identify physical deficits which impact ability to perform ADLs (bathing, care of mouth/teeth, toileting, grooming, dressing, etc )  - Assess/evaluate cause of self-care deficits   - Assess range of motion  - Assess patient's mobility; develop plan if impaired  - Assess patient's need for assistive devices and provide as appropriate  - Encourage maximum independence but intervene and supervise when necessary  - Involve family in performance of ADLs  - Assess for home care needs following discharge   - Consider OT consult to assist with ADL evaluation and planning for discharge  - Provide patient education as appropriate  Outcome: Progressing  Goal: Maintain or return mobility status to optimal level  Description  INTERVENTIONS:  - Assess patient's baseline mobility status (ambulation, transfers, stairs, etc )    - Identify cognitive and physical deficits and behaviors that affect mobility  - Identify mobility aids required to assist with transfers and/or ambulation (gait belt, sit-to-stand, lift, walker, cane, etc )  - [place] fall precautions as indicated by assessment  - Record patient progress and toleration of activity level on Mobility SBAR; progress patient to next Phase/Stage  - Instruct patient to call for assistance with activity based on assessment  - Consider rehabilitation consult to assist with strengthening/weightbearing, etc   Outcome: Progressing     Problem: DISCHARGE PLANNING  Goal: Discharge to home or other facility with appropriate resources  Description  INTERVENTIONS:  - Identify barriers to discharge w/patient and caregiver  - Arrange for needed discharge resources and transportation as appropriate  - Identify discharge learning needs (meds, wound care, etc )  - Arrange for interpretive services to assist at discharge as needed  - Refer to Case Management Department for coordinating discharge planning if the patient needs post-hospital services based on physician/advanced practitioner order or complex needs related to functional status, cognitive ability, or social support system  Outcome: Progressing     Problem: Knowledge Deficit  Goal: Patient/family/caregiver demonstrates understanding of disease process, treatment plan, medications, and discharge instructions  Description  Complete learning assessment and assess knowledge base    Interventions:  - Provide teaching at level of understanding  - Provide teaching via preferred learning methods  Outcome: Progressing     Problem: RESPIRATORY - ADULT  Goal: Achieves optimal ventilation and oxygenation  Description  INTERVENTIONS:  - Assess for changes in respiratory status  - Assess for changes in mentation and behavior  - Position to facilitate oxygenation and minimize respiratory effort  - Oxygen administered by appropriate delivery if ordered  - Initiate smoking cessation education as indicated  - Encourage broncho-pulmonary hygiene including cough, deep breathe, Incentive Spirometry  - Assess the need for suctioning and aspirate as needed  - Assess and instruct to report SOB or any respiratory difficulty  - Respiratory Therapy support as indicated  Outcome: Progressing

## 2020-01-16 NOTE — ASSESSMENT & PLAN NOTE
Left hilar mass  Pathology report likely small cell lung cancer  Discussed with oncology for further plan  Radiation oncology consulted  Check brain MRI

## 2020-01-16 NOTE — PROGRESS NOTES
Progress Note - Abhilash Lewis 1945, 76 y o  female MRN: [id number]    Unit/Bed#:  Encounter: [id number]    Primary Care Provider: Papi Chua MD   Date and time admitted to hospital: 1/12/2020  4:52 PM        Generalized edema  Assessment & Plan  Multifactorial likely secondary to CHF, likely widely metastatic disease and low albumin  Improving  Now on oral Lasix  Negative fluid balance    Transaminitis  Assessment & Plan  Likely secondary to liver metastases  Patient with numerous liver masses, left lower lobe pulmonary consultation or mass, and Several left anterior subcutaneous soft tissue nodules likely metastases  Chest CT scan with  Left hilar mass resulting in occlusion of the left lower lobe bronchus with associated postobstructive atelectasis  Mediastinal/left hilar adenopathy, left chest wall nodules and innumerable hepatic lesions concerning for metastatic disease    Status post IR liver biopsy on 01/14  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          Small cell carcinoma (HCC)  Assessment & Plan  Left hilar mass  Pathology report likely small cell lung cancer  Discussed with oncology for further plan  Radiation oncology consulted  Check brain MRI    * Atrial fibrillation with rapid ventricular response ([address] )  Assessment & Plan  New onset,  Cardiac echo with EF 40% with diffuse hypokinesis and moderate AS  Currently HR is controlled  Cardiology is following  Continue with Eliquis and diltiazem    Acute systolic CHF (congestive heart failure) ([address] )  Assessment & Plan  Wt Readings from Last 3 Encounters:   01/12/20 62 8 kg (138 lb 7 2 oz)   01/16/20 62 8 kg (138 lb 7 2 oz)   12/31/19 56 2 kg (124 lb)     Cardiomyopathy with EF 40%  Negative fluid balance  Per Cardiology continue with Lasix,  metoprolol and ACE-inhibitor        Numbness of fingers of both hands  Assessment & Plan  Secondary to cervical cord compression with myelopathy   Monitor closely for any compression syndrome  Outpatient neurosurgery follow-up    Chronic obstructive pulmonary disease (HCC)  Assessment & Plan  With active tobacco smoking  Continue bronchodilator  Continue nicotine patch  Evaluate for home oxygen    Benign essential hypertension  Assessment & Plan  Continue to monitor  Currently on metoprolol and lisinopril      VTE Pharmacologic Prophylaxis:   Pharmacologic: Apixaban (Eliquis)  Mechanical VTE Prophylaxis in Place: Yes    Patient Centered Rounds: I have performed bedside rounds with nursing staff today  Discussions with Specialists or Other Care Team Provider:  Oncology, Radiation Oncology    Education and Discussions with Family / Patient:  Patient    Time Spent for Care: 40 minutes  More than 50% of total time spent on counseling and coordination of care as described above  Current Length of Stay: 4 day(s)    Current Patient Status: Inpatient   Certification Statement: The patient will continue to require additional inpatient hospital stay due to Above    Discharge Plan / Estimated Discharge Date: TBD    Code Status: Level 1 - Full Code      Subjective:   Patient seen and examined  Comfortable sitting in chair  Still with dyspnea on exertion  Anxious to go home    Objective:     Vitals:   Temp (24hrs), Av 2 °F (36 8 °C), Min:97 7 °F (36 5 °C), Max:98 7 °F (37 1 °C)    Temp:  [97 7 °F (36 5 °C)-98 7 °F (37 1 °C)] 98 °F (36 7 °C)  HR:  [69-90] 69  Resp:  [19-29] 19  BP: (124-145)/(59-67) 145/67  SpO2:  [94 %-97 %] 94 %  Body mass index is 27 96 kg/m²  Input and Output Summary (last 24 hours):        Intake/Output Summary (Last 24 hours) at 2020 1116  Last data filed at 2020 0753  Gross per 24 hour   Intake 156 ml   Output 900 ml   Net -744 ml       Physical Exam:     Physical Exam  Patient is awake alert in no acute distress  Lung with decreased breath sounds bilateral intermittent wheezing  Heart positive S1-S2 no murmur  Abdomen soft with mild epigastric tenderness  Lower extremities no edema    Additional Data:     Labs:    Results from last 7 days   Lab Units 01/15/20  0542   WBC Thousand/uL 12 80*   HEMOGLOBIN g/dL 11 1*   HEMATOCRIT % 35 1   PLATELETS Thousands/uL 158   NEUTROS PCT % 89*   LYMPHS PCT % 4*   MONOS PCT % 5   EOS PCT % 0     Results from last 7 days   Lab Units 01/15/20  0542   POTASSIUM mmol/L 3 6   CHLORIDE mmol/L 102   CO2 mmol/L 34*   BUN mg/dL 26*   CREATININE mg/dL 0 74   CALCIUM mg/dL 7 6*   ALK PHOS U/L 188*   ALT U/L 164*   AST U/L 260*     Results from last 7 days   Lab Units 01/13/20  1539   INR  1 11       * I Have Reviewed All Lab Data Listed Above  * Additional Pertinent Lab Tests Reviewed:  [address] Admission Reviewed    Imaging:    Imaging Reports Reviewed Today Include:   Imaging Personally Reviewed by Myself Includes:     Recent Cultures (last 7 days):           Last 24 Hours Medication List:     Current Facility-Administered Medications:  acetaminophen 650 mg Oral Q6H PRN Olga Miu, PA-C   albuterol 2 puff Inhalation Q4H PRN Olga Miu, PA-C   apixaban 5 mg Oral BID MALCOLM De La Cruz-C   budesonide-formoterol 2 puff Inhalation BID Olga Miu, PA-C   furosemide 40 mg Oral Daily Rodrigo Pitts PA-C   heparin (porcine) 1,800 Units Intravenous PRN Olga Miu, PA-C   heparin (porcine) 3,600 Units Intravenous PRN Olga Miu, PA-C   ipratropium 0 5 mg Nebulization TID Olga Miu, PA-C   levalbuterol 1 25 mg Nebulization TID Olga Miu, PA-C   lidocaine 1 patch Topical Daily Olga Miu, PA-C   lisinopril 5 mg Oral Daily Olga Miu, PA-C   metoprolol tartrate 25 mg Oral Q12H [facility] Levy Celis PA-C   nicotine 1 patch Transdermal Daily Olga Miu, PA-C   polyethylene glycol 17 g Oral Daily Elav Gutierrez DO   senna-docusate sodium 1 tablet Oral BID Elva Gutierrez DO   traMADol 50 mg Oral Q6H PRN Olga Miu, PA-C        Today, Patient Was Seen By: Elva Gutierrez, DO    ** Please Note: This note has been constructed using a voice recognition system   **

## 2020-01-16 NOTE — CONSULTS
Consultation - Radiation Oncology   Candy Orellana 76 y o  female MRN: [id number]  Unit/Bed#:  Encounter: [id number]        History of Present Illness   Physician Requesting Consult: Adeline Acuña DO  Reason for Consult / Principal Problem:  Newly diagnosed metastatic small cell lung cancer with lung mass  Hx and PE limited by: None  HPI: Candy Orellana is a 76y o  year old female recently admitted to the hospital with generalized progressive edema  Of note, within the past few months she has been evaluated by neuro surgery for cervical spinal stenosis and an MRI of the C-spine had revealed multiple marrow replacing lesions suggestive of a malignant metastatic process  An appointment with medical oncology was pending for 1/28/20  Upon arrival to the hospital, CT of the chest abdomen and pelvis revealed what appeared to be widespread metastatic disease with a large left hilar based mass, mediastinal lymphadenopathy, and innumerable liver metastases with near replacement of all normal liver parenchyma  A biopsy of the liver was performed confirming a diagnosis of small cell carcinoma  We have been consulted to consider palliative radiation therapy to the left hilar mass and mediastinal disease  She is currently satting at 93% on 3 L nasal cannula but desaturate into the low 80s on room air at rest     Inpatient consult to Radiation Oncology  Consult performed by: Junior Deepa MD  Consult ordered by: Adeline Acuña DO          Review of Systems   Constitutional: Positive for activity change and unexpected weight change  HENT: Negative  Eyes: Negative  Respiratory: Positive for shortness of breath  Cardiovascular: Positive for leg swelling  Gastrointestinal: Negative  Genitourinary: Negative  Musculoskeletal: Positive for back pain  Skin: Negative  Neurological: Positive for weakness  Hematological: Negative  Psychiatric/Behavioral: Negative           Historical Information   Previous Oncology History: As per HPI  Past Medical History:   Diagnosis Date    Abnormal mammogram 1/7/2016    Chronic bronchitis ([facility] )     Last Assessed:  3/23/16    Chronic pain of both knees 12/3/2018    Fibromyalgia     History of aneurysm 2004    Hypertension     Recurrent major depressive disorder, in partial remission ([facility] ) 12/20/2016     Past Surgical History:   Procedure Laterality Date    CEREBRAL ANEURYSM REPAIR  2004    COLONOSCOPY      IR IMAGE GUIDED BIOPSY/ASPIRATION LIVER  1/14/2020     Family History   Problem Relation Age of Onset    No Known Problems Sister     No Known Problems Sister     No Known Problems Sister     Heart disease Mother     Heart attack Father      Social History   Social History     Substance and Sexual Activity   Alcohol Use Never    Frequency: Never    Binge frequency: Never     Social History     Substance and Sexual Activity   Drug Use No     Social History     Tobacco Use   Smoking Status Current Every Day Smoker    Packs/day: 1 00    Years: 50 00    Pack years: 50 00    Types: Cigarettes   Smokeless Tobacco Never Used   Tobacco Comment    Motivated to quit       Meds/Allergies   current meds:   Current Facility-Administered Medications   Medication Dose Route Frequency    acetaminophen (TYLENOL) tablet 650 mg  650 mg Oral Q6H PRN    albuterol (PROVENTIL HFA,VENTOLIN HFA) inhaler 2 puff  2 puff Inhalation Q4H PRN    apixaban (ELIQUIS) tablet 5 mg  5 mg Oral BID    budesonide-formoterol (SYMBICORT) 160-4 5 mcg/act inhaler 2 puff  2 puff Inhalation BID    furosemide (LASIX) tablet 40 mg  40 mg Oral Daily    heparin (porcine) injection 1,800 Units  1,800 Units Intravenous PRN    heparin (porcine) injection 3,600 Units  3,600 Units Intravenous PRN    ipratropium (ATROVENT) 0 02 % inhalation solution 0 5 mg  0 5 mg Nebulization TID    levalbuterol (XOPENEX) inhalation solution 1 25 mg  1 25 mg Nebulization TID    lidocaine (LIDODERM) 5 % patch 1 patch  1 patch Topical Daily    lisinopril (ZESTRIL) tablet 5 mg  5 mg Oral Daily    metoprolol tartrate (LOPRESSOR) tablet 25 mg  25 mg Oral Q12H [address]    nicotine (NICODERM CQ) 14 mg/24hr TD 24 hr patch 1 patch  1 patch Transdermal Daily    polyethylene glycol (MIRALAX) packet 17 g  17 g Oral Daily    senna-docusate sodium (SENOKOT S) 8 6-50 mg per tablet 1 tablet  1 tablet Oral BID    traMADol (ULTRAM) tablet 50 mg  50 mg Oral Q6H PRN       No Known Allergies    Objective     Intake/Output Summary (Last 24 hours) at 1/16/2020 1327  Last data filed at 1/16/2020 0753  Gross per 24 hour   Intake 120 ml   Output 500 ml   Net -380 ml     Invasive Devices     Peripheral Intravenous Line            Peripheral IV 01/12/20 Left Antecubital 3 days    Peripheral IV 01/14/20 Dorsal (posterior); Right Forearm 2 days              Physical Exam   The patient is seen today at the bedside  She is sitting upright in a chair with 3 L nasal cannula in no apparent distress  Sclera anicteric  Normal respiratory effort  Normal speech  Normal affect  Responds appropriately to all questions  Lab Results: I have personally reviewed pertinent reports  Imaging Studies: I have personally reviewed pertinent films in PACS  EKG, Pathology, and Other Studies: I have personally reviewed pertinent reports  Assessment/Plan     Assessment and Plan:  Ms Romie Jacobs is a 41-year-old female with recently diagnosed widely metastatic small cell neuroendocrine carcinoma presumably originating in the lung  There is a large left hilar mass with obstruction of the left lower lobe as well as bulky mediastinal lymphadenopathy  She is currently satting well and comfortable on 3 L nasal cannula  She has a rather significant metastatic disease burden with near replacement of the normal liver parenchyma by innumerable metastatic lesions with abnormal liver function tests    She has been evaluated by medical oncology and they are recommending hospice care alone   Given her age, comorbidities, and disease burden, she is not felt to be a good candidate for palliative systemic therapy  Given the degree of liver disease, and the absence of any future systemic therapy, I do not see any benefit to the patient of performing palliative lung radiation  As such, I agree with the recommendation from Medical Oncology for hospice care alone  I have discussed this with Medical Oncology as well as with the patient and her various family members and everyone is in agreement that home hospice would be the best option for the patient  The MRI of the brain that had been ordered can be discontinued  We would recommend hospice consultation as well as palliative care consultation      Code Status: Level 1 - Full Code

## 2020-01-16 NOTE — PROGRESS NOTES
Received message from Dr Guille Quiroz from pathology with result of pt's liver biopsy  Message was forwarded to Dr Dana Rascon from [facility] via [employer] Ling number is [phone]

## 2020-01-17 VITALS
OXYGEN SATURATION: 94 % | HEIGHT: 59 IN | HEART RATE: 129 BPM | DIASTOLIC BLOOD PRESSURE: 60 MMHG | BODY MASS INDEX: 27.91 KG/M2 | SYSTOLIC BLOOD PRESSURE: 123 MMHG | WEIGHT: 138.45 LBS | TEMPERATURE: 97.7 F | RESPIRATION RATE: 20 BRPM

## 2020-01-17 LAB — SCAN RESULT: NORMAL

## 2020-01-17 PROCEDURE — 99239 HOSP IP/OBS DSCHRG MGMT >30: CPT | Performed by: INTERNAL MEDICINE

## 2020-01-17 PROCEDURE — 99232 SBSQ HOSP IP/OBS MODERATE 35: CPT | Performed by: INTERNAL MEDICINE

## 2020-01-17 PROCEDURE — 94640 AIRWAY INHALATION TREATMENT: CPT

## 2020-01-17 PROCEDURE — 94760 N-INVAS EAR/PLS OXIMETRY 1: CPT

## 2020-01-17 PROCEDURE — 99233 SBSQ HOSP IP/OBS HIGH 50: CPT | Performed by: PHYSICIAN ASSISTANT

## 2020-01-17 RX ORDER — LEVALBUTEROL INHALATION SOLUTION 0.63 MG/3ML
SOLUTION RESPIRATORY (INHALATION)
Status: COMPLETED
Start: 2020-01-17 | End: 2020-01-17

## 2020-01-17 RX ORDER — LIDOCAINE 50 MG/G
1 PATCH TOPICAL DAILY
Qty: 30 PATCH | Refills: 0 | Status: SHIPPED | OUTPATIENT
Start: 2020-01-18

## 2020-01-17 RX ORDER — METOPROLOL TARTRATE 5 MG/5ML
5 INJECTION INTRAVENOUS EVERY 6 HOURS PRN
Status: DISCONTINUED | OUTPATIENT
Start: 2020-01-17 | End: 2020-01-17 | Stop reason: HOSPADM

## 2020-01-17 RX ORDER — AMOXICILLIN 250 MG
1 CAPSULE ORAL 2 TIMES DAILY
Qty: 30 TABLET | Refills: 0 | Status: SHIPPED | OUTPATIENT
Start: 2020-01-17

## 2020-01-17 RX ORDER — FUROSEMIDE 40 MG/1
40 TABLET ORAL DAILY
Qty: 30 TABLET | Refills: 0 | Status: SHIPPED | OUTPATIENT
Start: 2020-01-18

## 2020-01-17 RX ORDER — MORPHINE SULFATE 100 MG/5ML
5 SOLUTION ORAL EVERY 6 HOURS PRN
Qty: 30 ML | Refills: 0 | Status: SHIPPED | OUTPATIENT
Start: 2020-01-17 | End: 2020-01-27

## 2020-01-17 RX ORDER — POLYETHYLENE GLYCOL 3350 17 G/17G
17 POWDER, FOR SOLUTION ORAL DAILY
Qty: 14 EACH | Refills: 0 | Status: SHIPPED | OUTPATIENT
Start: 2020-01-18

## 2020-01-17 RX ORDER — NICOTINE 21 MG/24HR
1 PATCH, TRANSDERMAL 24 HOURS TRANSDERMAL DAILY
Qty: 28 PATCH | Refills: 0 | Status: SHIPPED | OUTPATIENT
Start: 2020-01-18

## 2020-01-17 RX ORDER — LORAZEPAM 0.5 MG/1
0.5 TABLET ORAL EVERY 8 HOURS PRN
Qty: 30 TABLET | Refills: 0 | Status: SHIPPED | OUTPATIENT
Start: 2020-01-17 | End: 2020-01-27

## 2020-01-17 RX ORDER — LISINOPRIL 5 MG/1
5 TABLET ORAL DAILY
Qty: 30 TABLET | Refills: 0 | Status: SHIPPED | OUTPATIENT
Start: 2020-01-18

## 2020-01-17 RX ORDER — ACETAMINOPHEN 325 MG/1
650 TABLET ORAL EVERY 6 HOURS PRN
Qty: 30 TABLET | Refills: 0 | Status: SHIPPED | OUTPATIENT
Start: 2020-01-17

## 2020-01-17 RX ADMIN — METOPROLOL TARTRATE 5 MG: 5 INJECTION INTRAVENOUS at 15:59

## 2020-01-17 RX ADMIN — TRAMADOL HYDROCHLORIDE 50 MG: 50 TABLET, FILM COATED ORAL at 18:24

## 2020-01-17 RX ADMIN — TRAMADOL HYDROCHLORIDE 50 MG: 50 TABLET, FILM COATED ORAL at 08:43

## 2020-01-17 RX ADMIN — LIDOCAINE 1 PATCH: 50 PATCH TOPICAL at 08:37

## 2020-01-17 RX ADMIN — METOPROLOL TARTRATE 25 MG: 25 TABLET, FILM COATED ORAL at 08:38

## 2020-01-17 RX ADMIN — POLYETHYLENE GLYCOL 3350 17 G: 17 POWDER, FOR SOLUTION ORAL at 08:39

## 2020-01-17 RX ADMIN — BUDESONIDE AND FORMOTEROL FUMARATE DIHYDRATE 2 PUFF: 160; 4.5 AEROSOL RESPIRATORY (INHALATION) at 18:24

## 2020-01-17 RX ADMIN — IPRATROPIUM BROMIDE 0.5 MG: 0.5 SOLUTION RESPIRATORY (INHALATION) at 13:05

## 2020-01-17 RX ADMIN — LISINOPRIL 5 MG: 5 TABLET ORAL at 08:38

## 2020-01-17 RX ADMIN — APIXABAN 5 MG: 5 TABLET, FILM COATED ORAL at 18:24

## 2020-01-17 RX ADMIN — LEVALBUTEROL HYDROCHLORIDE 1.25 MG: 1.25 SOLUTION, CONCENTRATE RESPIRATORY (INHALATION) at 07:08

## 2020-01-17 RX ADMIN — SENNOSIDES AND DOCUSATE SODIUM 1 TABLET: 8.6; 5 TABLET ORAL at 08:38

## 2020-01-17 RX ADMIN — LEVALBUTEROL HYDROCHLORIDE 1.25 MG: 1.25 SOLUTION, CONCENTRATE RESPIRATORY (INHALATION) at 13:05

## 2020-01-17 RX ADMIN — METOPROLOL TARTRATE 25 MG: 25 TABLET, FILM COATED ORAL at 16:03

## 2020-01-17 RX ADMIN — APIXABAN 5 MG: 5 TABLET, FILM COATED ORAL at 08:38

## 2020-01-17 RX ADMIN — FUROSEMIDE 40 MG: 40 TABLET ORAL at 08:38

## 2020-01-17 RX ADMIN — BUDESONIDE AND FORMOTEROL FUMARATE DIHYDRATE 2 PUFF: 160; 4.5 AEROSOL RESPIRATORY (INHALATION) at 08:45

## 2020-01-17 RX ADMIN — IPRATROPIUM BROMIDE 0.5 MG: 0.5 SOLUTION RESPIRATORY (INHALATION) at 07:08

## 2020-01-17 RX ADMIN — LEVALBUTEROL HYDROCHLORIDE 0.63 MG: 0.63 SOLUTION RESPIRATORY (INHALATION) at 18:20

## 2020-01-17 NOTE — ASSESSMENT & PLAN NOTE
Multifactorial likely secondary to CHF, likely widely metastatic disease and low albumin  Improving  Continue with oral Lasix

## 2020-01-17 NOTE — ASSESSMENT & PLAN NOTE
With active tobacco smoking  Continue bronchodilator  Continue nicotine patch  Plan for home oxygen with hospice

## 2020-01-17 NOTE — ASSESSMENT & PLAN NOTE
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

## 2020-01-17 NOTE — TRANSPORTATION MEDICAL NECESSITY
Section I - General Information    Name of Patient: Bri Dickens                 : 1945    Medicare #: [id number]  Transport Date: 20 (PCS is valid for round trips on this date and for all repetitive trips in the 60-day range as noted below )  Origin: [address] [place] [address] UNIT                                                         Destination: [address] [place] [address] Evan Darius, [facility]  Is the pt's stay covered under Medicare Part A (PPS/DRG)   [x]     Closest appropriate facility? If no, why is transport to more distant facility required? Yes  If hospice pt, is this transport related to pt's terminal illness? Yes       Section II - Medical Necessity Questionnaire  Ambulance transportation is medically necessary only if other means of transport are contraindicated or would be potentially harmful to the patient  To meet this requirement, the patient must either be "bed confined" or suffer from a condition such that transport by means other than ambulance is contraindicated by the patient's condition  The following questions must be answered by the medical professional signing below for this form to be valid:    1)  Describe the MEDICAL CONDITION (physical and/or mental) of this patient AT [address] [place] [address] that requires the patient to be transported in an ambulance and why transport by other means is contraindicated by the patient's condition:SOB-O2 dependent, back pain, metastases to liver, CHF    2) Is the patient "bed confined" as defined below? No  To be "be confined" the patient must satisfy all three of the following conditions: (1) unable to get up from bed without Assistance; AND (2) unable to ambulate; AND (3) unable to sit in a chair or wheelchair  3) Can this patient safely be transported by car or wheelchair van (i e , seated during transport without a medical attendant or monitoring)?    No    4) In addition to completing questions 1-3 above, please check any of the following conditions that apply*:   *Note: supporting documentation for any boxes checked must be maintained in the patient's medical records  If hosp-hosp transfer, describe services needed at 2nd facility not available at 1st facility? Moderate/severe pain on movement   Medical attendant required   Requires oxygen-unable to self administer  Unable to tolerate seated position for time needed to transport       Section III - Signature of Physician or Healthcare Professional  I certify that the above information is true and correct based on my evaluation of this patient, and represent that the patient requires transport by ambulance and that other forms of transport are contraindicated  I understand that this information will be used by the Centers for Medicare and Medicaid Services (CMS) to support the determination of medical necessity for ambulance services, and I represent that I have personal knowledge of the patient's condition at time of transport  []  If this box is checked, I also certify that the patient is physically or mentally incapable of signing the ambulance service's claim and that the institution with which I am affiliated has furnished care, services, or assistance to the patient  My signature below is made on behalf of the patient pursuant to 42 CFR §424 36(b)(4)   In accordance with 42 CFR §424 37, the specific reason(s) that the patient is physically or mentally incapable of signing the claim form is as follows: n/a      Signature of Physician* or Healthcare Professional______________________________________________________________  Signature Date 01/17/20 (For scheduled repetitive transports, this form is not valid for transports performed more than 60 days after this date)    Printed Name & Credentials of Physician or Healthcare Professional (MD, DO, RN, etc )_______Emily Lee RN_________________________  *Form must be signed by patient's attending physician for scheduled, repetitive transports   For non-repetitive, unscheduled ambulance transports, if unable to obtain the signature of the attending physician, any of the following may sign (choose appropriate option below)  [] Physician Assistant []  Clinical Nurse Specialist [x]  Registered Nurse  []  Nurse Practitioner  [x] Discharge Planner

## 2020-01-17 NOTE — PLAN OF CARE
Problem: Prexisting or High Potential for Compromised Skin Integrity  Goal: Skin integrity is maintained or improved  Description  INTERVENTIONS:  - Identify patients at risk for skin breakdown  - Assess and monitor skin integrity  - Assess and monitor nutrition and hydration status  - Monitor labs   - Assess for incontinence   - Turn and reposition patient  - Assist with mobility/ambulation  - Relieve pressure over bony prominences  - Avoid friction and shearing  - Provide appropriate hygiene as needed including keeping skin clean and dry  - Evaluate need for skin moisturizer/barrier cream  - Collaborate with interdisciplinary team   - Patient/family teaching  - Consider wound care consult   Outcome: Progressing     Problem: Potential for Falls  Goal: Patient will remain free of falls  Description  INTERVENTIONS:  - Assess patient frequently for physical needs  -  Identify cognitive and physical deficits and behaviors that affect risk of falls    -  [place] fall precautions as indicated by assessment   - Educate patient/family on patient safety including physical limitations  - Instruct patient to call for assistance with activity based on assessment  - Modify environment to reduce risk of injury  - Consider OT/PT consult to assist with strengthening/mobility  Outcome: Progressing     Problem: CARDIOVASCULAR - ADULT  Goal: Maintains optimal cardiac output and hemodynamic stability  Description  INTERVENTIONS:  - Monitor I/O, vital signs and rhythm  - Monitor for S/S and trends of decreased cardiac output  - Administer and titrate ordered vasoactive medications to optimize hemodynamic stability  - Assess quality of pulses, skin color and temperature  - Assess for signs of decreased coronary artery perfusion  - Instruct patient to report change in severity of symptoms  Outcome: Progressing  Goal: Absence of cardiac dysrhythmias or at baseline rhythm  Description  INTERVENTIONS:  - Continuous cardiac monitoring, vital signs, obtain 12 lead EKG if ordered  - Administer antiarrhythmic and heart rate control medications as ordered  - Monitor electrolytes and administer replacement therapy as ordered  Outcome: Progressing     Problem: PAIN - ADULT  Goal: Verbalizes/displays adequate comfort level or baseline comfort level  Description  Interventions:  - Encourage patient to monitor pain and request assistance  - Assess pain using appropriate pain scale  - Administer analgesics based on type and severity of pain and evaluate response  - Implement non-pharmacological measures as appropriate and evaluate response  - Consider cultural and social influences on pain and pain management  - Notify physician/advanced practitioner if interventions unsuccessful or patient reports new pain  Outcome: Progressing     Problem: INFECTION - ADULT  Goal: Absence or prevention of progression during hospitalization  Description  INTERVENTIONS:  - Assess and monitor for signs and symptoms of infection  - Monitor lab/diagnostic results  - Monitor all insertion sites, i e  indwelling lines, tubes, and drains  - Monitor endotracheal if appropriate and nasal secretions for changes in amount and color  - [place] appropriate cooling/warming therapies per order  - Administer medications as ordered  - Instruct and encourage patient and family to use good hand hygiene technique  - Identify and instruct in appropriate isolation precautions for identified infection/condition  Outcome: Progressing     Problem: SAFETY ADULT  Goal: Patient will remain free of falls  Description  INTERVENTIONS:  - Assess patient frequently for physical needs  -  Identify cognitive and physical deficits and behaviors that affect risk of falls    -  [place] fall precautions as indicated by assessment   - Educate patient/family on patient safety including physical limitations  - Instruct patient to call for assistance with activity based on assessment  - Modify environment to reduce risk of injury  - Consider OT/PT consult to assist with strengthening/mobility  Outcome: Progressing  Goal: Maintain or return to baseline ADL function  Description  INTERVENTIONS:  -  Assess patient's ability to carry out ADLs; assess patient's baseline for ADL function and identify physical deficits which impact ability to perform ADLs (bathing, care of mouth/teeth, toileting, grooming, dressing, etc )  - Assess/evaluate cause of self-care deficits   - Assess range of motion  - Assess patient's mobility; develop plan if impaired  - Assess patient's need for assistive devices and provide as appropriate  - Encourage maximum independence but intervene and supervise when necessary  - Involve family in performance of ADLs  - Assess for home care needs following discharge   - Consider OT consult to assist with ADL evaluation and planning for discharge  - Provide patient education as appropriate  Outcome: Progressing  Goal: Maintain or return mobility status to optimal level  Description  INTERVENTIONS:  - Assess patient's baseline mobility status (ambulation, transfers, stairs, etc )    - Identify cognitive and physical deficits and behaviors that affect mobility  - Identify mobility aids required to assist with transfers and/or ambulation (gait belt, sit-to-stand, lift, walker, cane, etc )  - [place] fall precautions as indicated by assessment  - Record patient progress and toleration of activity level on Mobility SBAR; progress patient to next Phase/Stage  - Instruct patient to call for assistance with activity based on assessment  - Consider rehabilitation consult to assist with strengthening/weightbearing, etc   Outcome: Progressing     Problem: DISCHARGE PLANNING  Goal: Discharge to home or other facility with appropriate resources  Description  INTERVENTIONS:  - Identify barriers to discharge w/patient and caregiver  - Arrange for needed discharge resources and transportation as appropriate  - Identify discharge learning needs (meds, wound care, etc )  - Arrange for interpretive services to assist at discharge as needed  - Refer to Case Management Department for coordinating discharge planning if the patient needs post-hospital services based on physician/advanced practitioner order or complex needs related to functional status, cognitive ability, or social support system  Outcome: Progressing     Problem: Knowledge Deficit  Goal: Patient/family/caregiver demonstrates understanding of disease process, treatment plan, medications, and discharge instructions  Description  Complete learning assessment and assess knowledge base    Interventions:  - Provide teaching at level of understanding  - Provide teaching via preferred learning methods  Outcome: Progressing     Problem: RESPIRATORY - ADULT  Goal: Achieves optimal ventilation and oxygenation  Description  INTERVENTIONS:  - Assess for changes in respiratory status  - Assess for changes in mentation and behavior  - Position to facilitate oxygenation and minimize respiratory effort  - Oxygen administered by appropriate delivery if ordered  - Initiate smoking cessation education as indicated  - Encourage broncho-pulmonary hygiene including cough, deep breathe, Incentive Spirometry  - Assess the need for suctioning and aspirate as needed  - Assess and instruct to report SOB or any respiratory difficulty  - Respiratory Therapy support as indicated  Outcome: Progressing

## 2020-01-17 NOTE — PROGRESS NOTES
Progress Note -  Hematology/Oncology   Lucy De La Vega 76 y o  female MRN: [id number]  Unit/Bed#:  Encounter: [id number]    Attending Hospital Physician: Steffanie Zhou DO  Date of Initial 2020 [facility] Consultation:   Reason for Consultation:  Metastasis to Liver    Please see initial hospital consult note dated 1/13/20 for admission details  HPI: Lucy De La Vega is a 76y o  year old female with a history of HTN, Hx of Cerebral Aneurysm, Fibromyalgia, Chronic B/L Knee pain, Chronic Bronchitis, as well as Depression  New diagnoses of Afib, discovery of Multiple liver lesions, Elevated LFT's and Hepatomegaly were noted in addition to worsening Edema  She presented on 1/12/2020 for significant edema into mid abdominal area  She also had 10 lb unintentional weight loss over the prior month  There was MRI of C-spine that already indicated marrow replacement with Metastatic disease, and was already seen by Neuro/Neurosurgery  Core biopsy was performed on liver mass on 1/14/20 and Small 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  Imaging suggested eminated from Left Lung Mass, and likely was origin  This discovery prompted Dr Geovanni Levy to follow up with family yesterday, and new diagnosis of extensive small cell lung cancer with diffuse liver metastatic disease  There was discussion of aggressiveness of this cancer, and deborah discussion that the entire liver is involved  Assessment/Plan:   #1  New Diagnosis - Extensive Stage Small cell Lung Cancer  Diagnosis confirmed with 1/14/20 Core biopsy of Liver mass, which was resulted yesterday  Patient was seen by both Med Onc (Dr Geovanni Levy) as well as Rad Onc (Dr José Luis Santos)  Dr José Luis Santos did not see benefit of palliative lung radiation  Dr Geovanni Levy presented options including Inpatient Chemo at [facility], explaining potential side effects including fatality, in addition to Hospice care    He indicated our recommendation of Hospice, but advised patient to give each option consideration and that we would follow to discuss their decision  Presenting today, patient's family is around her including , Children and Grandchildren  Patient and family indicated that she wants to go home on [facility]  We discussed that she receives our full support regarding her decision, and that this will be arranged with the goal of comfort care  Discussed with Dr Thu Booth, who already has things underway and discussed with Case management  When family inquired about need for MRI, we discussed that further workup is not necessary, and again her comfort is our goal       Recommendations discussed with the primary team (TY-Dr Thu Booth) on 1/17/2020    We remain available  Please contact us if you have any questions  Review of Systems   Constitutional: Positive for fatigue and unexpected weight change  Respiratory: Positive for shortness of breath  Cardiovascular: Positive for leg swelling  Musculoskeletal: Positive for back pain  Neurological: Positive for extremity weakness (Generalized  )  The remainder of a 12 point review of systems was negative  Objective:  /60 (BP Location: Left arm)   Pulse (!) 129   Temp 97 7 °F (36 5 °C) (Oral)   Resp 20   Ht 4' 11" (1 499 m)   Wt 62 8 kg (138 lb 7 2 oz)   SpO2 94%   BMI 27 96 kg/m²     Physical Exam   Constitutional: No distress  Patient sitting in recliner with legs elevated  Surrounded by her family  Eyes: Right eye exhibits no discharge  Left eye exhibits no discharge  Pulmonary/Chest: No stridor  No respiratory distress  Skin: Skin is warm and dry  She is not diaphoretic  Psychiatric: She has a normal mood and affect   Her behavior is normal    Is appropriate         Recent Labs     01/15/20  0542   WBC 12 80*   HGB 11 1*      *   RDW 13 6   CREATININE 0 74   *   *     Laboratory studies were reviewed      Code Status: Level 3 - DNAR and DNI    Counseling / Coordination of Care  Total floor / unit time spent today  >20 minutes, of which Greater than 50% of total time was spent with the patient and / or family counseling and / or coordination of care

## 2020-01-17 NOTE — SOCIAL WORK
[facility] has accepted pt to in-home hospice  Morphine and Ativan rxs sent to [facility] by Dr Jose Houser and family will  rxs  Out of hospital DNR signed by pt and Dr Thu Booth and placed in pt's chart  Equipment to be delivered to the home at 17:15  Transportation via [employer] up for 18:45  Medical Nec placed in pt's chart  Family and pt are in agreement with in-home hospice and pt is anxious to get home  There is adequate family support in the home

## 2020-01-17 NOTE — UTILIZATION REVIEW
Continued Stay Review    Date:  1/17/2020                       Current Patient Class:   INPT     Current Level of Care:   [facility]     HPI:74 y o  female initially admitted on  1/12  For workup of generalized edema (suspected abd malignancy)   W/transaminitis, leukocytosis, numbness fingers b/l UE  (suspected marrow compression 2/2 mets)      1/12  MRI confirmed spinal cord compression at C3-4, C4-5 levels;  sintes of marrow replacement strongly suspicious for METS  1/12  CTAP with numerous liver masses, consistent with Mets  Also LLL pulmonary consolidation vs mass  Lastly, several Left Anterior Soft tissue nodules in SQ area of Left Abdomen        1/13  ABD US w/Hepatomegaly, replaced with innumerable heterogeneous focal lesion, again concerning for mets  1/13  AFIB w/RVR:  given IV Lopressor which did not help followed by IV Cardizem after which heart rate was little better control  (pt wheezing as well)   Decompensated heart failure: initiated IV Lasix and prn Cardizem  Heparin gtt initiated       1/14  IR -  BIOPSY/aspiration Liver   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     1/15  Neg fluid balance, Switch to po lasix ,  Initiate po Eliquis (dc heparin gtt)  Switch Cardizem with Lopressor 2/2 low EF (40%)   Consider adding ACEi if bp tolerates     1/16    ONCOLOGY Note:  We have been consulted to consider palliative radiation therapy to the left hilar mass and mediastinal disease  She is currently satting at 93% on 3 L nasal cannula but desaturate into the low 80s on room air at rest   made patient aware essentially this is a quality versus quantity condition    As for now I recommended hospice  Will follow patient tomorrow for another goal of care discussion    1/17    Requires supplemental oxygen continuous @  3L NC;  duonebs TID  Ultram po prn -  x2 yesterday and @  4763 this AM      Pertinent Labs/Diagnostic Results:   Results from last 7 days   Lab Units 01/15/20  0542 01/14/20  0430 01/13/20  1539 01/13/20  0645  01/12/20  1713   WBC Thousand/uL 12 80* 14 54* 14 87* 14 10*  --  15 38*   HEMOGLOBIN g/dL 11 1* 11 9 12 2 11 9  --  12 5   HEMATOCRIT % 35 1 36 6 38 1 37 2  --  39 6   PLATELETS Thousands/uL 158 175 202 167   < > 199   NEUTROS ABS Thousands/µL 11 49*  --   --  12 77*  --  13 65*    < > = values in this interval not displayed  Results from last 7 days   Lab Units 01/15/20  0542 01/14/20  0431 01/13/20  0645 01/12/20  1713   SODIUM mmol/L 143 139 142 138   POTASSIUM mmol/L 3 6 3 6 4 1 3 9   CHLORIDE mmol/L 102 99* 103 101   CO2 mmol/L 34* 32 30 30   ANION GAP mmol/L 7 8 9 7   BUN mg/dL 26* 26* 35* 27*   CREATININE mg/dL 0 74 0 84 0 93 0 90   EGFR ml/min/1 73sq m 80 69 61 63   CALCIUM mg/dL 7 6* 7 8* 7 8* 8 2*   MAGNESIUM mg/dL 2 4 2 3  --   --      Results from last 7 days   Lab Units 01/15/20  0542 01/14/20  0431 01/13/20  0645 01/12/20  1713   AST U/L 260* 225* 232* 208*   ALT U/L 164* 181* 190* 184*   ALK PHOS U/L 188* 211* 212* 238*   TOTAL PROTEIN g/dL 5 7* 5 9* 5 9* 6 4   ALBUMIN g/dL 2 2* 2 4* 2 5* 2 7*   TOTAL BILIRUBIN mg/dL 1 00 0 90 1 00 0 80     Results from last 7 days   Lab Units 01/15/20  0712   POC GLUCOSE mg/dl 173*     Results from last 7 days   Lab Units 01/15/20  0542 01/14/20  0431 01/13/20  0645 01/12/20  1713   GLUCOSE RANDOM mg/dL 121 130 113 147*     Results from last 7 days   Lab Units 01/13/20  0020 01/12/20  2138 01/12/20  1713   TROPONIN I ng/mL 0 07* 0 08* 0 05*         Results from last 7 days   Lab Units 01/16/20  0447 01/15/20  1207 01/15/20  0542  01/13/20  1539 01/12/20  2138   PROTIME seconds  --   --   --   --  14 4 13 8   INR   --   --   --   --  1 11 1 06   PTT seconds 25 55* 53*   < > 24  --     < > = values in this interval not displayed       Results from last 7 days   Lab Units 01/13/20  0645   TSH 3RD GENERATON uIU/mL 0 453     Results from last 7 days   Lab Units 01/12/20  1713   NT-PRO BNP pg/mL 4,907* Vital Signs:   01/17/20 0759  97 7 °F   82  20  123/60  87  94 %  3L NC    01/17/20 0308  97 8 °F  73  17  141/60  87  95   3L NC      Medications:   Scheduled Medications:    Medications:  apixaban 5 mg Oral BID   budesonide-formoterol 2 puff Inhalation BID   furosemide 40 mg Oral Daily   ipratropium 0 5 mg Nebulization TID   levalbuterol 1 25 mg Nebulization TID   lidocaine 1 patch Topical Daily   lisinopril 5 mg Oral Daily   metoprolol tartrate 25 mg Oral Q12H CLAUDIO   nicotine 1 patch Transdermal Daily   polyethylene glycol 17 g Oral Daily   senna-docusate sodium 1 tablet Oral BID     Continuous IV Infusions:     PRN Meds:    acetaminophen 650 mg Oral Q6H PRN   albuterol 2 puff Inhalation Q4H PRN   traMADol 50 mg Oral Q6H PRN       Discharge Plan: tbd    Network Utilization Review Department  [email or web address]  ATTENTION: Please call with any questions or concerns to [phone] and carefully listen to the prompts so that you are directed to the right person  All voicemails are confidential   Jabari Sharma all requests for admission clinical reviews, approved or denied determinations and any other requests to dedicated fax number below belonging to the campus where the patient is receiving treatment   List of dedicated fax numbers for the Facilities:  [address] [place] [address] DENIALS (Administrative/Medical Necessity) [phone]   [address] (Maternity/NICU/Pediatrics) [phone] 5400 [facility] [phone]   Jennie Promise [phone]   [facility] [phone]   Patricia Martins [phone]   12[address] [facility] 1525 [facility] [phone]   [facility]  [phone]   [address] [facility] [address] [facility]  2401 [facility] [address] [facility] [phone]

## 2020-01-17 NOTE — ASSESSMENT & PLAN NOTE
New onset,  Cardiac echo with EF 40% with diffuse hypokinesis and moderate AS  Continue with Eliquis and beta-blocker

## 2020-01-17 NOTE — SOCIAL WORK
[id number] [facility] cannot accept due to location (pt lives in [facility])  SHEKHAR contacted  Enrike Bronson and explained that a new referral will be placed to [facility]  SHEKHAR spoke to Hilary and she asks that records be emailed to her at [address] [email or web address]  This was done and she will contact family via phone

## 2020-01-17 NOTE — ASSESSMENT & PLAN NOTE
Left hilar mass  Pathology report likely small cell lung cancer  Oncology and Radiation Oncology recommending hospice  Patient and family agreed  Plan for home with hospice today

## 2020-01-20 ENCOUNTER — TRANSITIONAL CARE MANAGEMENT (OUTPATIENT)
Dept: INTERNAL MEDICINE CLINIC | Facility: CLINIC | Age: 75
End: 2020-01-20

## 2020-01-20 NOTE — UTILIZATION REVIEW
Notification of Discharge  This is a Notification of Discharge from our facility [address]  Please be advised that this patient has been discharge from our facility  Below you will find the admission and discharge date and time including the patients disposition  PRESENTATION DATE: 1/12/2020  4:52 PM  OBS ADMISSION DATE:   IP ADMISSION DATE: 1/12/20 1817   DISCHARGE DATE: 1/17/2020  7:12 PM  DISPOSITION: Home with [address] with Hospice Care   Admission Orders listed below:  Admission Orders (From admission, onward)     Ordered        01/12/20 1817  Inpatient Admission  Once                   Please contact the UR Department if additional information is required to close this patient's authorization/case  [address] [facility] Utilization Review Department  Main: [phone] x carefully listen to the prompts  All voicemails are confidential   [email or web address]  Send all requests for admission clinical reviews, approved or denied determinations and any other requests to dedicated fax number below belonging to the campus where the patient is receiving treatment   List of dedicated fax numbers:  [address] DENIALS (Administrative/Medical Necessity) [phone]   [address] (Maternity/NICU/Pediatrics) [phone]   Brandy Courser [phone]   Christian Sole [phone]   Avinash Grit [phone]   [address] [phone]   [facility]  [phone]   [address] [facility]  [address] [phone]

## 2020-01-22 NOTE — PROGRESS NOTES
Patient was recently in the ER and was diagnosed with small-cell lung cancer metastatic to the liver  I spoke to the  today in the office and explained to him that small-cell lung cancer is very aggressive and a lot of time its presentation is with metastatic lesions  He was very disturbed with the sudden news of the cancer diagnosis  He did acknowledge that every time patient came to me I told her not to smoke  I reassured him that hospice is the right way to go at this point with metastatic disease  He understood and was appreciative that I explained everything to him clearly

## 2020-01-27 ENCOUNTER — TELEPHONE (OUTPATIENT)
Dept: INTERNAL MEDICINE CLINIC | Facility: CLINIC | Age: 75
End: 2020-01-27

## 2020-01-30 ENCOUNTER — TELEPHONE (OUTPATIENT)
Dept: INTERNAL MEDICINE CLINIC | Facility: CLINIC | Age: 75
End: 2020-01-30

## 2020-02-03 ENCOUNTER — TELEPHONE (OUTPATIENT)
Dept: NEUROSURGERY | Facility: CLINIC | Age: 75
End: 2020-02-03

## 2024-05-31 NOTE — PROGRESS NOTES
Patient: Fouzia Hammonds  Patient MRN: [id number]  Service date: 1/16/2020  Attending Physician:       CHIEF COMPLAIN  Chief Complaint   Patient presents with    Shortness of Breath     Pt  reports increased SOB and b/l leg swelling over last two weeks   Back Pain     Pt  states seeing neurologist for pinched nerve in back  Pt  reports increased back pain "that has spread all over "       Heme / Oncology history:  Fouzia Hammonds is a 76 y o  female       3, newly diagnosed extensive small cell lung cancer with diffuse lung metastatic disease  HISTORY OF PRESENT ILLNESS:  Fouzia Hammonds is a 76 y o  female who presents with dyspnea, declining, patient has been seen by oncology service  The biopsy came back positive for small cell lung cancer  Patient is transferred to ICU due to dyspnea and declining     Patient was seen at bedside, no complaint  She feels comfortable  ASSESSMENT/PLAN:  Fouzia Hammonds is a 76 y o  female with:    1) extensive stage small cell lung cancer  2) diffuse liver metastatic disease  3) goal of care discussion      Had a long conversation with patient and family at the bedside  Made patient aware this is a very aggressive  small cell lung cancer, has very diffuse metastatic disease and high tumor burden, the entire liver is full of cancer  In general extensive stage small cell lung cancer is treated by chemotherapy  Biggest challenges is liver failure  Generally,  chemotherapy is metabolised by the liver, however with liver failure, we are not able to predict the side effect and make the dosing challenging  I made patient aware essentially this is a quality versus quantity condition  If patient is willing to proceed with chemotherapy with the risk of unpredictable side effects including fatal side effects, I am willing to transfer patient to [place] and start chemo as inpatient  However again this is not curable, the benefit could be just a few months    And toxicity for chemotherapy is hard to predict, chemo could be fatal for patient in her situation  Will follow patient tomorrow for another goal of care discussion  As for now I recommended hospice  45   minutes were spent face to face with patient with greater than 50% of the time spent in counseling or coordination of care including discussions of treatment instructions  All of the patient's questions were answered to their satisfactory during this discussion  Nathanael Carrillo MD PhD  Hematology / Oncology              PROBLEM LIST:  Patient Active Problem List   Diagnosis    Benign essential hypertension    Chronic obstructive pulmonary disease ([facility] [address] )    Cigarette nicotine dependence without complication    Osteoporosis    Urinary incontinence    Overweight    Numbness of fingers of both hands    Hyperreflexia    Transaminitis    Elevated brain natriuretic peptide (BNP) level    Leukocytosis    Atrial fibrillation with rapid ventricular response (HCC)    Generalized edema    Small cell carcinoma (HCC)    Acute systolic CHF (congestive heart failure) ([facility] [address] )                     PAST MEDICAL HISTORY:   has a past medical history of Abnormal mammogram (1/7/2016), Chronic bronchitis ([facility] [address] ), Chronic pain of both knees (12/3/2018), Fibromyalgia, History of aneurysm (2004), Hypertension, and Recurrent major depressive disorder, in partial remission ([facility] [address] ) (12/20/2016)  PAST SURGICAL HISTORY:   has a past surgical history that includes Colonoscopy; Cerebral aneurysm repair (2004); and IR image guided biopsy/aspiration liver (1/14/2020)      CURRENT MEDICATIONS  Scheduled Meds:  Current Facility-Administered Medications:  acetaminophen 650 mg Oral Q6H PRN Tomasn MALCOLM Fernández-IVCKIE   albuterol 2 puff Inhalation Q4H PRN Tomasn MALCOLM Fernández-C   apixaban 5 mg Oral BID Rodrigo Pitts PA-C   budesonide-formoterol 2 puff Inhalation BID Karn VERA Fernández   furosemide 40 mg Oral Daily Jan Floyd PA-C   heparin (porcine) 1,800 Units Intravenous PRN MALCOLM Pereira-VICKIE   heparin (porcine) 3,600 Units Intravenous PRN Bony Quick, PA-C   ipratropium 0 5 mg Nebulization TID Bony Quick, PA-C   levalbuterol 1 25 mg Nebulization TID Bony Quick, PA-C   lidocaine 1 patch Topical Daily MALCOLM Pereira-C   lisinopril 5 mg Oral Daily MALCOLM Pereira-C   metoprolol tartrate 25 mg Oral Q12H [address] [employer], PA-C   nicotine 1 patch Transdermal Daily MALCOLM Pereira-C   polyethylene glycol 17 g Oral Daily Sneha Chantel, DO   senna-docusate sodium 1 tablet Oral BID Sneha Chantel, DO   traMADol 50 mg Oral Q6H PRN MALCOLM Pereira-C     Continuous Infusions:   PRN Meds:   acetaminophen    albuterol    heparin (porcine)    heparin (porcine)    traMADol    SOCIAL HISTORY:   reports that she has been smoking cigarettes  She has a 50 00 pack-year smoking history  She has never used smokeless tobacco  She reports that she does not drink alcohol or use drugs  FAMILY HISTORY:  family history includes Heart attack in her father; Heart disease in her mother; No Known Problems in her sister, sister, and sister  ALLERGIES:  has No Known Allergies  REVIEW OF SYSTEMS:  Please note that a 14-point review of systems was performed to include Constitutional, HEENT, Respiratory, CVS, GI, , Musculoskeletal, Integumentary, Neurologic, Rheumatologic, Endocrinologic, Psychiatric, Lymphatic, and Hematologic/Oncologic systems were reviewed and are negative unless otherwise stated in HPI  Positive and negative findings pertinent to this evaluation are incorporated into the history of present illness  PHYSICAL EXAMINATION:  Vital Signs: /67   Pulse 69   Temp 98 °F (36 7 °C) (Oral)   Resp 19   Ht 4' 11" (1 499 m)   Wt 62 8 kg (138 lb 7 2 oz)   SpO2 92%   BMI 27 96 kg/m²   Body surface area is 1 58 meters squared   Ht Readings from Last 3 Encounters:   01/12/20 4' 11" (1 499 m)   01/16/20 4' 11" (1 499 m) 12/31/19 4' 10" (1 473 m)     Wt Readings from Last 3 Encounters:   01/12/20 62 8 kg (138 lb 7 2 oz)   01/16/20 62 8 kg (138 lb 7 2 oz)   12/31/19 56 2 kg (124 lb)      Temp Readings from Last 3 Encounters:   01/16/20 98 °F (36 7 °C) (Oral)   12/31/19 (!) 96 8 °F (36 °C) (Tympanic)   10/03/19 98 1 °F (36 7 °C) (Oral)      BP Readings from Last 3 Encounters:   01/16/20 145/67   12/31/19 137/68   12/11/19 124/58       Pulse Readings from Last 3 Encounters:   01/16/20 69   12/31/19 80   12/11/19 70         Constitutional: Alert and oriented; on nasal cannula, decreased breathing sound    HEENT: Anicteric, PERRLA  Chest: Decreased breathing sound bilaterally, No wheezes/rales/rhonchi  CVS: Regular rhythm  Normal rate  Abdomen: Soft, nontender, nondistended  No palpable organomegaly  Extremities: No cyanosis/clubbing/edema  Integumentary: No obvious rashes or bruises  Musculoskeletal: No obvious bony or joint deformities  Psychiatric: Appropriate affect and mood  Lymph Node Survey: No palpable preauricular, submandibular, cervical, supraclavicular, axillary, epitrochlear or inguinal lymphadenopathy      LABS:  Results from last 7 days   Lab Units 01/13/20  0020 01/12/20  2138 01/12/20  1713   TROPONIN I ng/mL 0 07* 0 08* 0 05*     CBC with diff: Results from last 7 days   Lab Units 01/15/20  0542 01/14/20  0430 01/13/20  1539 01/13/20  0645 01/12/20  2138 01/12/20  1713   WBC Thousand/uL 12 80* 14 54* 14 87* 14 10*  --  15 38*   HEMOGLOBIN g/dL 11 1* 11 9 12 2 11 9  --  12 5   HEMATOCRIT % 35 1 36 6 38 1 37 2  --  39 6   MCV fL 100* 101* 100* 102*  --  102*   PLATELETS Thousands/uL 158 175 202 167 187 199   MCH pg 31 7 32 7 32 1 32 5  --  32 1   MCHC g/dL 31 6 32 5 32 0 32 0  --  31 6   RDW % 13 6 13 6 13 6 13 4  --  13 4   MPV fL 11 3 11 1 11 4 11 1 11 2 11 3   NRBC AUTO /100 WBCs 0  --   --  0  --  0       CMP:  Results from last 7 days   Lab Units 01/15/20  0542 01/14/20  0431 01/13/20  0645 01/12/20  1715 POTASSIUM mmol/L 3 6 3 6 4 1 3 9   CHLORIDE mmol/L 102 99* 103 101   CO2 mmol/L 34* 32 30 30   BUN mg/dL 26* 26* 35* 27*   CREATININE mg/dL 0 74 0 84 0 93 0 90   CALCIUM mg/dL 7 6* 7 8* 7 8* 8 2*   AST U/L 260* 225* 232* 208*   ALT U/L 164* 181* 190* 184*   ALK PHOS U/L 188* 211* 212* 238*   EGFR ml/min/1 73sq m 80 69 61 63       Results from last 7 days   Lab Units 01/16/20  0447 01/15/20  1207 01/15/20  0542  01/13/20  1539 01/12/20  2138   INR   --   --   --   --  1 11 1 06   PTT seconds 25 55* 53*   < > 24  --     < > = values in this interval not displayed  Invalid input(s): TNI,  PCT              IMAGING:  IR image guided biopsy/aspiration liver   Final Result   Impression:    Successful ultrasound-guided liver biopsy  Workstation performed: [id number]         CT chest w contrast   Final Result   1  Left hilar mass resulting in occlusion of the left lower lobe bronchus with associated postobstructive atelectasis  Mediastinal/left hilar adenopathy, left chest wall nodules and innumerable hepatic lesions concerning for metastatic disease  2  Small basilar effusions noted  3  COPD and regions of scarring/fibrosis  The study was marked in [facility] for immediate notification  Workstation performed: [id number]         US abdomen complete   Final Result      1  Enlarged liver with the parenchyma essentially replaced with innumerable heterogeneous focal lesions, as demonstrated on the recent CT scan, concerning for metastatic disease  2   Cholelithiasis without evidence of acute cholecystitis  3   Nonobstructing 3 mm calculus in the midportion of the right kidney  Workstation performed: [id number]         CT abdomen pelvis wo contrast   Final Result      Numerous liver masses replacing a significant portion of the parenchyma consistent with metastases  Left lower lobe pulmonary consolidation or mass, partially imaged  Associated small pleural effusion        Several left anterior subcutaneous soft tissue nodules, the largest measuring 1 9 x 1 5 cm, suspicious for metastasis  I personally discussed this study with Dr Nanci Hatfield on 1/12/2020 at 9:30 PM                                  Workstation performed: [id number]         XR chest 2 views   ED Interpretation   Left pleural effusion      Final Result      Small left pleural effusion with associated lower lung zone opacity  This report is concordant with Savi Peterson preliminary interpretation that is documented in the electronic medical record (EPIC)        Workstation performed: [id number] Linear

## 2025-01-27 NOTE — PROGRESS NOTES
General Cardiology   Progress Note   Leyla Parrish 76 y o  female MRN: [id number]  Unit/Bed#:  Encounter: [id number]    Assessment/Plan:    1  New onset atrial fibrillation  -continue Eliquis 5mg BID   -continue metoprolol tartrate 25 mg b i d      2  Acute systolic heart failure   -euvolemic on physical exam  -continue PO lasix 40mg daily  -cardiac diet    -daily weights at and strict I&Os     3  Small cell lung cancer with diffuse liver and lung lung metastatic disease  -oncology consulted and discussed goal of care with patient and family, patient decided to go home with hospice     4   Elevated troponin  -troponin with peak at 0 08 and trending down  -unclear significance  -continue to monitor     6- HTN, currently controlled   -continue lisinopril metoprolol tartrate  -continue to monitor blood pressures     7  Cardiomyopathy, unspecified, with EF 40%  -continue metoprolol tartrate 25 mg b i d  And lisinopril 5 mg daily  -no role for invasive ischemic workup at this time given patient going home on hospice    At this point Cardiology will be signing off, please feel free to contact us for any further issues    Subjective:   Patient seen and examined  No significant events since the last encounter       REVIEW OF SYSTEMS:  Constitutional:  Denies fever or chills   Eyes:  Denies change in visual acuity   HENT:  Denies nasal congestion or sore throat   Respiratory:  Denies cough or shortness of breath   Cardiovascular:  Denies chest pain or edema   GI:  Denies abdominal pain, nausea, vomiting, bloody stools, constipation or diarrhea   :  Denies dysuria, frequency, difficulty in urination or nocturia  Musculoskeletal:  Denies back pain or joint pain   Neurologic:  Denies headache, focal weakness or sensory changes   Endocrine:  Denies polyuria or polydipsia   Lymphatic:  Denies swollen glands   Psychiatric:  Denies depression or anxiety     Objective:   Vitals:  Vitals:    01/17/20 1305   BP:    Pulse: Your potassium level is mildly elevated. You have been prescribed a lokelma packet to take this evening and you should resume outpatient dialysis tomorrow to help with your potassium levels.      Learning About Fluid Overload  What is fluid overload?     Fluid overload means that your body has too much water. The extra fluid in your body can raise your blood pressure and force your heart to work harder. It can also make it hard for you to breathe.  Most of your body is made up of water. The body uses minerals like sodium and potassium to help organs such as your heart, kidneys, and liver balance how much water you need. For example, the heart pumps blood to move water around the body. And the kidneys work to get rid of the water that the body doesn't need.  Health conditions like kidney disease, heart failure, and cirrhosis can cause fluid overload.  Other things can cause extra fluid to build up. I.V. fluids, some medicines, too much salt (sodium) from food, and certain medical treatments can sometimes cause this fluid increase.  What are the symptoms?  Some of the most common symptoms are:  Gaining weight over a short period of time.  Swelling in the ankles or legs.  Shortness of breath.  How is it treated?  The goal of treatment is to remove the extra fluid in your body. Your treatment will depend on the cause. Your doctor may:  Give you medicines, such as diuretics (also called \"water pills\"). They help your body get rid of the extra fluid.  Recommend that you limit sodium.  Follow-up care is a key part of your treatment and safety. Be sure to make and go to all appointments, and call your doctor if you are having problems. It's also a good idea to know your test results and keep a list of the medicines you take.  Current as of: July 31, 2024  Content Version: 14.3  © 2024 [employer].   Care instructions adapted under license by [employer]. If you have questions about a medical condition or this  Resp:    Temp:    SpO2: 95%       Body mass index is 27 96 kg/m²  Systolic (00RNC), PIT:556 , Min:113 , DCR:980     Diastolic (21LCQ), GUILLAUME:95, Min:57, Max:60      Intake/Output Summary (Last 24 hours) at 1/17/2020 1551  Last data filed at 1/17/2020 0616  Gross per 24 hour   Intake    Output 250 ml   Net -250 ml     Weight (last 2 days)     None          Telemetry Review:  Patient not on telemetry        PHYSICAL EXAMS  General:  Patient is not in acute distress, laying in the bed comfortably, awake, alert responding to commands  Head: Normocephalic, Atraumatic  HEENT: White sclera, pink conjunctiva  Neck:  Supple, no neck vein distention  Respiratory: clear to P/A  Cardiovascular: S1-S2 normal, no murmurs, thrills, gallops, rubs, regular rhythm  GI:  Abdomen soft, nontender, non-distended  Extremities:  +3 bilateral lower extremity pitting edema, normal pulses  Integument:  No skin rashes or ulceration  Neurologic:  Patient is awake alert, responding to command, oriented to person, place and time    Nd time   LABORATORY RESULTS:  Results from last 7 days   Lab Units 01/13/20  0020 01/12/20  2138 01/12/20  1713   TROPONIN I ng/mL 0 07* 0 08* 0 05*     CBC with diff:   Results from last 7 days   Lab Units 01/15/20  0542 01/14/20  0430 01/13/20  1539 01/13/20  0645  01/12/20  1713   WBC Thousand/uL 12 80* 14 54* 14 87* 14 10*  --  15 38*   HEMOGLOBIN g/dL 11 1* 11 9 12 2 11 9  --  12 5   HEMATOCRIT % 35 1 36 6 38 1 37 2  --  39 6   MCV fL 100* 101* 100* 102*  --  102*   PLATELETS Thousands/uL 158 175 202 167   < > 199   MCH pg 31 7 32 7 32 1 32 5  --  32 1   MCHC g/dL 31 6 32 5 32 0 32 0  --  31 6   RDW % 13 6 13 6 13 6 13 4  --  13 4   MPV fL 11 3 11 1 11 4 11 1   < > 11 3   NRBC AUTO /100 WBCs 0  --   --  0  --  0    < > = values in this interval not displayed         CMP:  Results from last 7 days   Lab Units 01/15/20  0542 01/14/20  0431 01/13/20  0645   POTASSIUM mmol/L 3 6 3 6 4 1   CHLORIDE mmol/L 102 99* 103   CO2 mmol/L 34* 32 30   BUN mg/dL 26* 26* 35*   CREATININE mg/dL 0 74 0 84 0 93   CALCIUM mg/dL 7 6* 7 8* 7 8*   AST U/L 260* 225* 232*   ALT U/L 164* 181* 190*   ALK PHOS U/L 188* 211* 212*   EGFR ml/min/1 73sq m 80 69 61       BMP:  Results from last 7 days   Lab Units 01/15/20  0542 20  0431 20  0645   POTASSIUM mmol/L 3 6 3 6 4 1   CHLORIDE mmol/L 102 99* 103   CO2 mmol/L 34* 32 30   BUN mg/dL 26* 26* 35*   CREATININE mg/dL 0 74 0 84 0 93   CALCIUM mg/dL 7 6* 7 8* 7 8*         Results from last 7 days   Lab Units 20  1713   NT-PRO BNP pg/mL 4,907*      Results from last 7 days   Lab Units 01/15/20  0542 20  0431   MAGNESIUM mg/dL 2 4 2 3         Results from last 7 days   Lab Units 20  0645   TSH 3RD GENERATON uIU/mL 0 453     Results from last 7 days   Lab Units 20  1539 20  2138   INR  1 11 1 06       Lipid Profile:   Lab Results   Component Value Date    CHOL 157 2015    CHOL 151 2014     Lab Results   Component Value Date    HDL 43 2019    HDL 39 (L) 2019    HDL 39 (L) 2018     Lab Results   Component Value Date    LDLCALC 102 (H) 2019    LDLCALC 72 2019    LDLCALC 90 2018     Lab Results   Component Value Date    TRIG 66 2019    TRIG 78 2019    TRIG 109 2018       Cardiac testing:   Results for orders placed during the hospital encounter of 20   Echo complete with contrast if indicated    Narrative [address] [place] [id number] [phone]    Transthoracic Echocardiogram  2D, M-mode, Doppler, and Color Doppler    Study date:  2020    Patient: Krish Canchola  MR number: [id number]  Account number: [de-identified]  : 1945  Age: 76 years  Gender: Female  Status: Inpatient  Location: Bedside  Height: 59 in  Weight: 138 lb  BP: 125/ 61 mmHg    Indications: Heart failure, Assess left ventricular function      Diagnoses: I50 9 - Heart failure, unspecified    Sonographer:  Chen Gordillo RDCS  Referring Physician:  Harleen Hurtado MD  Group:  [facility]  Interpreting Physician:  Ayo Lentz MD    SUMMARY    LEFT VENTRICLE:  The ventricle was mildly dilated  Ejection fraction was estimated to be 40 %  There was mild diffuse hypokinesis  MITRAL VALVE:  There was mild regurgitation  AORTIC VALVE:  The valve was probably trileaflet  Leaflets exhibited moderate calcification  There was at least moderate stenosis  Peak gradient 35 mm Hg and mean 21 mm Hg  There was mild to moderate regurgitation  TRICUSPID VALVE:  There was mild regurgitation  HISTORY: PRIOR HISTORY: Risk factors: hypertension and a history of current cigarette use (within the last month)  Chronic lung disease  PROCEDURE: The procedure was performed at the bedside  This was a routine study  The transthoracic approach was used  The study included complete 2D imaging, M-mode, complete spectral Doppler, and color Doppler  The heart rate was 61 bpm,  at the start of the study  Images were obtained from the parasternal, apical, subcostal, and suprasternal notch acoustic windows  Image quality was adequate  LEFT VENTRICLE: The ventricle was mildly dilated  Ejection fraction was estimated to be 40 %  There was mild diffuse hypokinesis  DOPPLER: There was an increased relative contribution of atrial contraction to ventricular filling  RIGHT VENTRICLE: The size was normal  Systolic function was normal  Wall thickness was normal     LEFT ATRIUM: Size was normal     RIGHT ATRIUM: Size was normal     MITRAL VALVE: There was annular calcification  DOPPLER: There was mild regurgitation  AORTIC VALVE: The valve was probably trileaflet  Leaflets exhibited moderate calcification  The valve was not well visualized  DOPPLER: There was at least moderate stenosis  Peak gradient 35 mm Hg and mean 21 mm Hg   There was mild to  moderate regurgitation  TRICUSPID VALVE: The valve structure was normal  There was normal leaflet separation  DOPPLER: The transtricuspid velocity was within the normal range  There was no evidence for stenosis  There was mild regurgitation  PULMONIC VALVE: Leaflets exhibited normal thickness, no calcification, and normal cuspal separation  DOPPLER: The transpulmonic velocity was within the normal range  There was no regurgitation  PERICARDIUM: There was no pericardial effusion  The pericardium was normal in appearance  AORTA: The root exhibited normal size  SYSTEM MEASUREMENT TABLES    2D  %FS: 28 6 %  Ao Diam: 2 7 cm  EDV(Teich): 141 3 ml  EF Biplane: 41 %  EF(Teich): 54 7 %  ESV(Teich): 64 1 ml  IVSd: 0 9 cm  LA Area: 21 6 cm2  LA Diam: 3 5 cm  LVEDV MOD A2C: 155 7 ml  LVEDV MOD A4C: 126 8 ml  LVEDV MOD BP: 140 5 ml  LVEF MOD A2C: 39 3 %  LVEF MOD A4C: 43 7 %  LVESV MOD A2C: 94 4 ml  LVESV MOD A4C: 71 4 ml  LVESV MOD BP: 82 9 ml  LVIDd: 5 4 cm  LVIDs: 3 9 cm  LVLd A2C: 8 4 cm  LVLd A4C: 8 4 cm  LVLs A2C: 7 cm  LVLs A4C: 6 8 cm  LVOT Diam: 2 2 cm  LVPWd: 0 9 cm  RA Area: 15 7 cm2  RVIDd: 3 6 cm  SV MOD A2C: 61 2 ml  SV MOD A4C: 55 4 ml  SV(Teich): 77 2 ml    CW  AR Dec Frederick: 2 9 m/s2  AR Dec Time: 1571 6 ms  AR PHT: 455 7 ms  AR Vmax: 4 4 m/s  AR maxP 2 mmHg  AV Env  Ti: 323 5 ms  AV VTI: 70 2 cm  AV Vmax: 3 m/s  AV Vmean: 2 2 m/s  AV maxP 4 mmHg  AV meanP 8 mmHg    MM  TAPSE: 2 5 cm    PW  MILDRED (VTI): 1 2 cm2  MILDRED Vmax: 1 cm2  E': 0 1 m/s  E/E': 19 2  LVOT Env  Ti: 376 8 ms  LVOT VTI: 22 5 cm  LVOT Vmax: 0 8 m/s  LVOT Vmean: 0 6 m/s  LVOT maxP 5 mmHg  LVOT meanP 6 mmHg  LVSV Dopp: 87 4 ml  MV A Te: 1 2 m/s  MV Dec Frederick: 4 3 m/s2  MV DecT: 244 2 ms  MV E Te: 1 1 m/s  MV E/A Ratio: 0 9  MV PHT: 70 8 ms  MVA By PHT: 3 1 cm2    [facility]    Prepared and electronically signed by    Clarita Davis MD  Signed 2020 10:48:52 No results found for this or any previous visit  No results found for this or any previous visit  No procedure found  No results found for this or any previous visit  Meds/Allergies   all current active meds have been reviewed  Medications Prior to Admission   Medication    albuterol (PROVENTIL HFA) 90 mcg/act inhaler    alendronate (FOSAMAX) 70 mg tablet    amLODIPine (NORVASC) 5 mg tablet    benazepril (LOTENSIN) 20 mg tablet    budesonide-formoterol (SYMBICORT) 160-4 5 mcg/act inhaler            Counseling / Coordination of Care  Total floor / unit time spent today 15 minutes  Greater than 50% of total time was spent with the patient and / or family counseling and / or coordination of care  ** Please Note: Dragon 360 Dictation voice to text software may have been used in the creation of this document   **